# Patient Record
Sex: FEMALE | Race: WHITE | NOT HISPANIC OR LATINO | Employment: OTHER | URBAN - METROPOLITAN AREA
[De-identification: names, ages, dates, MRNs, and addresses within clinical notes are randomized per-mention and may not be internally consistent; named-entity substitution may affect disease eponyms.]

---

## 2017-01-16 ENCOUNTER — TELEPHONE (OUTPATIENT)
Dept: MEDICAL GROUP | Facility: MEDICAL CENTER | Age: 47
End: 2017-01-16

## 2017-01-16 DIAGNOSIS — E03.8 OTHER SPECIFIED HYPOTHYROIDISM: ICD-10-CM

## 2017-01-17 RX ORDER — CYCLOBENZAPRINE HCL 10 MG
TABLET ORAL
Qty: 60 TAB | Refills: 0 | Status: SHIPPED
Start: 2017-01-17 | End: 2017-02-21 | Stop reason: SDUPTHER

## 2017-01-17 RX ORDER — LEVOTHYROXINE SODIUM 175 UG/1
TABLET ORAL
Qty: 90 TAB | Refills: 1 | Status: SHIPPED | OUTPATIENT
Start: 2017-01-17 | End: 2017-09-13 | Stop reason: SDUPTHER

## 2017-01-17 RX ORDER — ALPRAZOLAM 1 MG/1
TABLET ORAL
Qty: 90 TAB | Refills: 0 | OUTPATIENT
Start: 2017-01-17

## 2017-01-17 NOTE — TELEPHONE ENCOUNTER
Was the patient seen in the last year in this department? Yes     Does patient have an active prescription for medications requested? No     Received Request Via: Patient     [place]  for [place]anax [place] 12/28/16

## 2017-01-23 ENCOUNTER — OFFICE VISIT (OUTPATIENT)
Dept: MEDICAL GROUP | Facility: MEDICAL CENTER | Age: 47
End: 2017-01-23
Payer: COMMERCIAL

## 2017-01-23 VITALS
WEIGHT: 280 LBS | BODY MASS INDEX: 45 KG/M2 | TEMPERATURE: 97.9 F | DIASTOLIC BLOOD PRESSURE: 84 MMHG | RESPIRATION RATE: 12 BRPM | SYSTOLIC BLOOD PRESSURE: 104 MMHG | OXYGEN SATURATION: 95 % | HEART RATE: 82 BPM | HEIGHT: 66 IN

## 2017-01-23 DIAGNOSIS — F41.9 ANXIETY: ICD-10-CM

## 2017-01-23 DIAGNOSIS — L98.9 BENIGN SKIN LESION: ICD-10-CM

## 2017-01-23 DIAGNOSIS — M77.11 RIGHT LATERAL EPICONDYLITIS: ICD-10-CM

## 2017-01-23 PROCEDURE — 20605 DRAIN/INJ JOINT/BURSA W/O US: CPT | Performed by: NURSE PRACTITIONER

## 2017-01-23 PROCEDURE — 99213 OFFICE O/P EST LOW 20 MIN: CPT | Mod: 25 | Performed by: NURSE PRACTITIONER

## 2017-01-23 RX ORDER — ALPRAZOLAM 1 MG/1
1 TABLET ORAL 3 TIMES DAILY PRN
Qty: 90 TAB | Refills: 0 | Status: SHIPPED
Start: 2017-01-23 | End: 2017-02-27 | Stop reason: SDUPTHER

## 2017-01-23 NOTE — MR AVS SNAPSHOT
"        Bella Fagan Phoenix   2017 3:40 PM   Office Visit   MRN: [id number]    Department:  [address]   Dept Phone:  [phone]    Description:  Female : 1970   Provider:  ERIKA Cuevas           Reason for Visit     Other patient has suspicious lesion under left breast area, brown in color, onset 6 months       Allergies as of 2017     Allergen Noted Reactions    Nkda [No Known Drug Allergy] 09/15/2010         Vital Signs     Blood Pressure Pulse Temperature Respirations Height Weight    104/84 mmHg 82 36.6 °C (97.9 °F) 12 1.676 m (5' 5.98\") 127.007 kg (280 lb)    Body Mass Index Oxygen Saturation Smoking Status             45.21 kg/m2 95% Former Smoker         Basic Information     Date Of Birth Sex Race Ethnicity Preferred Language    1970 Female White Non- English      Problem List              ICD-10-CM Priority Class Noted - Resolved    Obesity E66.9   2009 - Present    Chronic back pain M54.9, G89.29   2011 - Present    Anxiety F41.9   2011 - Present    Insomnia G47.00   3/20/2014 - Present    Other specified hypothyroidism E03.8   2015 - Present    Right knee pain M25.561   2016 - Present      Health Maintenance        Date Due Completion Dates    IMM DTaP/Tdap/Td Vaccine (1 - Tdap) 1988 ---    IMM INFLUENZA (1) 2015    PAP SMEAR 10/24/2016 10/24/2013, 9/15/2010    MAMMOGRAM 2017, 2015, 3/31/2014, 3/29/2013, 2010            Current Immunizations     Influenza Vaccine Quad Inj (Pf) 2015 11:04 AM      Below and/or attached are the medications your provider expects you to take. Review all of your home medications and newly ordered medications with your provider and/or pharmacist. Follow medication instructions as directed by your provider and/or pharmacist. Please keep your medication list with you and share with your provider. Update the information when medications are discontinued, " doses are changed, or new medications (including over-the-counter products) are added; and carry medication information at all times in the event of emergency situations     Allergies:  NKDA - (reactions not documented)               Medications  Valid as of: January 23, 2017 -  4:33 PM    Generic Name Brand Name Tablet Size Instructions for use    ALPRAZolam (Tab) XANAX 1 MG TAKE ONE-HALF TO ONE TABLET BY MOUTH THREE TIMES DAILY AS NEEDED FOR ANXIETY        Amitriptyline HCl (Tab) ELAVIL 150 MG Take 1 Tab by mouth at bedtime as needed for Sleep.        Amitriptyline HCl (Tab) ELAVIL 50 MG Take 1-2 Tabs by mouth at bedtime as needed for Sleep.        Amoxicillin-Pot Clavulanate (Tab) AUGMENTIN 875-125 MG Take 1 Tab by mouth 2 times a day.        Cyclobenzaprine HCl (Tab) FLEXERIL 10 MG TAKE ONE TABLET BY MOUTH TWICE DAILY AS NEEDED        Garcinia Cambogia-Chromium (Tab) Garcinia Cambogia-Chromium 500-200 MG-MCG Take  by mouth.        Hydrocod Polst-Chlorphen Polst (Suspension Extended Release) TUSSIONEX 10-8 MG/5ML Take 5 mL by mouth every 12 hours.        Hydrocodone-Acetaminophen (Tab) NORCO 5-325 MG Take 1 Tab by mouth every 6 hours as needed.        Ibuprofen (Tab) MOTRIN 800 MG Take 1 Tab by mouth every 8 hours as needed for Mild Pain.        Levothyroxine Sodium (Tab) SYNTHROID 175 MCG TAKE ONE TABLET BY MOUTH ONCE DAILY        Nabumetone (Tab) RELAFEN 500 MG Take 1 Tab by mouth 2 times a day. With food in place of ibuprofen for knee inflammation        Somatropin (Recon Soln) Somatropin 0.2 MG Inject 4 mg as instructed.        Somatropin   Inject 0.08 Each as instructed every day.        .                 Medicines prescribed today were sent to:     [facility] - [place] (), [place] - [address]    [address] [place] [address] [place] [address]    Phone: [phone] Fax: [phone]    Open 24 Hours?: No    [facility]  [place], [place] - [address] [address]  [place] [address]    Phone: [phone] Fax: [phone]    Open 24 Hours?: Yes      Medication refill instructions:       If your prescription bottle indicates you have medication refills left, it is not necessary to call your provider’s office. Please contact your pharmacy and they will refill your medication.    If your prescription bottle indicates you do not have any refills left, you may request refills at any time through one of the following ways: The online Quri system (except Urgent Care), by calling your provider’s office, or by asking your pharmacy to contact your provider’s office with a refill request. Medication refills are processed only during regular business hours and may not be available until the next business day. Your provider may request additional information or to have a follow-up visit with you prior to refilling your medication.   *Please Note: Medication refills are assigned a new Rx number when refilled electronically. Your pharmacy may indicate that no refills were authorized even though a new prescription for the same medication is available at the pharmacy. Please request the medicine by name with the pharmacy before contacting your provider for a refill.           Quri Access Code: Activation code not generated  Current Quri Status: Active

## 2017-01-24 NOTE — PROGRESS NOTES
"Chief Complaint   Patient presents with   • Other     patient has suspicious lesion under left breast area, brown in color, onset 6 months        HPI  Tammie is a 45 yo est female here with a few concerns:  #1-reports that she has a lesion under left breast has been present for approximately 6 months.  No pain, itching or discharge.  Not changing in size.  She is concerned b/c of grandmother's hx of CA -uncertain type.  Maternal aunt - had breast CA.  Mother passed young from overdose.    #2-anxiety: Chronic issue. Takes Xanax 3 times daily and has been dependent on Xanax for multiple years. Reports the Xanax works well for her and other medications that she has tried for anxiety of been unsuccessful. Requesting a refill of her Xanax.  #3-right elbow lateral epicondylitis:  Initial symptom 8/2015 and her right elbow was injected by Dr. Velez. I injected her elbow approximately 10 months ago and she states that injections usually help her for several months but the pain returns. 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 She reports that her right elbow aches constantly, more so with use. She has been wearing a tennis elbow brace since her pain started which she states helps intermittently. She denies numbness or tingling to her arm.    Right knee pain - improved.  Seeing Dr. Gonzales for right knee - injection helped tremendously and pain is gone.  MRI of knee was negative.        Past medical, surgical, family, and social history is reviewed and updated in Epic chart by me today.   Medications and allergies reviewed and updated in Epic chart by me today.     ROS:   As documented in history of present illness above    Exam:  Blood pressure 104/84, pulse 82, temperature 36.6 °C (97.9 °F), resp. rate 12, height 1.676 m (5' 5.98\"), weight 127.007 kg (280 lb), SpO2 95 %.  Constitutional: Alert, no distress, plus 3 vital signs  Skin:  Warm, dry, no rashes invisible areas. She " has a 4 mm x 2 mm superficial hypopigmented raised nevi just below her left breast without any sign of atypia such as dark color, irregular border or size greater than 8 mm.  Eye: Equal, round and reactive, conjunctiva clear  ENMT: Lips without lesions, good dentition, oropharynx clear    Neck: Trachea midline  Respiratory: Unlabored respiration, lungs clear to auscultation, no wheezes, no rhonchi  Cardiovascular: Normal rate and rhythm, no murmur, no edema  Musculoskeletal: She is tender to her right lateral epicondyle area with overlying swelling but no erythema or masses felt.  Psych: Alert, pleasant, well-groomed, normal affect    PROCEDURE NOTE: Patient consented after risks and benefits discussed of injection and corticosteroid. Area cleansed with iodine. Ethyl chloride sprayed in area. Right lateral elbow- region of lateral epicondyle/soft tissue area injected with 1 ml kenalog 40 mg/ml + 1 ml lidocaine 1%. Patient tolerated procedure well. No complications. After care instructions given    A/P:  1. Anxiety  -Patient is early for her Xanax, last fill was December 28, she may fill this Friday, January 27. Reviewed dependency properties of alprazolam with her and she verbalized understanding. Discussed that she may not drive a car within 4-6 hours of taking Xanax.  - alprazolam (XANAX) 1 MG Tab; Take 1 Tab by mouth 3 times a day as needed for Anxiety.  Dispense: 90 Tab; Refill: 0    2. Benign skin lesion  -Discussed benign nature.    3. Right lateral epicondylitis  -see procedure noted above. Offered referral to physical therapy which she declined. She'll continue wearing her right elbow brace. Discussed stretches, ice, heat, topical anti-inflammatories, appropriate amounts of oral anti-inflammatories and I encouraged her to make an appointment with her orthopedist regarding this if this injection is not helpful or if her pain returns sooner than 4-6 weeks.

## 2017-02-21 RX ORDER — CYCLOBENZAPRINE HCL 10 MG
TABLET ORAL
Qty: 60 TAB | Refills: 0 | Status: SHIPPED | OUTPATIENT
Start: 2017-02-21 | End: 2017-11-16 | Stop reason: SDUPTHER

## 2017-02-22 ENCOUNTER — TELEPHONE (OUTPATIENT)
Dept: MEDICAL GROUP | Facility: MEDICAL CENTER | Age: 47
End: 2017-02-22

## 2017-02-22 DIAGNOSIS — F41.9 ANXIETY: ICD-10-CM

## 2017-02-22 NOTE — TELEPHONE ENCOUNTER
Referral is in to therapist.  Is there an appt early next week in our new office with me? Reassure her that she can't make an incorrect choice and to follow her instinct.
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
## 2017-02-23 ENCOUNTER — PATIENT MESSAGE (OUTPATIENT)
Dept: FAMILY PLANNING/WOMEN'S HEALTH CLINIC | Facility: OTHER | Age: 47
End: 2017-02-23

## 2017-02-27 RX ORDER — NITROFURANTOIN 25; 75 MG/1; MG/1
CAPSULE ORAL
Qty: 30 CAP | Refills: 0 | Status: SHIPPED | OUTPATIENT
Start: 2017-02-27 | End: 2018-01-24

## 2017-02-27 RX ORDER — AMITRIPTYLINE HYDROCHLORIDE 50 MG/1
TABLET, FILM COATED ORAL
Qty: 90 TAB | Refills: 2 | Status: SHIPPED | OUTPATIENT
Start: 2017-02-27 | End: 2017-08-30 | Stop reason: SDUPTHER

## 2017-02-27 RX ORDER — ALPRAZOLAM 1 MG/1
.5-1 TABLET ORAL 3 TIMES DAILY PRN
Qty: 90 TAB | Refills: 0 | Status: SHIPPED
Start: 2017-02-27 | End: 2017-03-27 | Stop reason: SDUPTHER

## 2017-02-27 NOTE — TELEPHONE ENCOUNTER
Was the patient seen in the last year in this department? Yes  refill 1/27/17    Does patient have an active prescription for medications requested? No     Received Request Via: Pharmacy

## 2017-03-01 ENCOUNTER — TELEPHONE (OUTPATIENT)
Dept: MEDICAL GROUP | Facility: LAB | Age: 47
End: 2017-03-01

## 2017-03-01 DIAGNOSIS — M25.561 ACUTE PAIN OF RIGHT KNEE: ICD-10-CM

## 2017-03-01 NOTE — TELEPHONE ENCOUNTER
[facility]. Patient has hometown and has an appt tomorrow with them

## 2017-03-02 ENCOUNTER — OFFICE VISIT (OUTPATIENT)
Dept: MEDICAL GROUP | Facility: LAB | Age: 47
End: 2017-03-02
Payer: COMMERCIAL

## 2017-03-02 ENCOUNTER — TELEPHONE (OUTPATIENT)
Dept: MEDICAL GROUP | Facility: LAB | Age: 47
End: 2017-03-02

## 2017-03-02 VITALS
TEMPERATURE: 98.1 F | RESPIRATION RATE: 12 BRPM | HEART RATE: 88 BPM | WEIGHT: 282 LBS | HEIGHT: 66 IN | BODY MASS INDEX: 45.32 KG/M2 | OXYGEN SATURATION: 95 % | SYSTOLIC BLOOD PRESSURE: 98 MMHG | DIASTOLIC BLOOD PRESSURE: 80 MMHG

## 2017-03-02 DIAGNOSIS — F41.9 ANXIETY: ICD-10-CM

## 2017-03-02 DIAGNOSIS — G47.09 OTHER INSOMNIA: ICD-10-CM

## 2017-03-02 DIAGNOSIS — E66.9 OBESITY, UNSPECIFIED OBESITY SEVERITY, UNSPECIFIED OBESITY TYPE: ICD-10-CM

## 2017-03-02 PROCEDURE — 99214 OFFICE O/P EST MOD 30 MIN: CPT | Performed by: NURSE PRACTITIONER
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
## 2017-03-02 NOTE — TELEPHONE ENCOUNTER
1. Caller Name: Bella                                         Call Back Number: [phone]      Patient approves a detailed voicemail message: yes    Patient states Dr. Gonzales would not do another cortisone injection on her elbow because he states she has had too many.  So many that the shots have deteriorated her ligament.  She is wondering if she can have something for pain so she can function. She states she does not want any Norco or anything like that but would like to have an aspirin/codeine type of medication.  She says the pain wakes her up in the middle of the night and she has found it difficult to function.   Please advise.

## 2017-03-03 NOTE — TELEPHONE ENCOUNTER
Did he have another plan that he shared with her?    Yes, ok to call in tylenol #3, #45, 1-2 at night for pain.  One refill is fine.   Thank you!

## 2017-03-03 NOTE — TELEPHONE ENCOUNTER
Patient notified. She is requesting to have this medication called in for her into [facility] on [address]  This has been done for Bella.

## 2017-04-11 ENCOUNTER — NON-PROVIDER VISIT (OUTPATIENT)
Dept: HEALTH INFORMATION MANAGEMENT | Facility: MEDICAL CENTER | Age: 47
End: 2017-04-11
Payer: COMMERCIAL

## 2017-04-11 VITALS — WEIGHT: 290.1 LBS | HEIGHT: 66 IN | BODY MASS INDEX: 46.62 KG/M2

## 2017-04-11 DIAGNOSIS — E66.9 OBESITY, UNSPECIFIED: ICD-10-CM

## 2017-04-11 PROCEDURE — 97802 MEDICAL NUTRITION INDIV IN: CPT | Performed by: DIETITIAN, REGISTERED

## 2017-04-11 NOTE — MR AVS SNAPSHOT
Bella Fagan Phoenix   2017 10:30 AM   Appointment   MRN: [id number]    Department:  [facility]   Dept Phone:  [phone]    Description:  Female : 1970   Provider:  Kavya Hickman RD           Allergies as of 2017     Allergen Noted Reactions    Nkda [No Known Drug Allergy] 09/15/2010         Vital Signs     Smoking Status                   Former Smoker           Basic Information     Date Of Birth Sex Race Ethnicity Preferred Language    1970 Female White Non- English      Your appointments     May 24, 2017  2:00 PM   Follow Up Visit with Kavya Hickman RD   [employer] [facility])    [address]  [address]  [place] [address] [phone]           It is the patient's responsibility to check with your Insurance for benefit coverage for visit / visits.  24 hours notice is required for all appointment changes or cancellation.  Please arrive 20 min. before your appointment time  Please bring the following with you: 1)Picture Id 2) Insurance card 3) Completed Forms if New Patient  If scheduled for DIABETES VISIT please also brin) Medications 2) Meter 3) Blood glucose logs 4) Any recent labs if you have them  If scheduled for NUTRITION VISIT please also brin) 2-3 days of detailed food intake logs 2) Blood glucose monitor and blood glucose logs (if you have them)              Problem List              ICD-10-CM Priority Class Noted - Resolved    Obesity E66.9   2009 - Present    Chronic back pain M54.9, G89.29   2011 - Present    Anxiety F41.9   2011 - Present    Insomnia G47.00   3/20/2014 - Present    Other specified hypothyroidism E03.8   2015 - Present    Right knee pain M25.561   2016 - Present      Health Maintenance        Date Due Completion Dates    IMM DTaP/Tdap/Td Vaccine (1 - Tdap) 1988 ---    PAP SMEAR 10/24/2016 10/24/2013, 9/15/2010    MAMMOGRAM 2017, 2015, 3/31/2014, 3/29/2013,  9/30/2010            Current Immunizations     Influenza Vaccine Quad Inj (Pf) 12/16/2015 11:04 AM      Below and/or attached are the medications your provider expects you to take. Review all of your home medications and newly ordered medications with your provider and/or pharmacist. Follow medication instructions as directed by your provider and/or pharmacist. Please keep your medication list with you and share with your provider. Update the information when medications are discontinued, doses are changed, or new medications (including over-the-counter products) are added; and carry medication information at all times in the event of emergency situations     Allergies:  NKDA - (reactions not documented)               Medications  Valid as of: April 11, 2017 - 12:10 PM    Generic Name Brand Name Tablet Size Instructions for use    ALPRAZolam (Tab) XANAX 1 MG TAKE ONE-HALF TO ONE TABLET BY MOUTH THREE TIMES DAILY AS NEEDED FOR ANXIETY        Amitriptyline HCl (Tab) ELAVIL 150 MG Take 1 Tab by mouth at bedtime as needed for Sleep.        Amitriptyline HCl (Tab) ELAVIL 150 MG TAKE ONE TABLET BY MOUTH AT BEDTIME AS NEEDED FOR SLEEP        Amitriptyline HCl (Tab) ELAVIL 50 MG TAKE ONE TO TWO TABLETS BY MOUTH AT BEDTIME AS NEEDED FOR SLEEP        Amoxicillin-Pot Clavulanate (Tab) AUGMENTIN 875-125 MG Take 1 Tab by mouth 2 times a day.        Cyclobenzaprine HCl (Tab) FLEXERIL 10 MG TAKE ONE TABLET BY MOUTH TWICE DAILY AS NEEDED        Garcinia Cambogia-Chromium (Tab) Garcinia Cambogia-Chromium 500-200 MG-MCG Take  by mouth.        Hydrocod Polst-Chlorphen Polst (Suspension Extended Release) TUSSIONEX 10-8 MG/5ML Take 5 mL by mouth every 12 hours.        Hydrocodone-Acetaminophen (Tab) NORCO 5-325 MG Take 1 Tab by mouth every 6 hours as needed.        Ibuprofen (Tab) MOTRIN 800 MG Take 1 Tab by mouth every 8 hours as needed for Mild Pain.        Levothyroxine Sodium (Tab) SYNTHROID 175 MCG TAKE ONE TABLET BY MOUTH ONCE  DAILY        Nabumetone (Tab) RELAFEN 500 MG Take 1 Tab by mouth 2 times a day. With food in place of ibuprofen for knee inflammation        Nitrofurantoin Monohyd Macro (Cap) MACROBID 100 MG TAKE ONE CAPSULE BY MOUTH TWICE DAILY FOR 7 DAYS, AND THEN TAKE ONE CAP IMMEDIATELY AFTER INTERCOURSE        Somatropin (Recon Soln) Somatropin 0.2 MG Inject 4 mg as instructed.        Somatropin   Inject 0.08 Each as instructed every day.        .                 Medicines prescribed today were sent to:     [facility] (), [place] - [address]    [address] () [place] [address]    Phone: [phone] Fax: [phone]    Open 24 Hours?: No    [facility] [address] [place] [address] - [address] [facility] [place]    [address] [facility] [address]    Phone: [phone] Fax: [phone]    Open 24 Hours?: Yes      Medication refill instructions:       If your prescription bottle indicates you have medication refills left, it is not necessary to call your provider’s office. Please contact your pharmacy and they will refill your medication.    If your prescription bottle indicates you do not have any refills left, you may request refills at any time through one of the following ways: The online NATIONSPLAY system (except Urgent Care), by calling your provider’s office, or by asking your pharmacy to contact your provider’s office with a refill request. Medication refills are processed only during regular business hours and may not be available until the next business day. Your provider may request additional information or to have a follow-up visit with you prior to refilling your medication.   *Please Note: Medication refills are assigned a new Rx number when refilled electronically. Your pharmacy may indicate that no refills were authorized even though a new prescription for the same medication is available at the pharmacy. Please request the medicine by name with the pharmacy  before contacting your provider for a refill.           MyChart Access Code: Activation code not generated  Current MyChart Status: Active

## 2017-04-11 NOTE — PROGRESS NOTES
"4/11/2017 46 y.o.   Referring Provider: ERIKA Cuevas       Time in/out:  10:30-11:48am     Anthropometrics/Objective  Filed Vitals:    04/11/17 1630   Height: 1.676 m (5' 6\")   Weight: 131.588 kg (290 lb 1.6 oz)       Body mass index is 46.85 kg/(m^2).    Stated Goal Weight: NA   Estimated Daily Caloric needs 4315-0382  Kcal based on [address] [place]    See comprehensive patient history form for further information     Subjective:  Pt states she would like to lose weight but is frustrated that she has been unable to despite eating , what she thinks is, a healthy diet.   Pt eats small frequent meals; every 3-4 hours, sometimes more often.   She has her own garden and chickens.   Drinks water. And coffee with creamer and sugar.   Eats 1/2 a watermelon 1-3 cups at a time throughout the day.  (Note: 1/2 of a 13lb  Watermelon was nearly 500calories and 115g of carbohydrates)   Diet hx:   B- 3 eggs fresh from her chickens   S- chicken or pasta left overs. Small portion  L- lunch meat and cheese with fruit   S- watermelon , nuts, energy bar   S- more watermelon   D- varies. Mostly chx, also beef and pork  S- granola or cereal and/or fruit   Pt cooks dinners for her in law who are elderly and do not eat much. So she finds it difficult to cook for them and something different for herself.   Pt reports weight gain of 100lb after she fell and hit her head. States she injured her pitutitary gland. And has hypothyroidism. She is surprised she has not lost weight since taking medication for her endocrine system.   She is on a sleeping pill and states it causes rapid increase in appetite, especially at night. Snacks a lot after dinner. Feels she has not control of that.     Nutrition Diagnosis (PES Statement)  Food/nutrition knowledge deficit related to no previous nutrition education as evidenced by limited nutrition knowledge per discussion with patient  Obesity related to excessive energy intake and inadequate " energy expenditure as evidenced by BMI >30      Client history:  Condition(s) associated with a diagnosis or treatment (specify) obesity, hypothyroidism     Biochemical data, medical test and procedures  Lab Results   Component Value Date/Time    GLYCOHEMOGLOBIN 5.4 03/27/2013 12:55 PM   @  No results found for: POCGLUCOSE  Lab Results   Component Value Date/Time    CHOLESTEROL, 09/23/2016 11:44 AM    * 09/23/2016 11:44 AM    HDL 44 09/23/2016 11:44 AM    TRIGLYCERIDES 138 09/23/2016 11:44 AM         Nutrition Intervention  Nutrition Prescription  Recommended Daily Kcals 1800-2000kcal     Meal and Snack  Recommend a general/healthful diet     Comprehensive Nutrition education Instruction or training leading to in-depth nutrition related knowledge about:  Combine carb, protein and fat at each meal, Meal timing and spacing, Metabolism of carb, protein, fat, Physical activity/exercise, Portion control, Increasing/Decreasing PO intake and Handouts provided regarding topics discussed    Monitoring & Evaluation Plan    Behavioral-Environmental:  Physical activity : Pt does water aerobics 2/ week. She is more active during craft season. Recommended she increase her daily activty as well as days of physical activity/exercise. Recommendation is 5 days a week [id number] of cardiovascular exercise.     Food / Nutrient Intake:  Fluid/Beverage intake : Cut back on creamer and do not add sugar to coffee  Food intake:  Avoid grazing several times a day. Instead have a structured snack up to 3 times a day (1 serving carb + 1oz protein). Limit fruit to 1 cup/ 1 serving at one time (3-4 a day is fine). Her glucose is trending up, and needs to be more mindful of carbohydrate intake from all source, including fruit. Use plate method to control portions and balance macronutrients at meals. Be more mindful of portions of added fats. Reduce portion of butter used at dinner. (Pt states she uses 1/2 stick every night at dinner).  "Talk to MD about another sleeping medication that wont affect her appetite.     Physical Signs / Symptoms:  Weight change -1-2lb per week to achieve goal weight or BMI within normal weight range.     Assessment Notes:  Pt is very enthusiastic when discussing her weight hx and diet hx. She has read many different nutrition claims online and is misinformed about a lot of them. She is in the precontemplative stage of change. Part of the time she realizes she needs to lose weight and improve certain parts of her diet. While at other she states she doesn't understand why she isnt losing weight. Based on her diet hx she is eating excess calories from added fats at meals like butter; and is grazing all day on foods. Explained that even too much of a healthy low calorie food can add up and become excessive. The watermelon, for example is providing nearly 500kcal per day for her.   Also explained that weight status is affected by many factors, not only calorie intake. Metabolism, age, activity level, genetics, hormones etc can impact our weight status. However did encourage her that she should be able to lose weight by changing things with her diet. She agrees and will work on some improvements to her dietary patterns. She needs to learn that there is not \"Good and bad\" foods or any one food that will make her lose weight. But instead it is about her overall eating pattern.   Will follow up in about a month and see if the changes are helping her to lose weight.     Follow up 4-6 weeks   Kavya Castañeda RD, LD, CDE  [phone]      "

## 2017-04-14 ENCOUNTER — HOSPITAL ENCOUNTER (OUTPATIENT)
Dept: LAB | Facility: MEDICAL CENTER | Age: 47
End: 2017-04-14
Attending: INTERNAL MEDICINE
Payer: COMMERCIAL

## 2017-04-14 LAB
ALBUMIN SERPL BCP-MCNC: 4.1 G/DL (ref 3.2–4.9)
ALBUMIN/GLOB SERPL: 1.3 G/DL
ALP SERPL-CCNC: 54 U/L (ref 30–99)
ALT SERPL-CCNC: 19 U/L (ref 2–50)
ANION GAP SERPL CALC-SCNC: 7 MMOL/L (ref 0–11.9)
AST SERPL-CCNC: 14 U/L (ref 12–45)
BILIRUB SERPL-MCNC: 0.5 MG/DL (ref 0.1–1.5)
BUN SERPL-MCNC: 23 MG/DL (ref 8–22)
CALCIUM SERPL-MCNC: 9.3 MG/DL (ref 8.5–10.5)
CHLORIDE SERPL-SCNC: 103 MMOL/L (ref 96–112)
CO2 SERPL-SCNC: 24 MMOL/L (ref 20–33)
CREAT SERPL-MCNC: 0.74 MG/DL (ref 0.5–1.4)
CREAT UR-MCNC: 50.6 MG/DL
GFR SERPL CREATININE-BSD FRML MDRD: >60 ML/MIN/1.73 M 2
GLOBULIN SER CALC-MCNC: 3.1 G/DL (ref 1.9–3.5)
GLUCOSE SERPL-MCNC: 110 MG/DL (ref 65–99)
PHOSPHATE SERPL-MCNC: 3 MG/DL (ref 2.5–4.5)
POTASSIUM SERPL-SCNC: 4.3 MMOL/L (ref 3.6–5.5)
PROT SERPL-MCNC: 7.2 G/DL (ref 6–8.2)
SODIUM SERPL-SCNC: 134 MMOL/L (ref 135–145)

## 2017-04-14 PROCEDURE — 82340 ASSAY OF CALCIUM IN URINE: CPT

## 2017-04-14 PROCEDURE — 80053 COMPREHEN METABOLIC PANEL: CPT

## 2017-04-14 PROCEDURE — 84100 ASSAY OF PHOSPHORUS: CPT

## 2017-04-14 PROCEDURE — 82570 ASSAY OF URINE CREATININE: CPT

## 2017-04-14 PROCEDURE — 36415 COLL VENOUS BLD VENIPUNCTURE: CPT

## 2017-04-14 PROCEDURE — 84105 ASSAY OF URINE PHOSPHORUS: CPT

## 2017-04-14 PROCEDURE — 84305 ASSAY OF SOMATOMEDIN: CPT

## 2017-04-16 LAB
IGF-I SERPL-MCNC: 266 NG/ML (ref 118–298)
PHOSPHATE 24H UR-MCNC: 41 MG/DL
PHOSPHATE 24H UR-MRATE: NORMAL MG/D (ref 400–1300)
PHOSPHATE/CREAT 24H UR: 872 MG/G
TOTAL VOLUME 1105: NORMAL ML

## 2017-04-17 LAB
CALCIUM 24H UR-MCNC: 6.6 MG/DL
CALCIUM 24H UR-MRATE: NORMAL MG/D
CALCIUM/CREAT 24H UR: 140 MG/G (ref 20–300)
CREAT 24H UR-MCNC: 47 MG/DL
CREAT 24H UR-MRATE: NORMAL MG/D (ref 700–1600)
SPECIMEN VOL ?TM UR: NORMAL ML

## 2017-04-27 ENCOUNTER — HOSPITAL ENCOUNTER (OUTPATIENT)
Dept: LAB | Facility: MEDICAL CENTER | Age: 47
End: 2017-04-27
Attending: INTERNAL MEDICINE
Payer: COMMERCIAL

## 2017-04-27 LAB
ALBUMIN SERPL BCP-MCNC: 4.3 G/DL (ref 3.2–4.9)
ALBUMIN/GLOB SERPL: 1.4 G/DL
ALP SERPL-CCNC: 51 U/L (ref 30–99)
ALT SERPL-CCNC: 18 U/L (ref 2–50)
ANION GAP SERPL CALC-SCNC: 5 MMOL/L (ref 0–11.9)
AST SERPL-CCNC: 12 U/L (ref 12–45)
BILIRUB SERPL-MCNC: 0.4 MG/DL (ref 0.1–1.5)
BUN SERPL-MCNC: 12 MG/DL (ref 8–22)
CALCIUM SERPL-MCNC: 10 MG/DL (ref 8.5–10.5)
CHLORIDE SERPL-SCNC: 102 MMOL/L (ref 96–112)
CO2 SERPL-SCNC: 31 MMOL/L (ref 20–33)
CREAT SERPL-MCNC: 0.89 MG/DL (ref 0.5–1.4)
GFR SERPL CREATININE-BSD FRML MDRD: >60 ML/MIN/1.73 M 2
GLOBULIN SER CALC-MCNC: 3 G/DL (ref 1.9–3.5)
GLUCOSE SERPL-MCNC: 179 MG/DL (ref 65–99)
POTASSIUM SERPL-SCNC: 4.4 MMOL/L (ref 3.6–5.5)
PROT SERPL-MCNC: 7.3 G/DL (ref 6–8.2)
SODIUM SERPL-SCNC: 138 MMOL/L (ref 135–145)

## 2017-04-27 PROCEDURE — 36415 COLL VENOUS BLD VENIPUNCTURE: CPT

## 2017-04-27 PROCEDURE — 80053 COMPREHEN METABOLIC PANEL: CPT

## 2017-05-01 DIAGNOSIS — F41.9 ANXIETY: ICD-10-CM

## 2017-05-01 DIAGNOSIS — R10.31 RLQ ABDOMINAL PAIN: ICD-10-CM

## 2017-05-01 RX ORDER — ALPRAZOLAM 1 MG/1
1 TABLET ORAL 3 TIMES DAILY PRN
Qty: 90 TAB | Refills: 0 | Status: CANCELLED
Start: 2017-05-01

## 2017-05-01 RX ORDER — ALPRAZOLAM 1 MG/1
TABLET ORAL
Qty: 90 TAB | Refills: 0 | Status: SHIPPED | OUTPATIENT
Start: 2017-05-01 | End: 2017-06-01 | Stop reason: SDUPTHER

## 2017-05-01 NOTE — TELEPHONE ENCOUNTER
Was the patient seen in the last year in this department? Yes     Does patient have an active prescription for medications requested? No     Received Request Via: Pharmacy     Per  last fill: 3-28-17 # 90

## 2017-05-05 ENCOUNTER — APPOINTMENT (OUTPATIENT)
Dept: RADIOLOGY | Facility: MEDICAL CENTER | Age: 47
End: 2017-05-05
Attending: NURSE PRACTITIONER
Payer: COMMERCIAL

## 2017-05-08 ENCOUNTER — HOSPITAL ENCOUNTER (OUTPATIENT)
Dept: RADIOLOGY | Facility: MEDICAL CENTER | Age: 47
End: 2017-05-08
Attending: NURSE PRACTITIONER
Payer: COMMERCIAL

## 2017-05-08 ENCOUNTER — HOSPITAL ENCOUNTER (OUTPATIENT)
Dept: LAB | Facility: MEDICAL CENTER | Age: 47
End: 2017-05-08
Attending: INTERNAL MEDICINE
Payer: COMMERCIAL

## 2017-05-08 DIAGNOSIS — R10.31 RLQ ABDOMINAL PAIN: ICD-10-CM

## 2017-05-08 LAB
ALBUMIN SERPL BCP-MCNC: 4.2 G/DL (ref 3.2–4.9)
ALBUMIN/GLOB SERPL: 1.3 G/DL
ALP SERPL-CCNC: 54 U/L (ref 30–99)
ALT SERPL-CCNC: 13 U/L (ref 2–50)
ANION GAP SERPL CALC-SCNC: 8 MMOL/L (ref 0–11.9)
AST SERPL-CCNC: 10 U/L (ref 12–45)
BILIRUB SERPL-MCNC: 0.6 MG/DL (ref 0.1–1.5)
BUN SERPL-MCNC: 17 MG/DL (ref 8–22)
CALCIUM SERPL-MCNC: 10.3 MG/DL (ref 8.5–10.5)
CHLORIDE SERPL-SCNC: 105 MMOL/L (ref 96–112)
CO2 SERPL-SCNC: 26 MMOL/L (ref 20–33)
CREAT SERPL-MCNC: 0.84 MG/DL (ref 0.5–1.4)
CREAT UR-MCNC: 65.8 MG/DL
GFR SERPL CREATININE-BSD FRML MDRD: >60 ML/MIN/1.73 M 2
GLOBULIN SER CALC-MCNC: 3.2 G/DL (ref 1.9–3.5)
GLUCOSE SERPL-MCNC: 149 MG/DL (ref 65–99)
PHOSPHATE SERPL-MCNC: 2.9 MG/DL (ref 2.5–4.5)
POTASSIUM SERPL-SCNC: 3.7 MMOL/L (ref 3.6–5.5)
PROT SERPL-MCNC: 7.4 G/DL (ref 6–8.2)
SODIUM SERPL-SCNC: 139 MMOL/L (ref 135–145)

## 2017-05-08 PROCEDURE — 76857 US EXAM PELVIC LIMITED: CPT

## 2017-05-08 PROCEDURE — 80053 COMPREHEN METABOLIC PANEL: CPT

## 2017-05-08 PROCEDURE — 36415 COLL VENOUS BLD VENIPUNCTURE: CPT

## 2017-05-08 PROCEDURE — 82340 ASSAY OF CALCIUM IN URINE: CPT

## 2017-05-08 PROCEDURE — 84305 ASSAY OF SOMATOMEDIN: CPT

## 2017-05-08 PROCEDURE — 84100 ASSAY OF PHOSPHORUS: CPT

## 2017-05-08 PROCEDURE — 84105 ASSAY OF URINE PHOSPHORUS: CPT

## 2017-05-08 PROCEDURE — 82570 ASSAY OF URINE CREATININE: CPT

## 2017-05-10 DIAGNOSIS — K43.9 VENTRAL HERNIA WITHOUT OBSTRUCTION OR GANGRENE: ICD-10-CM

## 2017-05-10 LAB — IGF-I SERPL-MCNC: 283 NG/ML (ref 118–298)

## 2017-05-11 LAB
PHOSPHATE 24H UR-MCNC: 50 MG/DL
PHOSPHATE 24H UR-MRATE: NORMAL MG/D (ref 400–1300)
PHOSPHATE/CREAT 24H UR: 893 MG/G
TOTAL VOLUME 1105: NORMAL ML

## 2017-05-12 DIAGNOSIS — Z12.31 ENCOUNTER FOR SCREENING MAMMOGRAM FOR BREAST CANCER: ICD-10-CM

## 2017-05-12 LAB
CALCIUM 24H UR-MCNC: 20 MG/DL
CALCIUM 24H UR-MRATE: ABNORMAL MG/D
CALCIUM/CREAT 24H UR: 357 MG/G (ref 20–300)
CREAT 24H UR-MCNC: 56 MG/DL
CREAT 24H UR-MRATE: ABNORMAL MG/D (ref 700–1600)
SPECIMEN VOL ?TM UR: ABNORMAL ML

## 2017-05-17 ENCOUNTER — HOSPITAL ENCOUNTER (OUTPATIENT)
Facility: MEDICAL CENTER | Age: 47
End: 2017-05-17
Attending: NURSE PRACTITIONER
Payer: COMMERCIAL

## 2017-05-17 ENCOUNTER — OFFICE VISIT (OUTPATIENT)
Dept: MEDICAL GROUP | Facility: LAB | Age: 47
End: 2017-05-17
Payer: COMMERCIAL

## 2017-05-17 VITALS
RESPIRATION RATE: 16 BRPM | TEMPERATURE: 98 F | SYSTOLIC BLOOD PRESSURE: 124 MMHG | HEART RATE: 92 BPM | BODY MASS INDEX: 46.45 KG/M2 | DIASTOLIC BLOOD PRESSURE: 82 MMHG | OXYGEN SATURATION: 95 % | HEIGHT: 66 IN | WEIGHT: 289 LBS

## 2017-05-17 DIAGNOSIS — Z01.419 ENCOUNTER FOR GYNECOLOGICAL EXAMINATION: ICD-10-CM

## 2017-05-17 DIAGNOSIS — Z12.4 SCREENING FOR MALIGNANT NEOPLASM OF CERVIX: ICD-10-CM

## 2017-05-17 DIAGNOSIS — E66.01 MORBID OBESITY, UNSPECIFIED OBESITY TYPE (HCC): ICD-10-CM

## 2017-05-17 DIAGNOSIS — Z12.31 ENCOUNTER FOR SCREENING MAMMOGRAM FOR BREAST CANCER: ICD-10-CM

## 2017-05-17 DIAGNOSIS — F41.9 ANXIETY: ICD-10-CM

## 2017-05-17 DIAGNOSIS — F51.01 PRIMARY INSOMNIA: ICD-10-CM

## 2017-05-17 LAB — CYTOLOGY REG CYTOL: NORMAL

## 2017-05-17 PROCEDURE — 99396 PREV VISIT EST AGE 40-64: CPT | Performed by: NURSE PRACTITIONER

## 2017-05-17 PROCEDURE — 88175 CYTOPATH C/V AUTO FLUID REDO: CPT

## 2017-05-17 RX ORDER — BUPROPION HYDROCHLORIDE 150 MG/1
150 TABLET ORAL EVERY MORNING
Qty: 30 TAB | Refills: 5 | Status: SHIPPED | OUTPATIENT
Start: 2017-05-17 | End: 2017-11-14 | Stop reason: SDUPTHER

## 2017-05-17 RX ORDER — ZALEPLON 10 MG/1
10 CAPSULE ORAL DAILY
Qty: 30 CAP | Refills: 0 | Status: SHIPPED
Start: 2017-05-17 | End: 2017-08-24

## 2017-05-17 NOTE — MR AVS SNAPSHOT
"        Bella Robertswoody   2017 1:40 PM   Office Visit   MRN: [id number]    Department:  [facility]   Dept Phone:  [phone]    Description:  Female : 1970   Provider:  ERIKA Cuevas           Reason for Visit     Gynecologic Exam           Allergies as of 2017     Allergen Noted Reactions    Nkda [No Known Drug Allergy] 09/15/2010         You were diagnosed with     Primary insomnia   [551391]       Anxiety   [004509]       Morbid obesity, unspecified obesity type (CMS-HCC)   [5218657]       Encounter for gynecological examination   [7488907]       Screening for malignant neoplasm of cervix   [662483]       Encounter for screening mammogram for breast cancer   [5074576]         Vital Signs     Blood Pressure Pulse Temperature Respirations Height Weight    124/82 mmHg 92 36.7 °C (98 °F) 16 1.676 m (5' 5.98\") 131.09 kg (289 lb)    Body Mass Index Oxygen Saturation Smoking Status             46.67 kg/m2 95% Former Smoker         Basic Information     Date Of Birth Sex Race Ethnicity Preferred Language    1970 Female White Non- English      Your appointments     May 24, 2017  2:00 PM   Follow Up Visit with Kavya Hickman RD   [facility])    [address] [id number] [phone]           It is the patient's responsibility to check with your Insurance for benefit coverage for visit / visits.  24 hours notice is required for all appointment changes or cancellation.  Please arrive 20 min. before your appointment time  Please bring the following with you: 1)Picture Id 2) Insurance card 3) Completed Forms if New Patient  If scheduled for DIABETES VISIT please also brin) Medications 2) Meter 3) Blood glucose logs 4) Any recent labs if you have them  If scheduled for NUTRITION VISIT please also brin) 2-3 days of detailed food intake logs 2) Blood glucose monitor and blood glucose logs (if you have them)   "           Problem List              ICD-10-CM Priority Class Noted - Resolved    Obesity E66.9   7/24/2009 - Present    Chronic back pain M54.9, G89.29   5/2/2011 - Present    Anxiety F41.9   9/6/2011 - Present    Insomnia G47.00   3/20/2014 - Present    Other specified hypothyroidism E03.8   11/6/2015 - Present    Right knee pain M25.561   6/16/2016 - Present    Morbid obesity (CMS-HCC) E66.01   5/17/2017 - Present      Health Maintenance        Date Due Completion Dates    IMM DTaP/Tdap/Td Vaccine (1 - Tdap) 8/6/1988 ---    PAP SMEAR 10/24/2016 10/24/2013, 9/15/2010    MAMMOGRAM 4/7/2017 4/7/2016, 4/1/2015, 3/31/2014, 3/29/2013, 9/30/2010            Current Immunizations     Influenza Vaccine Quad Inj (Pf) 12/16/2015 11:04 AM      Below and/or attached are the medications your provider expects you to take. Review all of your home medications and newly ordered medications with your provider and/or pharmacist. Follow medication instructions as directed by your provider and/or pharmacist. Please keep your medication list with you and share with your provider. Update the information when medications are discontinued, doses are changed, or new medications (including over-the-counter products) are added; and carry medication information at all times in the event of emergency situations     Allergies:  NKDA - (reactions not documented)               Medications  Valid as of: May 17, 2017 -  2:21 PM    Generic Name Brand Name Tablet Size Instructions for use    ALPRAZolam (Tab) XANAX 1 MG TAKE ONE-HALF TO ONE TABLET BY MOUTH THREE TIMES DAILY AS NEEDED FOR ANXIETY        Amitriptyline HCl (Tab) ELAVIL 150 MG Take 1 Tab by mouth at bedtime as needed for Sleep.        Amitriptyline HCl (Tab) ELAVIL 150 MG TAKE ONE TABLET BY MOUTH AT BEDTIME AS NEEDED FOR SLEEP        Amitriptyline HCl (Tab) ELAVIL 50 MG TAKE ONE TO TWO TABLETS BY MOUTH AT BEDTIME AS NEEDED FOR SLEEP        BuPROPion HCl (TABLET SR 24 HR) WELLBUTRIN   MG Take 1 Tab by mouth every morning.        Cyclobenzaprine HCl (Tab) FLEXERIL 10 MG TAKE ONE TABLET BY MOUTH TWICE DAILY AS NEEDED        Garcinia Cambogia-Chromium (Tab) Garcinia Cambogia-Chromium 500-200 MG-MCG Take  by mouth.        Hydrocod Polst-Chlorphen Polst (Suspension Extended Release) TUSSIONEX 10-8 MG/5ML Take 5 mL by mouth every 12 hours.        Hydrocodone-Acetaminophen (Tab) NORCO 5-325 MG Take 1 Tab by mouth every 6 hours as needed.        Ibuprofen (Tab) MOTRIN 800 MG Take 1 Tab by mouth every 8 hours as needed for Mild Pain.        Levothyroxine Sodium (Tab) SYNTHROID 175 MCG TAKE ONE TABLET BY MOUTH ONCE DAILY        Nabumetone (Tab) RELAFEN 500 MG Take 1 Tab by mouth 2 times a day. With food in place of ibuprofen for knee inflammation        Nitrofurantoin Monohyd Macro (Cap) MACROBID 100 MG TAKE ONE CAPSULE BY MOUTH TWICE DAILY FOR 7 DAYS, AND THEN TAKE ONE CAP IMMEDIATELY AFTER INTERCOURSE        Somatropin (Recon Soln) Somatropin 0.2 MG Inject 4 mg as instructed.        Somatropin   Inject 0.08 Each as instructed every day.        Zaleplon (Cap) SONATA 10 MG Take 1 Cap by mouth every day.        .                 Medicines prescribed today were sent to:     [facility] [address] (), [place] - [address]    [address] [place] [address] () [place] [address]    Phone: [phone] Fax: [phone]    Open 24 Hours?: No    [facility] [address] [place] [address] - [address] [address] [address] [facility] [address]    Phone: [phone] Fax: [phone]    Open 24 Hours?: Yes      Medication refill instructions:       If your prescription bottle indicates you have medication refills left, it is not necessary to call your provider’s office. Please contact your pharmacy and they will refill your medication.    If your prescription bottle indicates you do not have any refills left, you may request refills at any time through one of the following ways: The  online [facility] system (except Urgent Care), by calling your provider’s office, or by asking your pharmacy to contact your provider’s office with a refill request. Medication refills are processed only during regular business hours and may not be available until the next business day. Your provider may request additional information or to have a follow-up visit with you prior to refilling your medication.   *Please Note: Medication refills are assigned a new Rx number when refilled electronically. Your pharmacy may indicate that no refills were authorized even though a new prescription for the same medication is available at the pharmacy. Please request the medicine by name with the pharmacy before contacting your provider for a refill.        Your To Do List     Future Labs/Procedures Complete By Expires    MA-SCREEN MAMMO W/CAD-BILAT  As directed 6/18/2018    THINPREP PAP,REFLEX HPV ON ASC-US ONLY  As directed 5/18/2018         [facility] Access Code: Activation code not generated  Current [facility] Status: Active

## 2017-05-17 NOTE — PROGRESS NOTES
Chief Complaint   Patient presents with   • Gynecologic Exam       HPI:  Bella is a 46 y.o.  female who presents for annual exam. Her previous concern is her weight. She would like to stop amitriptyline and she is concerned it is making her gain weight and hungry all the time. She has taken amitriptyline for years for sleep. She would like to try something else for sleep. She also like to try medication for daily anxiety to try and cut back on alprazolam and is interested in something that would help her lose weight. She has quit smoking but states that this is causing her to eat excessively.  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  Contraception: As above    Last Mammo: Mrs. do  Last colonoscopy:not applicable. 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  Last Td:current   Influenza vaccination:current   Pneumococcal vaccination:not applicable   Zostavax:not applicable   Hx STD''s: no   Regular exercise: yes      meds:   Current Outpatient Prescriptions   Medication Sig Dispense Refill   • zaleplon (SONATA) 10 MG capsule Take 1 Cap by mouth every day. 30 Cap 0   • alprazolam (XANAX) 1 MG Tab TAKE ONE-HALF TO ONE TABLET BY MOUTH THREE TIMES DAILY AS NEEDED FOR ANXIETY 90 Tab 0   • nitrofurantoin monohydr macro (MACROBID) 100 MG Cap TAKE ONE CAPSULE BY MOUTH TWICE DAILY FOR 7 DAYS, AND THEN TAKE ONE CAP IMMEDIATELY AFTER INTERCOURSE 30 Cap 0   • amitriptyline (ELAVIL) 50 MG Tab TAKE ONE TO TWO TABLETS BY MOUTH AT BEDTIME AS NEEDED FOR SLEEP 90 Tab 2   • cyclobenzaprine (FLEXERIL) 10 MG Tab TAKE ONE TABLET BY MOUTH TWICE DAILY AS NEEDED 60 Tab 0   • amitriptyline (ELAVIL) 150 MG Tab TAKE ONE TABLET BY MOUTH AT BEDTIME AS NEEDED FOR SLEEP 90 Tab 0    • levothyroxine (SYNTHROID) 175 MCG Tab TAKE ONE TABLET BY MOUTH ONCE DAILY 90 Tab 1   • hydrocodone-acetaminophen (NORCO) 5-325 MG Tab per tablet Take 1 Tab by mouth every 6 hours as needed. 10 Tab 0   • nabumetone (RELAFEN) 500 MG Tab Take 1 Tab by mouth 2 times a day. With food in place of ibuprofen for knee inflammation 60 Tab 0   • ibuprofen (MOTRIN) 800 MG Tab Take 1 Tab by mouth every 8 hours as needed for Mild Pain. 60 Tab 2   • amitriptyline (ELAVIL) 150 MG Tab Take 1 Tab by mouth at bedtime as needed for Sleep. 90 Tab 1   • Somatropin (OMNITROPE SC) Inject 0.08 Each as instructed every day.     • Somatropin (GENOTROPIN) 0.2 MG SOLR Inject 4 mg as instructed. 116 Each    • Garcinia Cambogia-Chromium 500-200 MG-MCG TABS Take  by mouth.       No current facility-administered medications for this visit.       Allergies: No Known Allergies    family:   Family History   Problem Relation Age of Onset   • Alcohol/Drug Mother      accidental overdose   • Cancer Mother      breast   • Cancer Maternal Uncle      liver   • Cancer Maternal Aunt      breast   • Cancer Maternal Grandmother      breast       social hx:   Social History     Social History   • Marital Status:      Spouse Name: N/A   • Number of Children: N/A   • Years of Education: N/A     Occupational History   • Not on file.     Social History Main Topics   • Smoking status: Former Smoker -- 1.00 packs/day     Quit date: 02/20/2013   • Smokeless tobacco: Never Used   • Alcohol Use: Yes      Comment: occ   • Drug Use: No   • Sexual Activity: Not on file      Comment: , twins,      Other Topics Concern   • Not on file     Social History Narrative       ROS:  No fever, chills, sweats.   No polydipsia, polyuria, temperature intolerance, significant weight changes   No visual changes, blurred vision.  No chest pain, palpitations, peripheral swelling   No chronic cough, shortness of breath, dyspnea with exertion.   No dysphagia,  "odynophagia, black or bloody stools.   No abdominal pain, nausea, persistent diarrhea, constipation   No dysuria, hematuria, incontinence. Denies nocturia  No rash, pruritis, pigment changes.   No focal weakness, syncope, headache, confusion, persistent numbness.   All other systems are reviewed and negative.    PHYSICAL EXAMINATION:  Blood pressure 124/82, pulse 92, temperature 36.7 °C (98 °F), resp. rate 16, height 1.676 m (5' 5.98\"), weight 131.09 kg (289 lb), SpO2 95 %.  General appearance:healthy, well developed, well nourished  Psych: alert, no distress, cooperative  Eyes: EOM's normal, pupils equal, round, reactive to light  ENT: Ears: external ears normal to inspection and palpation, TM's clear, Nose/Sinuses: nose shows no deformity, asymmetry, or inflammation  Neck: no asymmetry, masses, or scars, no adenopathy, thyroid normal to palpation  Lungs:chest symmetric with normal A/P diameter, no chest deformities noted, normal respiratory rate and rhythm  Cardiovascular:regular rate and rhythm, S1 normal  Breasts: normal in size and symmetry, skin normal, physiologic fibronodularity  Abdomen: umbilicus normal.  She does have a right ventral hernia and is scheduled to have her for surgical consult next week. Her hernias not strangulated or bulging.  Musculoskeletal:ROM of all joints is normal, no evidence of joint instability  Lymphatic: None significantly enlarged  Skin: no rash, no edema  Neuro: mental status intact, cranial nerves 2-12 intact  Pelvic: external genitalia normal, cervix normal in appearance, bimanual exam reveals normal uterus, adnexa without masses or tenderness, vaginal mucosa normal      ASSESSMENT/PLAN:  1.annual physical exam: HCM:  Pap and breast exams done.  BSE technique reviewed and patient encouraged to perform self-exam monthly.   Encourage monthly self breast exam  Encourage daily exercise for at least 30 minutes  Recommend mammogram   Recommend 1500 mg Calcium with 600 units vit d " daily.    Discussed Pap smears every 2-3 years today's is normal.  Recommend updated thyroid lab work which is in the computer for her  2.  Morbid obesity: Agree with the patient that amitriptyline is likely not helping her weight loss cause. She was given a written paper on how to slowly taper off of amitriptyline to avoid withdrawal. She will go ahead and start Sonata as she is tapering off of amitriptyline. In about 70-96 hours she will start Wellbutrin as well in the morning for anxiety and appetite suppression. Thien Wellbutrin should also help suppress her urge to smoke. Discussed side effects of Wellbutrin and amitriptyline. I originally encouraged her to wait 4 weeks to start Wellbutrin after she is off of amitriptyline and stable taking Sonata for sleep but she was very resistant to this, anxious to lose weight and obtain help with smoking cessation.    Side effects of all medications prescribed today were discussed with the patient including how to take the medications and proper dosage. Discussed repercussions of not taking the medications as prescribed. Instructed to call the office should she have any negative side effects or problems with the medications.

## 2017-05-24 ENCOUNTER — APPOINTMENT (OUTPATIENT)
Dept: HEALTH INFORMATION MANAGEMENT | Facility: MEDICAL CENTER | Age: 47
End: 2017-05-24
Payer: COMMERCIAL

## 2017-05-24 ENCOUNTER — NON-PROVIDER VISIT (OUTPATIENT)
Dept: MEDICAL GROUP | Facility: LAB | Age: 47
End: 2017-05-24
Payer: COMMERCIAL

## 2017-05-24 DIAGNOSIS — R35.0 URINARY FREQUENCY: ICD-10-CM

## 2017-05-24 DIAGNOSIS — N39.0 ACUTE UTI: ICD-10-CM

## 2017-05-24 LAB
APPEARANCE UR: NORMAL
BILIRUB UR STRIP-MCNC: NORMAL MG/DL
COLOR UR AUTO: NORMAL
GLUCOSE UR STRIP.AUTO-MCNC: 2000 MG/DL
KETONES UR STRIP.AUTO-MCNC: NORMAL MG/DL
LEUKOCYTE ESTERASE UR QL STRIP.AUTO: NORMAL
NITRITE UR QL STRIP.AUTO: NORMAL
PH UR STRIP.AUTO: 5 [PH] (ref 5–8)
PROT UR QL STRIP: 30 MG/DL
RBC UR QL AUTO: NORMAL
SP GR UR STRIP.AUTO: 1.03
UROBILINOGEN UR STRIP-MCNC: 1 MG/DL

## 2017-05-24 PROCEDURE — 81002 URINALYSIS NONAUTO W/O SCOPE: CPT | Performed by: NURSE PRACTITIONER

## 2017-05-24 RX ORDER — NITROFURANTOIN 25; 75 MG/1; MG/1
100 CAPSULE ORAL 2 TIMES DAILY
Qty: 14 CAP | Refills: 0 | Status: SHIPPED | OUTPATIENT
Start: 2017-05-24 | End: 2017-05-31

## 2017-06-01 ENCOUNTER — HOSPITAL ENCOUNTER (OUTPATIENT)
Dept: LAB | Facility: MEDICAL CENTER | Age: 47
End: 2017-06-01
Attending: INTERNAL MEDICINE
Payer: COMMERCIAL

## 2017-06-01 LAB
GLUCOSE SERPL-MCNC: 167 MG/DL (ref 65–99)
T3FREE SERPL-MCNC: 3.15 PG/ML (ref 2.4–4.2)
T4 FREE SERPL-MCNC: 0.91 NG/DL (ref 0.53–1.43)
TSH SERPL DL<=0.005 MIU/L-ACNC: 1.11 UIU/ML (ref 0.3–3.7)

## 2017-06-01 PROCEDURE — 84443 ASSAY THYROID STIM HORMONE: CPT

## 2017-06-01 PROCEDURE — 84481 FREE ASSAY (FT-3): CPT

## 2017-06-01 PROCEDURE — 82947 ASSAY GLUCOSE BLOOD QUANT: CPT

## 2017-06-01 PROCEDURE — 84439 ASSAY OF FREE THYROXINE: CPT

## 2017-06-01 PROCEDURE — 36415 COLL VENOUS BLD VENIPUNCTURE: CPT

## 2017-06-01 RX ORDER — ALPRAZOLAM 1 MG/1
TABLET ORAL
Qty: 90 TAB | Refills: 0 | Status: SHIPPED
Start: 2017-06-01 | End: 2017-06-30 | Stop reason: SDUPTHER

## 2017-06-01 NOTE — TELEPHONE ENCOUNTER
Prescription faxed to:    [facility] [address]  [place] (), [place] - [address]  [address] [place] [address]  [place] () [place] [address]  Phone: [phone] Fax: [phone]

## 2017-06-01 NOTE — TELEPHONE ENCOUNTER
Was the patient seen in the last year in this department? Yes     Does patient have an active prescription for medications requested? No     Received Request Via: Pharmacy     Per  last fill: 5-1-17 # 90

## 2017-06-05 ENCOUNTER — TELEPHONE (OUTPATIENT)
Dept: MEDICAL GROUP | Facility: LAB | Age: 47
End: 2017-06-05

## 2017-06-05 DIAGNOSIS — M25.561 CHRONIC PAIN OF BOTH KNEES: ICD-10-CM

## 2017-06-05 DIAGNOSIS — M25.562 CHRONIC PAIN OF BOTH KNEES: ICD-10-CM

## 2017-06-05 DIAGNOSIS — G89.29 CHRONIC PAIN OF BOTH KNEES: ICD-10-CM

## 2017-06-05 NOTE — TELEPHONE ENCOUNTER
[facility] called stating that the patient has an appointment with them for pain in both knees but the referral we made out is only for acute pain in right knee. Can we put in a new referral for both and send it for a auth. Patient's appointment is this week. Pending in orders.

## 2017-06-19 ENCOUNTER — APPOINTMENT (OUTPATIENT)
Dept: RADIOLOGY | Facility: MEDICAL CENTER | Age: 47
End: 2017-06-19
Attending: NURSE PRACTITIONER
Payer: COMMERCIAL

## 2017-06-20 ENCOUNTER — HOSPITAL ENCOUNTER (OUTPATIENT)
Dept: RADIOLOGY | Facility: MEDICAL CENTER | Age: 47
End: 2017-06-20
Attending: NURSE PRACTITIONER
Payer: COMMERCIAL

## 2017-06-20 DIAGNOSIS — Z12.31 ENCOUNTER FOR SCREENING MAMMOGRAM FOR BREAST CANCER: ICD-10-CM

## 2017-06-20 PROCEDURE — 77063 BREAST TOMOSYNTHESIS BI: CPT

## 2017-06-29 ENCOUNTER — TELEPHONE (OUTPATIENT)
Dept: MEDICAL GROUP | Facility: LAB | Age: 47
End: 2017-06-29

## 2017-06-29 DIAGNOSIS — E03.4 HYPOTHYROIDISM DUE TO ACQUIRED ATROPHY OF THYROID: ICD-10-CM

## 2017-06-29 DIAGNOSIS — E66.01 MORBID OBESITY, UNSPECIFIED OBESITY TYPE (HCC): ICD-10-CM

## 2017-06-29 NOTE — TELEPHONE ENCOUNTER
Patient would like a referral to  [facility]    [facility]E [facility]    [address]  [id number]  [place] [address] [phone]    For obesity and hypothyroid

## 2017-06-30 RX ORDER — ALPRAZOLAM 1 MG/1
TABLET ORAL
Qty: 90 TAB | Refills: 0 | Status: SHIPPED
Start: 2017-06-30 | End: 2017-07-31 | Stop reason: SDUPTHER

## 2017-06-30 NOTE — TELEPHONE ENCOUNTER
Was the patient seen in the last year in this department? Yes  Last fill 6/1/17 #90    Does patient have an active prescription for medications requested? No     Received Request Via: Patient

## 2017-07-03 RX ORDER — AMITRIPTYLINE HYDROCHLORIDE 150 MG/1
TABLET ORAL
Qty: 90 TAB | Refills: 0 | Status: SHIPPED | OUTPATIENT
Start: 2017-07-03 | End: 2017-08-30

## 2017-07-03 NOTE — TELEPHONE ENCOUNTER
Prescription faxed to:    [facility] [address]  [place] (), [place] - [address]  [address] [place] [address]  [place] () [place] [address]  Phone: [phone] Fax: [phone]

## 2017-07-19 ENCOUNTER — TELEPHONE (OUTPATIENT)
Dept: MEDICAL GROUP | Facility: LAB | Age: 47
End: 2017-07-19

## 2017-07-19 NOTE — TELEPHONE ENCOUNTER
"----- Message from ERIKA Cuevas sent at 7/19/2017 12:12 PM PDT -----  Regarding: FW: Non-Urgent Medical Question  Contact: [phone]  Please find out from [facility] exactly what program she is talking about - \"[facility]\" is one they offer.  Then I'll place the referral.  Thanks!    ----- Message -----     From: Lesly Glynn, Med Ass't     Sent: 7/19/2017   7:15 AM       To: ERIKA Cuevas  Subject: FW: Non-Urgent Medical Question                      ----- Message -----     From: Bella Garibay     Sent: 7/18/2017   4:25 PM       To: Lesly Glynn, Med Ass't  Subject: RE: Non-Urgent Medical Question                  This message is being sent by Ion Garibay on behalf of Bella Garibay    That should be fine. Thank you very much  ----- Message -----  From: Lesly Glynn, Med Ass't  Sent: 7/17/17, 1:16 PM  To: Bella Garibay  Subject: RE: Non-Urgent Medical Question    Hi Bella & Reny Lemon is out of the office until Wednesday. Can this wait until then? Lesly    ----- Message -----     From: BELLA GARIBAY     Sent: 7/17/2017 11:57 AM PDT       To: ERIKA Cuevas  Subject: Non-Urgent Medical Question    This message is being sent by Ion Garibay on behalf of Bella Oglesby I am trying to get all of my ducks in a row for my weight-loss surgery. Can I please get a letter of support from your office sent over to Dr. Ganser please. It is also necessary to send a referral for authorization to [facility] for the [employer] program. [facility] phone number is [phone] they can give you the codes you need. Thank you very much for all of your assistance. If I need to come in and help facilitate in anyway please let me know [phone] sincerely Bella Garibay.    "

## 2017-07-31 RX ORDER — ALPRAZOLAM 1 MG/1
TABLET ORAL
Qty: 90 TAB | Refills: 0 | Status: SHIPPED
Start: 2017-07-31 | End: 2017-08-15 | Stop reason: SDUPTHER

## 2017-07-31 NOTE — TELEPHONE ENCOUNTER
Was the patient seen in the last year in this department? Yes   refill 6/30/17  Does patient have an active prescription for medications requested? No     Received Request Via: Patient

## 2017-08-15 ENCOUNTER — OFFICE VISIT (OUTPATIENT)
Dept: MEDICAL GROUP | Facility: LAB | Age: 47
End: 2017-08-15
Payer: COMMERCIAL

## 2017-08-15 VITALS
SYSTOLIC BLOOD PRESSURE: 107 MMHG | TEMPERATURE: 98.4 F | RESPIRATION RATE: 12 BRPM | OXYGEN SATURATION: 94 % | HEART RATE: 92 BPM | HEIGHT: 66 IN | DIASTOLIC BLOOD PRESSURE: 70 MMHG | BODY MASS INDEX: 44.03 KG/M2 | WEIGHT: 274 LBS

## 2017-08-15 DIAGNOSIS — F41.9 ANXIETY: ICD-10-CM

## 2017-08-15 DIAGNOSIS — E66.01 MORBID OBESITY WITH BMI OF 40.0-44.9, ADULT (HCC): ICD-10-CM

## 2017-08-15 DIAGNOSIS — J01.00 ACUTE NON-RECURRENT MAXILLARY SINUSITIS: ICD-10-CM

## 2017-08-15 PROCEDURE — 99214 OFFICE O/P EST MOD 30 MIN: CPT | Performed by: NURSE PRACTITIONER

## 2017-08-15 RX ORDER — FLUTICASONE PROPIONATE 50 MCG
2 SPRAY, SUSPENSION (ML) NASAL DAILY
Qty: 1 BOTTLE | Refills: 3 | Status: SHIPPED | OUTPATIENT
Start: 2017-08-15 | End: 2018-08-31

## 2017-08-15 RX ORDER — ALPRAZOLAM 1 MG/1
TABLET ORAL
Qty: 90 TAB | Refills: 3 | Status: SHIPPED
Start: 2017-08-15 | End: 2017-12-11 | Stop reason: SDUPTHER

## 2017-08-15 RX ORDER — AMOXICILLIN AND CLAVULANATE POTASSIUM 875; 125 MG/1; MG/1
1 TABLET, FILM COATED ORAL 2 TIMES DAILY
Qty: 20 TAB | Refills: 0 | Status: SHIPPED | OUTPATIENT
Start: 2017-08-15 | End: 2019-03-14 | Stop reason: SDUPTHER

## 2017-08-15 NOTE — MR AVS SNAPSHOT
"        Bella Robertswoody   8/15/2017 9:40 AM   Office Visit   MRN: [id number]    Department:  [facility]   Dept Phone:  [phone]    Description:  Female : 1970   Provider:  ERIKA Cuevas           Reason for Visit     Pharyngitis pt states she has sore throat, nasal congestion, cough, no fever, onset 6 days       Allergies as of 8/15/2017     Allergen Noted Reactions    Nkda [No Known Drug Allergy] 09/15/2010         You were diagnosed with     Acute non-recurrent maxillary sinusitis   [6618480]       Anxiety   [207542]       Body mass index (BMI) of 40.0 to 44.9 in adult (CMS-HCC)   [7547091]         Vital Signs     Blood Pressure Pulse Temperature Respirations Height Weight    107/70 mmHg 92 36.9 °C (98.4 °F) 12 1.676 m (5' 5.98\") 124.286 kg (274 lb)    Body Mass Index Oxygen Saturation Smoking Status             44.25 kg/m2 94% Former Smoker         Basic Information     Date Of Birth Sex Race Ethnicity Preferred Language    1970 Female White Non- English      Problem List              ICD-10-CM Priority Class Noted - Resolved    Obesity E66.9   2009 - Present    Chronic back pain M54.9, G89.29   2011 - Present    Anxiety F41.9   2011 - Present    Insomnia G47.00   3/20/2014 - Present    Hypothyroidism due to acquired atrophy of thyroid E03.4   2015 - Present    Right knee pain M25.561   2016 - Present    Morbid obesity (CMS-HCC) E66.01   2017 - Present      Health Maintenance        Date Due Completion Dates    IMM DTaP/Tdap/Td Vaccine (1 - Tdap) 1988 ---    IMM INFLUENZA (1) 2015    MAMMOGRAM 2018, 2016, 2015, 3/31/2014, 3/29/2013, 2010    PAP SMEAR 2020, 10/24/2013, 9/15/2010            Current Immunizations     Influenza Vaccine Quad Inj (Pf) 2015 11:04 AM      Below and/or attached are the medications your provider expects you to take. Review all of your home " medications and newly ordered medications with your provider and/or pharmacist. Follow medication instructions as directed by your provider and/or pharmacist. Please keep your medication list with you and share with your provider. Update the information when medications are discontinued, doses are changed, or new medications (including over-the-counter products) are added; and carry medication information at all times in the event of emergency situations     Allergies:  NKDA - (reactions not documented)               Medications  Valid as of: August 15, 2017 - 10:50 AM    Generic Name Brand Name Tablet Size Instructions for use    Acetaminophen-Codeine (Tab) TYLENOL #3 300-30 MG TAKE ONE TO TWO TABLETS BY MOUTH AT BEDTIME AS NEEDED FOR PAIN        ALPRAZolam (Tab) XANAX 1 MG TAKE ONE-HALF TO ONE TABLET BY MOUTH BY MOUTH THREE TIMES DAILY AS NEEDED FOR ANXIETY        Amitriptyline HCl (Tab) ELAVIL 150 MG Take 1 Tab by mouth at bedtime as needed for Sleep.        Amitriptyline HCl (Tab) ELAVIL 50 MG TAKE ONE TO TWO TABLETS BY MOUTH AT BEDTIME AS NEEDED FOR SLEEP        Amitriptyline HCl (Tab) ELAVIL 150 MG TAKE ONE TABLET BY MOUTH ONCE DAILY AT BEDTIME AS NEEDED FOR SLEEP        Amoxicillin-Pot Clavulanate (Tab) AUGMENTIN 875-125 MG Take 1 Tab by mouth 2 times a day for 10 days.        BuPROPion HCl (TABLET SR 24 HR) WELLBUTRIN  MG Take 1 Tab by mouth every morning.        Cyclobenzaprine HCl (Tab) FLEXERIL 10 MG TAKE ONE TABLET BY MOUTH TWICE DAILY AS NEEDED        Fluticasone Propionate (Suspension) FLONASE 50 MCG/ACT Spray 2 Sprays in nose every day.        Garcinia Cambogia-Chromium (Tab) Garcinia Cambogia-Chromium 500-200 MG-MCG Take  by mouth.        Hydrocod Polst-Chlorphen Polst (Suspension Extended Release) TUSSIONEX 10-8 MG/5ML Take 5 mL by mouth every 12 hours.        Hydrocodone-Acetaminophen (Tab) NORCO 5-325 MG Take 1 Tab by mouth every 6 hours as needed.        Ibuprofen (Tab) MOTRIN 800 MG Take 1  Tab by mouth every 8 hours as needed for Mild Pain.        Levothyroxine Sodium (Tab) SYNTHROID 175 MCG TAKE ONE TABLET BY MOUTH ONCE DAILY        Nabumetone (Tab) RELAFEN 500 MG Take 1 Tab by mouth 2 times a day. With food in place of ibuprofen for knee inflammation        Nitrofurantoin Monohyd Macro (Cap) MACROBID 100 MG TAKE ONE CAPSULE BY MOUTH TWICE DAILY FOR 7 DAYS, AND THEN TAKE ONE CAP IMMEDIATELY AFTER INTERCOURSE        Somatropin (Recon Soln) Somatropin 0.2 MG Inject 4 mg as instructed.        Somatropin   Inject 0.08 Each as instructed every day.        Zaleplon (Cap) SONATA 10 MG Take 1 Cap by mouth every day.        .                 Medicines prescribed today were sent to:     [facility] (), [place] - [address]    [address] () [place] [address]    Phone: [phone] Fax: [phone]    Open 24 Hours?: No    [facility] [address] [place] [address] [address] [address]    Phone: [phone] Fax: [phone]    Open 24 Hours?: Yes      Medication refill instructions:       If your prescription bottle indicates you have medication refills left, it is not necessary to call your provider’s office. Please contact your pharmacy and they will refill your medication.    If your prescription bottle indicates you do not have any refills left, you may request refills at any time through one of the following ways: The online [employer] system (except Urgent Care), by calling your provider’s office, or by asking your pharmacy to contact your provider’s office with a refill request. Medication refills are processed only during regular business hours and may not be available until the next business day. Your provider may request additional information or to have a follow-up visit with you prior to refilling your medication.   *Please Note: Medication refills are assigned a new Rx number when refilled electronically.  Your pharmacy may indicate that no refills were authorized even though a new prescription for the same medication is available at the pharmacy. Please request the medicine by name with the pharmacy before contacting your provider for a refill.        Referral     A referral request has been sent to our patient care coordination department. Please allow 3-5 business days for us to process this request and contact you either by phone or mail. If you do not hear from us by the 5th business day, please call us at [phone].           [employer] Access Code: Activation code not generated  Current [employer] Status: Active

## 2017-08-23 ENCOUNTER — TELEPHONE (OUTPATIENT)
Dept: MEDICAL GROUP | Facility: LAB | Age: 47
End: 2017-08-23

## 2017-08-23 NOTE — TELEPHONE ENCOUNTER
ESTABLISHED PATIENT PRE-VISIT PLANNING     Note: Patient will not be contacted if there is no indication to call.     1.  Reviewed notes from the last few office visits within the medical group: Yes.  Last ov 8/15/17 for pharyngitis.  Had ortho f/u at [facility] with Dr. Gonzales on 4/15/17 for knee pain.  Has appointment with Kavya Arias on 9/19/17    2.  If any orders were placed at last visit or intended to be done for this visit (i.e. 6 mos follow-up), do we have Results/Consult Notes?        •  Labs - Labs ordered, completed and results are in chart.  Done by Dr. Urbina on 6/11/17.       •  Imaging - Imaging ordered, completed and results are in chart.  Mammogram completed on 6/20/17       •  Referrals - Referral ordered, patient was seen and consult notes are in chart. Care Teams updated  YES.    3. Is this appointment scheduled as a Hospital Follow-Up? No    4.  Immunizations were updated in [facility] using WebIZ?: Yes       •  Web Iz Recommendations: MMR , TDAP and VARICELLA (Chicken Pox)     5.  Patient is due for the following Health Maintenance Topics:   Health Maintenance Due   Topic Date Due   • IMM DTaP/Tdap/Td Vaccine (1 - Tdap) 08/06/1988   • IMM INFLUENZA (1) 09/01/2017       - Patient has completed FLU Immunization(s) per WebIZ. Chart has been updated.    6.  Patient was NOT informed to arrive 15 min prior to their scheduled appointment and bring in their medication bottles.

## 2017-08-24 ENCOUNTER — OFFICE VISIT (OUTPATIENT)
Dept: MEDICAL GROUP | Facility: LAB | Age: 47
End: 2017-08-24
Payer: COMMERCIAL

## 2017-08-24 VITALS
TEMPERATURE: 97.9 F | HEIGHT: 66 IN | RESPIRATION RATE: 12 BRPM | HEART RATE: 92 BPM | WEIGHT: 270 LBS | BODY MASS INDEX: 43.39 KG/M2 | DIASTOLIC BLOOD PRESSURE: 70 MMHG | SYSTOLIC BLOOD PRESSURE: 92 MMHG | OXYGEN SATURATION: 97 %

## 2017-08-24 DIAGNOSIS — M54.41 CHRONIC BILATERAL LOW BACK PAIN WITH RIGHT-SIDED SCIATICA: ICD-10-CM

## 2017-08-24 DIAGNOSIS — F51.01 PRIMARY INSOMNIA: ICD-10-CM

## 2017-08-24 DIAGNOSIS — E66.01 MORBID OBESITY WITH BMI OF 40.0-44.9, ADULT (HCC): ICD-10-CM

## 2017-08-24 DIAGNOSIS — E11.9 CONTROLLED TYPE 2 DIABETES MELLITUS WITHOUT COMPLICATION, WITHOUT LONG-TERM CURRENT USE OF INSULIN (HCC): ICD-10-CM

## 2017-08-24 DIAGNOSIS — G89.29 CHRONIC BILATERAL LOW BACK PAIN WITH RIGHT-SIDED SCIATICA: ICD-10-CM

## 2017-08-24 PROCEDURE — 99214 OFFICE O/P EST MOD 30 MIN: CPT | Performed by: NURSE PRACTITIONER

## 2017-08-24 NOTE — MR AVS SNAPSHOT
"        Bella Fagan Phoenix   2017 3:20 PM   Office Visit   MRN: [id number]    Department:  [facility]   Dept Phone:  [phone]    Description:  Female : 1970   Provider:  ERIKA Cuevas           Reason for Visit     Other pt needs assessment for medically supervised weight loss program       Allergies as of 2017     Allergen Noted Reactions    Nkda [No Known Drug Allergy] 09/15/2010         You were diagnosed with     Controlled type 2 diabetes mellitus without complication, without long-term current use of insulin (CMS-HCC)   [7703158]       Chronic bilateral low back pain with right-sided sciatica   [9544053]       Morbid obesity with BMI of 40.0-44.9, adult (CMS-HCC)   [016027]       Primary insomnia   [948439]         Vital Signs     Blood Pressure Pulse Temperature Respirations Height Weight    92/70 mmHg 92 36.6 °C (97.9 °F) 12 1.676 m (5' 5.98\") 122.471 kg (270 lb)    Body Mass Index Oxygen Saturation Smoking Status             43.60 kg/m2 97% Former Smoker         Basic Information     Date Of Birth Sex Race Ethnicity Preferred Language    1970 Female White Non- English      Your appointments     Sep 19, 2017  4:00 PM   Follow Up Visit with Kavya Hickman RD   [facility])    [address]  [address]  [facility] [id number] [phone]           It is the patient's responsibility to check with your Insurance for benefit coverage for visit / visits.  24 hours notice is required for all appointment changes or cancellation.  Please arrive 20 min. before your appointment time  Please bring the following with you: 1)Picture Id 2) Insurance card 3) Completed Forms if New Patient  If scheduled for DIABETES VISIT please also brin) Medications 2) Meter 3) Blood glucose logs 4) Any recent labs if you have them  If scheduled for NUTRITION VISIT please also brin) 2-3 days of detailed food intake logs 2) Blood glucose monitor " and blood glucose logs (if you have them)              Problem List              ICD-10-CM Priority Class Noted - Resolved    Obesity E66.9   7/24/2009 - Present    Anxiety F41.9   9/6/2011 - Present    Insomnia G47.00   3/20/2014 - Present    Hypothyroidism due to acquired atrophy of thyroid E03.4   11/6/2015 - Present    Right knee pain M25.561   6/16/2016 - Present    Morbid obesity (CMS-HCC) E66.01   5/17/2017 - Present    Morbid obesity with BMI of 40.0-44.9, adult ([facility]) E66.01, Z68.41   8/15/2017 - Present    Controlled type 2 diabetes mellitus without complication (CMS-HCC) E11.9   8/24/2017 - Present    Chronic bilateral low back pain with right-sided sciatica M54.41, G89.29   8/24/2017 - Present      Health Maintenance        Date Due Completion Dates    IMM DTaP/Tdap/Td Vaccine (1 - Tdap) 8/6/1988 ---    IMM INFLUENZA (1) 9/1/2017 12/16/2015    MAMMOGRAM 6/20/2018 6/20/2017, 4/7/2016, 4/1/2015, 3/31/2014, 3/29/2013, 9/30/2010    PAP SMEAR 5/17/2020 5/17/2017, 10/24/2013, 9/15/2010            Current Immunizations     Influenza Vaccine Quad Inj (Pf) 12/16/2015 11:04 AM      Below and/or attached are the medications your provider expects you to take. Review all of your home medications and newly ordered medications with your provider and/or pharmacist. Follow medication instructions as directed by your provider and/or pharmacist. Please keep your medication list with you and share with your provider. Update the information when medications are discontinued, doses are changed, or new medications (including over-the-counter products) are added; and carry medication information at all times in the event of emergency situations     Allergies:  NKDA - (reactions not documented)               Medications  Valid as of: August 24, 2017 -  3:50 PM    Generic Name Brand Name Tablet Size Instructions for use    Acetaminophen-Codeine (Tab) TYLENOL #3 300-30 MG TAKE ONE TO TWO TABLETS BY MOUTH AT BEDTIME AS NEEDED FOR  PAIN        ALPRAZolam (Tab) XANAX 1 MG TAKE ONE-HALF TO ONE TABLET BY MOUTH BY MOUTH THREE TIMES DAILY AS NEEDED FOR ANXIETY        Amitriptyline HCl (Tab) ELAVIL 150 MG Take 1 Tab by mouth at bedtime as needed for Sleep.        Amitriptyline HCl (Tab) ELAVIL 50 MG TAKE ONE TO TWO TABLETS BY MOUTH AT BEDTIME AS NEEDED FOR SLEEP        Amitriptyline HCl (Tab) ELAVIL 150 MG TAKE ONE TABLET BY MOUTH ONCE DAILY AT BEDTIME AS NEEDED FOR SLEEP        Amoxicillin-Pot Clavulanate (Tab) AUGMENTIN 875-125 MG Take 1 Tab by mouth 2 times a day for 10 days.        BuPROPion HCl (TABLET SR 24 HR) WELLBUTRIN  MG Take 1 Tab by mouth every morning.        Cyclobenzaprine HCl (Tab) FLEXERIL 10 MG TAKE ONE TABLET BY MOUTH TWICE DAILY AS NEEDED        Empagliflozin (Tab) Empagliflozin 25 MG Take 1 Tab by mouth every day.        Fluticasone Propionate (Suspension) FLONASE 50 MCG/ACT Spray 2 Sprays in nose every day.        Garcinia Cambogia-Chromium (Tab) Garcinia Cambogia-Chromium 500-200 MG-MCG Take  by mouth.        Hydrocod Polst-Chlorphen Polst (Suspension Extended Release) TUSSIONEX 10-8 MG/5ML Take 5 mL by mouth every 12 hours.        Hydrocodone-Acetaminophen (Tab) NORCO 5-325 MG Take 1 Tab by mouth every 6 hours as needed.        Ibuprofen (Tab) MOTRIN 800 MG Take 1 Tab by mouth every 8 hours as needed for Mild Pain.        Levothyroxine Sodium (Tab) SYNTHROID 175 MCG TAKE ONE TABLET BY MOUTH ONCE DAILY        Nabumetone (Tab) RELAFEN 500 MG Take 1 Tab by mouth 2 times a day. With food in place of ibuprofen for knee inflammation        Nitrofurantoin Monohyd Macro (Cap) MACROBID 100 MG TAKE ONE CAPSULE BY MOUTH TWICE DAILY FOR 7 DAYS, AND THEN TAKE ONE CAP IMMEDIATELY AFTER INTERCOURSE        Somatropin (Recon Soln) Somatropin 0.2 MG Inject 4 mg as instructed.        Somatropin   Inject 0.08 Each as instructed every day.        .                 Medicines prescribed today were sent to:     [facility] [address] [place] [address]  ([place]), [place] - [address]    [address] [place] ([place]) [place] [address]    Phone: [phone] Fax: [phone]    Open 24 Hours?: No    [id number] - [place], [place] - [address] [facility] [address]    [address] [facility] [place] [address]    Phone: [phone] Fax: [phone]    Open 24 Hours?: Yes      Medication refill instructions:       If your prescription bottle indicates you have medication refills left, it is not necessary to call your provider’s office. Please contact your pharmacy and they will refill your medication.    If your prescription bottle indicates you do not have any refills left, you may request refills at any time through one of the following ways: The online [employer] system (except Urgent Care), by calling your provider’s office, or by asking your pharmacy to contact your provider’s office with a refill request. Medication refills are processed only during regular business hours and may not be available until the next business day. Your provider may request additional information or to have a follow-up visit with you prior to refilling your medication.   *Please Note: Medication refills are assigned a new Rx number when refilled electronically. Your pharmacy may indicate that no refills were authorized even though a new prescription for the same medication is available at the pharmacy. Please request the medicine by name with the pharmacy before contacting your provider for a refill.           [employer] Access Code: Activation code not generated  Current [employer] Status: Active

## 2017-08-24 NOTE — PROGRESS NOTES
"Chief Complaint   Patient presents with   • Other     pt needs assessment for medically supervised weight loss program        HPI  Tammie is a 47-year-old established female here to start medical provider supervised weight loss program in order to qualify for a gastric sleeve. She feels very motivated to move forward with weight loss and surgery. She has comorbid conditions of chronic low back and knee pain, type 2 diabetes, anxiety and sleep disturbance. She also works with an endocrinologist, Dr. Urbina, regarding her type 2 diabetes. She is currently taking Jardiance for diabetes and feels this works well for her, all blood work is done through Dr. Urbina, last blood sugar check was 167 and she reports he does not do an A1c regularly.  She states that she's been told by her orthopedic surgeon that she needs to lose a drastic amount of weight because of her knee and back pain.  For lifestyle modifications, she is doing water aerobics, taking Wellbutrin and watching her portions.  She has been successful in losing about 20 pounds in the last 4 months by eliminating white carbohydrates.      Past medical, surgical, family, and social history is reviewed and updated in Epic chart by me today.   Medications and allergies reviewed and updated in Epic chart by me today.     ROS:   As documented in history of present illness above    Exam:  Blood pressure 92/70, pulse 92, temperature 36.6 °C (97.9 °F), resp. rate 12, height 1.676 m (5' 5.98\"), weight 122.471 kg (270 lb), SpO2 97 %.  Constitutional: Morbidly obese female, Alert, no distress, plus 3 vital signs  Skin:  Warm, dry, no rashes invisible areas  Eye: Equal, round and reactive, conjunctiva clear  ENMT: Lips without lesions, good dentition, oropharynx clear    Neck: Trachea midline  Respiratory: Unlabored respiration, lungs clear to auscultation, no wheezes, no rhonchi  Cardiovascular: Normal rate and rhythm  Psych: Alert, pleasant, well-groomed, normal " affect    A/P:  1. Controlled type 2 diabetes mellitus without complication, without long-term current use of insulin (CMS-HCC)  -Updated her medication list with what Dr. Urbina is prescribing.  - Empagliflozin (JARDIANCE) 25 MG Tab; Take 1 Tab by mouth every day.  Dispense: 30 Tab; Refill: 4    2. Chronic bilateral low back pain with right-sided sciatica  -She is being diligent about water aerobics which does seem to help her back.     3. Morbid obesity with BMI of 40.0-44.9, adult ([facility])  -improving!  Continue with efforts, completed paperwork and faxed. Follow up monthly. Continue with Wellbutrin, dietary and lifestyle modifications.     4.  Sleep disturbance:   Still taking amitriptyline 200 mg at night.  Sonata & ambien didn't work.

## 2017-09-13 RX ORDER — LEVOTHYROXINE SODIUM 175 UG/1
TABLET ORAL
Qty: 90 TAB | Refills: 1 | Status: SHIPPED | OUTPATIENT
Start: 2017-09-13 | End: 2018-03-13 | Stop reason: SDUPTHER

## 2017-09-13 NOTE — TELEPHONE ENCOUNTER
Was the patient seen in the last year in this department? Yes     Does patient have an active prescription for medications requested? No     Received Request Via: Pharmacy     Last visit:8/24/17

## 2017-09-14 ENCOUNTER — HOSPITAL ENCOUNTER (OUTPATIENT)
Dept: LAB | Facility: MEDICAL CENTER | Age: 47
End: 2017-09-14
Payer: COMMERCIAL

## 2017-09-14 LAB
BDY FAT % MEASURED: 46.5 %
BP DIAS: 72 MMHG
BP SYS: 97 MMHG
CHOLEST SERPL-MCNC: 167 MG/DL (ref 100–199)
DIABETES HTDIA: YES
EVENT NAME HTEVT: NORMAL
FASTING HTFAS: YES
GLUCOSE SERPL-MCNC: 89 MG/DL (ref 65–99)
HDLC SERPL-MCNC: 43 MG/DL
HYPERTENSION HTHYP: NO
LDLC SERPL CALC-MCNC: 95 MG/DL
SCREENING LOC CITY HTCIT: NORMAL
SCREENING LOC STATE HTSTA: NORMAL
SCREENING LOCATION HTLOC: NORMAL
SMOKING HTSMO: NO
SUBSCRIBER ID HTSID: NORMAL
TRIGL SERPL-MCNC: 147 MG/DL (ref 0–149)

## 2017-09-14 PROCEDURE — S5190 WELLNESS ASSESSMENT BY NONPH: HCPCS

## 2017-09-14 PROCEDURE — 83036 HEMOGLOBIN GLYCOSYLATED A1C: CPT

## 2017-09-14 PROCEDURE — 82947 ASSAY GLUCOSE BLOOD QUANT: CPT

## 2017-09-14 PROCEDURE — 80061 LIPID PANEL: CPT

## 2017-09-14 PROCEDURE — 36415 COLL VENOUS BLD VENIPUNCTURE: CPT

## 2017-09-15 LAB
EST. AVERAGE GLUCOSE BLD GHB EST-MCNC: 114 MG/DL
HBA1C MFR BLD: 5.6 % (ref 0–5.6)

## 2017-09-19 ENCOUNTER — APPOINTMENT (OUTPATIENT)
Dept: HEALTH INFORMATION MANAGEMENT | Facility: MEDICAL CENTER | Age: 47
End: 2017-09-19
Payer: COMMERCIAL

## 2017-10-16 ENCOUNTER — TELEPHONE (OUTPATIENT)
Dept: MEDICAL GROUP | Facility: LAB | Age: 47
End: 2017-10-16

## 2017-10-16 NOTE — TELEPHONE ENCOUNTER
ESTABLISHED PATIENT PRE-VISIT PLANNING     Note: Patient will not be contacted if there is no indication to call.     1.  Reviewed notes from the last few office visits within the medical group: Yes    2.  If any orders were placed at last visit or intended to be done for this visit (i.e. 6 mos follow-up), do we have Results/Consult Notes?        •  Labs - Labs were not ordered at last office visit.   Note: If patient appointment is for lab review and patient did not complete labs,                check with provider if OK to reschedule patient until labs completed.       •  Imaging - Imaging was not ordered at last office visit.       •  Referrals - No referrals were ordered at last office visit.    3. Is this appointment scheduled as a Hospital Follow-Up? No    4.  Immunizations were updated in [employer] using WebIZ?: Yes       •  Web Iz Recommendations: FLU, HEPATITIS B, PNEUMOVAX (PPSV23) and TDAP    5.  Patient is due for the following Health Maintenance Topics:   Health Maintenance Due   Topic Date Due   • IMM HEP B VACCINE (1 of 3 - Primary Series) 1970   • DIABETES MONOFILAMENT / LE EXAM  02/06/1970   • RETINAL SCREENING  08/06/1987   • URINE ACR / MICROALBUMIN  08/06/1987   • IMM DTaP/Tdap/Td Vaccine (1 - Tdap) 08/06/1988   • IMM PNEUMOCOCCAL 19-64 (ADULT) MEDIUM RISK SERIES (1 of 1 - PPSV23) 08/06/1988   • IMM INFLUENZA (1) 09/01/2017           6.  Patient was NOT informed to arrive 15 min prior to their scheduled appointment and bring in their medication bottles.

## 2017-10-17 ENCOUNTER — OFFICE VISIT (OUTPATIENT)
Dept: MEDICAL GROUP | Facility: LAB | Age: 47
End: 2017-10-17
Payer: COMMERCIAL

## 2017-10-17 VITALS
OXYGEN SATURATION: 96 % | DIASTOLIC BLOOD PRESSURE: 62 MMHG | RESPIRATION RATE: 12 BRPM | TEMPERATURE: 96.3 F | HEART RATE: 102 BPM | WEIGHT: 265 LBS | SYSTOLIC BLOOD PRESSURE: 98 MMHG | BODY MASS INDEX: 42.59 KG/M2 | HEIGHT: 66 IN

## 2017-10-17 DIAGNOSIS — E11.9 CONTROLLED TYPE 2 DIABETES MELLITUS WITHOUT COMPLICATION, WITHOUT LONG-TERM CURRENT USE OF INSULIN (HCC): ICD-10-CM

## 2017-10-17 DIAGNOSIS — K43.9 ABDOMINAL WALL HERNIA: ICD-10-CM

## 2017-10-17 DIAGNOSIS — F41.9 ANXIETY: ICD-10-CM

## 2017-10-17 DIAGNOSIS — Z23 NEED FOR INFLUENZA VACCINATION: ICD-10-CM

## 2017-10-17 DIAGNOSIS — E66.01 MORBID OBESITY WITH BMI OF 40.0-44.9, ADULT (HCC): ICD-10-CM

## 2017-10-17 PROCEDURE — 99214 OFFICE O/P EST MOD 30 MIN: CPT | Performed by: NURSE PRACTITIONER

## 2017-10-17 ASSESSMENT — PATIENT HEALTH QUESTIONNAIRE - PHQ9: CLINICAL INTERPRETATION OF PHQ2 SCORE: 0

## 2017-10-17 NOTE — PROGRESS NOTES
"Chief Complaint   Patient presents with   • Weight Loss     weight loss for surgery        HPI  Leia is a 47-year-old established female here to follow-up on a couple of things:  #1-she has a right abdominal wall hernia and has been told that this may not be repaired until she drops below 200 pounds. She finds it difficult to exercise because she worries about worsening her hernia. She loves to do water aerobics. She is not currently having any abdominal pain. She would like a solution to not being able to exercise.  #2-morbid obesity: Chronic issue. Has been successful in losing another 5 pounds since her visit in August. She is avoiding all white carbohydrates, eating small/frequent meals and concentrating on lean protein. She limits herself to 30 g of carbs or less per day. She is not exercising. She would like to have a gastric sleeve done.  #3-type 2 diabetes: Chronic issue. Continues with care through Dr. Urbina, endocrinology. Is treated with Jardiance and states her blood sugars have been stable, she cannot recall any specific readings today. Last A1C was prediabetic with A1C of 5.6 in Sept.  Denies any negative side effects of her diabetic medication. Denies numbness or tingling to her feet or vision changes.      Past medical, surgical, family, and social history is reviewed and updated in Epic chart by me today.   Medications and allergies reviewed and updated in Epic chart by me today.     ROS:   As documented in history of present illness above    Exam:  Blood pressure (!) 98/62, pulse (!) 102, temperature (!) 35.7 °C (96.3 °F), resp. rate 12, height 1.676 m (5' 5.98\"), weight 120.2 kg (265 lb), SpO2 96 %.  Constitutional: Alert, no distress, plus 3 vital signs  Skin:  Warm, dry, no rashes invisible areas  Eye: Equal, round and reactive, conjunctiva clear  ENMT: Lips without lesions, good dentition, oropharynx clear    Neck: Trachea midline  Respiratory: Unlabored respiration, lungs clear to " auscultation, no wheezes, no rhonchi  Cardiovascular: Normal rate and rhythm  Abdomen: Soft with mild tenderness to right lower abdominal wall but there is no strangulated hernia apparent.  Psych: Alert, pleasant, well-groomed, normal affect    A/P:  1. Abdominal wall hernia  -Encouraged her to try a hernia belt and reenter exercise.  - NON SPECIFIED; Hernia belt    Dx:   Right abdominal wall hernia  Dispense: 1 Each; Refill: 0    2. Morbid obesity with BMI of 40.0-44.9, adult ([facility])  -Discussed the importance of avoiding binging on her one chief day per week. She'll continue on a high lean protein/vegetable based diet. Encouraged her to incorporate exercise. Month 2 of physician supervised weight loss program paperwork completed and faxed over to [facility]. Follow-up in one month.    3. Controlled type 2 diabetes mellitus without complication, without long-term current use of insulin (CMS-HCC)  -Followed by endocrinology. Stable.    4. Need for influenza vaccination  I have placed the below orders and discussed them with Dr. Perez. the MA is performing the below orders under the direction of Dr. NORIEGA  - INFLUENZA VACCINE QUAD INJ >3Y(PF)

## 2017-10-17 NOTE — TELEPHONE ENCOUNTER
Was the patient seen in the last year in this department? Yes     Does patient have an active prescription for medications requested? No     Received Request Via: Pharmacy     Last visit:8/24/17    Last  fill:8/24/17

## 2017-11-06 RX ORDER — AMITRIPTYLINE HYDROCHLORIDE 150 MG/1
TABLET ORAL
Qty: 90 TAB | Refills: 3 | Status: SHIPPED | OUTPATIENT
Start: 2017-11-06 | End: 2019-02-17 | Stop reason: SDUPTHER

## 2017-11-06 NOTE — TELEPHONE ENCOUNTER
Was the patient seen in the last year in this department? Yes Lov 10/17/2017    Does patient have an active prescription for medications requested? No     Received Request Via: Pharmacy

## 2017-11-08 ENCOUNTER — APPOINTMENT (OUTPATIENT)
Dept: HEALTH INFORMATION MANAGEMENT | Facility: MEDICAL CENTER | Age: 47
End: 2017-11-08
Payer: COMMERCIAL

## 2017-11-14 ENCOUNTER — OFFICE VISIT (OUTPATIENT)
Dept: MEDICAL GROUP | Facility: LAB | Age: 47
End: 2017-11-14
Payer: COMMERCIAL

## 2017-11-14 VITALS
RESPIRATION RATE: 12 BRPM | OXYGEN SATURATION: 93 % | HEIGHT: 66 IN | HEART RATE: 94 BPM | SYSTOLIC BLOOD PRESSURE: 98 MMHG | BODY MASS INDEX: 42.27 KG/M2 | TEMPERATURE: 98.4 F | DIASTOLIC BLOOD PRESSURE: 78 MMHG | WEIGHT: 263 LBS

## 2017-11-14 DIAGNOSIS — L20.82 FLEXURAL ECZEMA: ICD-10-CM

## 2017-11-14 DIAGNOSIS — Z23 NEED FOR TDAP VACCINATION: ICD-10-CM

## 2017-11-14 DIAGNOSIS — F33.1 MODERATE EPISODE OF RECURRENT MAJOR DEPRESSIVE DISORDER (HCC): ICD-10-CM

## 2017-11-14 DIAGNOSIS — E66.01 MORBID OBESITY, UNSPECIFIED OBESITY TYPE (HCC): ICD-10-CM

## 2017-11-14 DIAGNOSIS — E11.9 CONTROLLED TYPE 2 DIABETES MELLITUS WITHOUT COMPLICATION, WITHOUT LONG-TERM CURRENT USE OF INSULIN (HCC): ICD-10-CM

## 2017-11-14 DIAGNOSIS — Z23 NEED FOR HEPATITIS B VACCINATION: ICD-10-CM

## 2017-11-14 PROCEDURE — 99214 OFFICE O/P EST MOD 30 MIN: CPT | Mod: 25 | Performed by: NURSE PRACTITIONER

## 2017-11-14 PROCEDURE — 90472 IMMUNIZATION ADMIN EACH ADD: CPT | Performed by: NURSE PRACTITIONER

## 2017-11-14 PROCEDURE — 90715 TDAP VACCINE 7 YRS/> IM: CPT | Performed by: NURSE PRACTITIONER

## 2017-11-14 PROCEDURE — 90471 IMMUNIZATION ADMIN: CPT | Performed by: NURSE PRACTITIONER

## 2017-11-14 PROCEDURE — 90746 HEPB VACCINE 3 DOSE ADULT IM: CPT | Performed by: NURSE PRACTITIONER

## 2017-11-14 RX ORDER — BUPROPION HYDROCHLORIDE 300 MG/1
300 TABLET ORAL EVERY MORNING
Qty: 30 TAB | Refills: 5 | Status: SHIPPED | OUTPATIENT
Start: 2017-11-14 | End: 2019-07-27

## 2017-11-14 RX ORDER — TRIAMCINOLONE ACETONIDE 1 MG/G
1 OINTMENT TOPICAL 2 TIMES DAILY
Qty: 80 G | Refills: 2 | Status: SHIPPED | OUTPATIENT
Start: 2017-11-14 | End: 2018-08-31

## 2017-11-14 NOTE — PROGRESS NOTES
"Chief Complaint   Patient presents with   • Psoriasis     pt has psoriasis on the back of her legs    • Orders Needed     lab work    • Immunizations     Hep B, Tdap, Pnuemonia        HPI  Tammie is a 47-year-old established female here with a few things:  #1-eczema: Chronic issue. Flares up when she is under a lot of stress. Has been under a lot of stress lately with finances, her  and her 's ailing parents who live with them. She has itching, feeling of very dry skin behind both knees and in between her thighs. She does use topical over-the-counter hydrocortisone without improvement.  #2-depression: Chronic issue. Currently taking amitriptyline at night prior to bedtime, Wellbutrin 150 mg in the morning she does takes [place] a few times a day as needed for anxiety. Finds herself feeling tearful, frustrated and hopeless. In a difficult situation at home with her , his ailing parents and her  spending more time taking care of his parents and with her. She feels stuck. She denies any suicidal or homicidal ideations. No other associated symptoms. Denies self-harm. She is seeing a therapist.  #3-type 2 diabetes: Chronic issue. Now in a prediabetic stage with an A1c of 5.6 in September. Continues on Jardiance and seeing Dr. Urbina about once per year because of lack of insurance coverage at his office. Denies any numbness or tingling in her feet. Has not been exercising for the past 3 months but plans on restarting. Was considering bariatric surgery but decided to avoid all white carbohydrates, exercise and lose weight on her own. She has lost 30 pounds in the past year.      Past medical, surgical, family, and social history is reviewed and updated in Epic chart by me today.   Medications and allergies reviewed and updated in Epic chart by me today.     ROS:   As documented in history of present illness above    Exam:  Resp. rate 12, height 1.676 m (5' 5.98\"), weight 119.3 kg (263 " lb).  Constitutional: Alert, no distress, plus 3 vital signs  Skin:  Warm, dry.  She has dry, erythematous and scaling skin to the popliteal aspect of both knees.  Eye: Equal, round and reactive, conjunctiva clear  ENMT: Lips without lesions, good dentition, oropharynx clear    Neck: Trachea midline, no masses, no thyromegaly  Respiratory: Unlabored respiration, lungs clear to auscultation, no wheezes, no rhonchi  Cardiovascular: Normal rate and rhythm  Psych: Alert, pleasant, well-groomed, normal affect  Monofilament testing with a 10 gram force: sensation intact in 4/4 bilaterally.   Visual Inspection: Feet without maceration, ulcers, fissures.         A/P:  1. Flexural eczema  -Trial of triamcinolone ointment twice daily for 2 weeks. Discussed the importance of avoiding dry soaps. Discussed emollient moisturizers. Left me know in 2 weeks if not improving.  - triamcinolone acetonide (KENALOG) 0.1 % Ointment; Apply 1 Application to affected area(s) 2 times a day.  Dispense: 80 g; Refill: 2    2. Moderate episode of recurrent major depressive disorder (CMS-[facility])  -Recommend continuation of seeing her therapist. Encouraged her to continue exercise. She is looking for a job so that she may be more independent. She'll increase her Wellbutrin to 300 mg daily and follow-up here in 3 months.  - buPROPion (WELLBUTRIN XL) 300 MG XL tablet; Take 1 Tab by mouth every morning.  Dispense: 30 Tab; Refill: 5    3. Controlled type 2 diabetes mellitus without complication, without long-term current use of insulin (CMS-[facility])  -Well-controlled with a great A1c.  Discussed Eye exam once yearly -   - Discussed Dental exam once yearly -   - Discussed vaccinations: Yearly flu vaccine, Pneumovax, and hepatitis B vaccine -   - Discussed at minimum checking BS bid  - Discussed A1C target below 7.5%  - Discussed CMP and A1C evaluation every 3 to 6 months  - Discussed Lipid and spot urine albumin to Cr ratio once per year at minimum    -  Diabetic Monofilament Lower Extremity Exam    4. Morbid obesity, unspecified obesity type (CMS-HCC)  -She is working hard on this with diet and exercise. Follow-up 3 months.      In terms of healthcare maintenance, we were out of pneumococcal 23. She was given an updated tetanus vaccine and her first hepatitis B vaccine today. She'll follow-up here in 3 months.    5. Need for influenza vaccination  I have placed the below orders and discussed them with Dr. Perez. the MA is performing the below orders under the direction of Dr. NORIEGA  - INFLUENZA VACCINE QUAD INJ >3Y(PF)    6. Need for Tdap vaccination  I have placed the below orders and discussed them with Dr. Perez. the MA is performing the below orders under the direction of Dr. NORIEGA  - Tdap =>8yo IM

## 2017-11-15 ENCOUNTER — TELEPHONE (OUTPATIENT)
Dept: MEDICAL GROUP | Facility: LAB | Age: 47
End: 2017-11-15

## 2017-11-15 NOTE — TELEPHONE ENCOUNTER
Patient called today and states that she woke up today at 6 am feeling fine and then slept until 10:30 and woke up feeling confused, stuffed up nose, little energy and is wondering if this could be because of the Vaccines given yesterday

## 2017-11-16 RX ORDER — CYCLOBENZAPRINE HCL 10 MG
TABLET ORAL
Qty: 60 TAB | Refills: 0 | Status: SHIPPED | OUTPATIENT
Start: 2017-11-16 | End: 2018-02-20 | Stop reason: SDUPTHER

## 2017-12-27 ENCOUNTER — TELEPHONE (OUTPATIENT)
Dept: MEDICAL GROUP | Facility: LAB | Age: 47
End: 2017-12-27

## 2017-12-27 DIAGNOSIS — Z23 IMMUNIZATION DUE: ICD-10-CM

## 2017-12-27 NOTE — TELEPHONE ENCOUNTER
1. Caller Name: Bella Garibay                                           Call Back Number: [phone] (home)         Patient approves a detailed voicemail message: N\A    Patient is on the MA Schedule tomorrow for Hep B vaccine/injection.    SPECIFIC Action To Be Taken: Orders pending, please sign.

## 2018-01-24 ENCOUNTER — OFFICE VISIT (OUTPATIENT)
Dept: MEDICAL GROUP | Facility: LAB | Age: 48
End: 2018-01-24
Payer: COMMERCIAL

## 2018-01-24 VITALS
BODY MASS INDEX: 41.95 KG/M2 | HEART RATE: 87 BPM | OXYGEN SATURATION: 90 % | DIASTOLIC BLOOD PRESSURE: 68 MMHG | WEIGHT: 261 LBS | SYSTOLIC BLOOD PRESSURE: 92 MMHG | TEMPERATURE: 97.7 F | RESPIRATION RATE: 12 BRPM | HEIGHT: 66 IN

## 2018-01-24 DIAGNOSIS — M54.50 CHRONIC BILATERAL LOW BACK PAIN WITHOUT SCIATICA: ICD-10-CM

## 2018-01-24 DIAGNOSIS — G89.29 CHRONIC BILATERAL LOW BACK PAIN WITHOUT SCIATICA: ICD-10-CM

## 2018-01-24 DIAGNOSIS — F51.01 PRIMARY INSOMNIA: ICD-10-CM

## 2018-01-24 DIAGNOSIS — E66.01 MORBID OBESITY WITH BMI OF 40.0-44.9, ADULT (HCC): ICD-10-CM

## 2018-01-24 DIAGNOSIS — F33.41 RECURRENT MAJOR DEPRESSIVE DISORDER, IN PARTIAL REMISSION (HCC): ICD-10-CM

## 2018-01-24 DIAGNOSIS — Z23 NEED FOR HEPATITIS B VACCINATION: ICD-10-CM

## 2018-01-24 DIAGNOSIS — N62 LARGE BREASTS: ICD-10-CM

## 2018-01-24 PROCEDURE — 99214 OFFICE O/P EST MOD 30 MIN: CPT | Mod: 25 | Performed by: NURSE PRACTITIONER

## 2018-01-24 PROCEDURE — 90471 IMMUNIZATION ADMIN: CPT | Performed by: NURSE PRACTITIONER

## 2018-01-24 PROCEDURE — 90746 HEPB VACCINE 3 DOSE ADULT IM: CPT | Performed by: NURSE PRACTITIONER

## 2018-01-24 RX ORDER — AMITRIPTYLINE HYDROCHLORIDE 50 MG/1
TABLET, FILM COATED ORAL
Qty: 90 TAB | Refills: 3 | Status: SHIPPED | OUTPATIENT
Start: 2018-01-24 | End: 2019-04-08 | Stop reason: SDUPTHER

## 2018-01-24 NOTE — PROGRESS NOTES
"Chief Complaint   Patient presents with   • Weight Loss     discuss weight loss   • Immunizations     2nd Hep B        HPI  Tammie is a 47-year-old established febrile here with a few things:  #1-insomnia: Chronic issue. Requesting a refill of amitriptyline 50 mg. She alternates 50 mg with 150 mg, depending on the degree in which she feels like she'll have difficulty falling asleep. If she takes 150 mg of amitriptyline she does have a morning hangover. She has been taking amitriptyline for over a decade, has tried to come off of that because of the weight gain properties and has not done well on multiple other sleeping medications.  #2-chronic low back pain: She would like advice on whether or not she should have a breast reduction to potentially help with her obesity issue as well as her chronic low back pain. She suffers from chronic pain in her low back, in between her shoulder blades, both shoulders and has a lot of discomfort with the heaviness of her breast tissue, causing an indentation in her shoulders from her bra straps. She's decided not to move forward with a gastric sleeve due to the potential surgical dangers. She is working hard to lose weight through a ketotic diet as well as portion control and exercise.  #3-depression and anxiety: Much improved with 300 mg of Wellbutrin XL. Denies any negative side effects of Wellbutrin. Even reports that her relationship with her  has improved. She is seeing a therapist. Denies suicidal or homicidal ideations.      Past medical, surgical, family, and social history is reviewed and updated in Epic chart by me today.   Medications and allergies reviewed and updated in Epic chart by me today.     ROS:   As documented in history of present illness above    Exam:  Blood pressure (!) 92/68, pulse 87, temperature 36.5 °C (97.7 °F), resp. rate 12, height 1.676 m (5' 5.98\"), weight 118.4 kg (261 lb), SpO2 90 %.  Gen. Obese female, appears healthy in no distress "   Skin appropriate for sex and age   HEENT unremarkable   Neck no adenopathy, no nodules or masses palpable   Lungs clear   Heart regular   Neuro gait and station normal   Psych appropriate, calm and interactive.       A/P:  1. Primary insomnia  -chronic and stable. Reminded her of side effects.   - amitriptyline (ELAVIL) 50 MG Tab; TAKE ONE TO TWO TABLETS BY MOUTH AT BEDTIME AS NEEDED FOR SLEEP  Dispense: 90 Tab; Refill: 3    2. Recurrent major depressive disorder, in partial remission (CMS-HCC)  -improved.  Continue Wellbutrin. Continue efforts at physical activity. Continue with her therapist.    3. Chronic bilateral low back pain without sciatica  -Certainly agree with the patient that she would benefit from a breast reduction to help reduce her chronic back pain, improve her posture and improve her overall quality of life.    4. Large breasts  -She plans on meeting with Dr. Anthony for this and does not need a referral as far as she is aware.

## 2018-01-27 ENCOUNTER — HOSPITAL ENCOUNTER (OUTPATIENT)
Facility: MEDICAL CENTER | Age: 48
End: 2018-01-27
Attending: NURSE PRACTITIONER
Payer: COMMERCIAL

## 2018-01-27 ENCOUNTER — OFFICE VISIT (OUTPATIENT)
Dept: URGENT CARE | Facility: CLINIC | Age: 48
End: 2018-01-27
Payer: COMMERCIAL

## 2018-01-27 VITALS
BODY MASS INDEX: 45.65 KG/M2 | HEIGHT: 65 IN | OXYGEN SATURATION: 96 % | SYSTOLIC BLOOD PRESSURE: 124 MMHG | WEIGHT: 274 LBS | TEMPERATURE: 97.4 F | RESPIRATION RATE: 16 BRPM | HEART RATE: 86 BPM | DIASTOLIC BLOOD PRESSURE: 82 MMHG

## 2018-01-27 DIAGNOSIS — N89.8 VAGINAL LESION: ICD-10-CM

## 2018-01-27 DIAGNOSIS — R10.2 VAGINAL PAIN: ICD-10-CM

## 2018-01-27 DIAGNOSIS — N89.8 VAGINAL DISCHARGE: ICD-10-CM

## 2018-01-27 PROCEDURE — 87491 CHLMYD TRACH DNA AMP PROBE: CPT

## 2018-01-27 PROCEDURE — 87510 GARDNER VAG DNA DIR PROBE: CPT

## 2018-01-27 PROCEDURE — 87591 N.GONORRHOEAE DNA AMP PROB: CPT

## 2018-01-27 PROCEDURE — 99000 SPECIMEN HANDLING OFFICE-LAB: CPT | Performed by: NURSE PRACTITIONER

## 2018-01-27 PROCEDURE — 87480 CANDIDA DNA DIR PROBE: CPT

## 2018-01-27 PROCEDURE — 87660 TRICHOMONAS VAGIN DIR PROBE: CPT

## 2018-01-27 PROCEDURE — 99214 OFFICE O/P EST MOD 30 MIN: CPT | Performed by: NURSE PRACTITIONER

## 2018-01-27 PROCEDURE — 87529 HSV DNA AMP PROBE: CPT | Mod: 91

## 2018-01-27 RX ORDER — CYANOCOBALAMIN 1000 UG/ML
1000 INJECTION, SOLUTION INTRAMUSCULAR; SUBCUTANEOUS
COMMUNITY
Start: 2017-11-30 | End: 2022-08-25 | Stop reason: SDUPTHER

## 2018-01-27 RX ORDER — FLUCONAZOLE 150 MG/1
150 TABLET ORAL DAILY
Qty: 1 TAB | Refills: 0 | Status: SHIPPED | OUTPATIENT
Start: 2018-01-27 | End: 2018-08-31

## 2018-01-27 RX ORDER — BUPROPION HYDROCHLORIDE 150 MG/1
TABLET ORAL
COMMUNITY
Start: 2017-12-11 | End: 2018-03-20 | Stop reason: SDUPTHER

## 2018-01-27 ASSESSMENT — ENCOUNTER SYMPTOMS
GASTROINTESTINAL NEGATIVE: 1
PSYCHIATRIC NEGATIVE: 1
NAUSEA: 0
CONSTITUTIONAL NEGATIVE: 1
CARDIOVASCULAR NEGATIVE: 1
ABDOMINAL PAIN: 0
NEUROLOGICAL NEGATIVE: 1

## 2018-01-27 NOTE — PROGRESS NOTES
Subjective:      Bella Garibay is a 47 y.o. female who presents with Vaginal Pain (x 1 wk, vaginal pain, swelling, itchy, discharge and possible rash)        HPI    The patient is here today with complaint of vaginal pain. States that one week ago she noticed pain to her perineum. Since then her pain has worsened. She denies any injury or trauma. Area is painful, itchy, with scant amount of drainage. She denies associated fever, chills, abdominal pain, or changes in her urination. She is , believes that her and her spouse are in a monogamous relationship. She denies history of STDs. She has tried over-the-counter Monistat and topical wipes for symptom relief without improvement.    Past Medical History:   Diagnosis Date   • Bronchitis 6/17/2014   • Early menopause     @ 39   • Hypothyroid 7/24/2009   • Obesity 7/24/2009     Past Surgical History:   Procedure Laterality Date   • PRIMARY C SECTION       Current Outpatient Prescriptions on File Prior to Visit   Medication Sig Dispense Refill   • amitriptyline (ELAVIL) 50 MG Tab TAKE ONE TO TWO TABLETS BY MOUTH AT BEDTIME AS NEEDED FOR SLEEP 90 Tab 3   • cyclobenzaprine (FLEXERIL) 10 MG Tab TAKE ONE TABLET BY MOUTH TWICE DAILY AS NEEDED 60 Tab 0   • triamcinolone acetonide (KENALOG) 0.1 % Ointment Apply 1 Application to affected area(s) 2 times a day. 80 g 2   • buPROPion (WELLBUTRIN XL) 300 MG XL tablet Take 1 Tab by mouth every morning. 30 Tab 5   • amitriptyline (ELAVIL) 150 MG Tab TAKE ONE TABLET BY MOUTH AT BEDTIME AS NEEDED FOR SLEEP 90 Tab 3   • acetaminophen-codeine #3 (TYLENOL #3) 300-30 MG Tab TAKE ONE TO TWO TABLETS BY MOUTH AT BEDTIME AS NEEDED FOR PAIN 45 Tab 0   • NON SPECIFIED Hernia belt    Dx:   Right abdominal wall hernia 1 Each 0   • levothyroxine (SYNTHROID) 175 MCG Tab TAKE ONE TABLET BY MOUTH ONCE DAILY 90 Tab 1   • Empagliflozin (JARDIANCE) 25 MG Tab Take 1 Tab by mouth every day. 30 Tab 4   • fluticasone (FLONASE) 50 MCG/ACT nasal  "spray Spray 2 Sprays in nose every day. 1 Bottle 3   • nabumetone (RELAFEN) 500 MG Tab Take 1 Tab by mouth 2 times a day. With food in place of ibuprofen for knee inflammation 60 Tab 0   • ibuprofen (MOTRIN) 800 MG Tab Take 1 Tab by mouth every 8 hours as needed for Mild Pain. 60 Tab 2   • Somatropin (OMNITROPE SC) Inject 0.08 Each as instructed every day.     • Somatropin (GENOTROPIN) 0.2 MG SOLR Inject 4 mg as instructed. 116 Each    • Garcinia Cambogia-Chromium 500-200 MG-MCG TABS Take  by mouth.       No current facility-administered medications on file prior to visit.      Nkda [no known drug allergy]    Review of Systems   Constitutional: Negative.    Cardiovascular: Negative.    Gastrointestinal: Negative.  Negative for abdominal pain and nausea.   Genitourinary: Negative.    Skin: Positive for itching.        Painful perineum   Neurological: Negative.    Endo/Heme/Allergies: Negative.    Psychiatric/Behavioral: Negative.           Objective:     /82   Pulse 86   Temp 36.3 °C (97.4 °F)   Resp 16   Ht 1.651 m (5' 5\")   Wt 124.3 kg (274 lb)   SpO2 96%   BMI 45.60 kg/m²      Physical Exam   Constitutional: She is oriented to person, place, and time. Vital signs are normal. She appears well-developed and well-nourished. She is active. She does not have a sickly appearance. She does not appear ill. No distress.   HENT:   Head: Normocephalic and atraumatic.   Right Ear: External ear normal.   Left Ear: External ear normal.   Nose: Nose normal.   Mouth/Throat: Oropharynx is clear and moist.   Eyes: Conjunctivae are normal. Pupils are equal, round, and reactive to light. Right eye exhibits no discharge. Left eye exhibits no discharge. No scleral icterus.   Neck: Normal range of motion. Neck supple. No JVD present.   Cardiovascular: Normal rate, regular rhythm, normal heart sounds and intact distal pulses.    Pulmonary/Chest: Effort normal and breath sounds normal. No stridor. No respiratory distress. "   Abdominal: Soft. She exhibits no distension. There is no tenderness.   Genitourinary:       Pelvic exam was performed with patient supine.   Musculoskeletal: Normal range of motion. She exhibits no edema, tenderness or deformity.   Lymphadenopathy:     She has no cervical adenopathy.   Neurological: She is alert and oriented to person, place, and time. She has normal strength. No cranial nerve deficit or sensory deficit. She exhibits normal muscle tone. Coordination and gait normal. GCS eye subscore is 4. GCS verbal subscore is 5. GCS motor subscore is 6.   Skin: Skin is warm. Capillary refill takes less than 2 seconds. No abrasion, no bruising, no ecchymosis and no laceration noted. She is not diaphoretic. No pallor.   Psychiatric: She has a normal mood and affect. Her behavior is normal. Judgment and thought content normal.   Vitals reviewed.              Assessment/Plan:     1. Vaginal pain  VAGINAL PATHOGENS DNA PANEL    CHLAMYDIA/GC, DNA PROBE    HERPES SCREEN VIRAL CULTURE    fluconazole (DIFLUCAN) 150 MG tablet    lidocaine (XYLOCAINE) 2 % Gel   2. Vaginal lesion  VAGINAL PATHOGENS DNA PANEL    CHLAMYDIA/GC, DNA PROBE    HERPES SCREEN VIRAL CULTURE   3. Vaginal discharge  VAGINAL PATHOGENS DNA PANEL    CHLAMYDIA/GC, DNA PROBE    HERPES SCREEN VIRAL CULTURE    fluconazole (DIFLUCAN) 150 MG tablet       - fluconazole (DIFLUCAN) 150 MG tablet; Take 1 Tab by mouth every day.  Dispense: 1 Tab; Refill: 0  - lidocaine (XYLOCAINE) 2 % Gel; Apply 1 Application to affected area(s) 3 times a day as needed.  Dispense: 1 Bottle; Refill: 0      Will treat for suspected candida with one dose of diflucan. Topical lidocaine for pain relief. Warm sitz baths, ice, cold compresses. Vaginal pathogens, gonorrhea/chlamydia, and HSV sent for testing.  Supportive care, differential diagnoses, and indications for immediate follow-up discussed with patient.   Pathogenesis of diagnosis discussed including typical length and natural  progression.   Instructed to return to clinic or nearest emergency department for any change in condition, further concerns, or worsening of symptoms.  Patient states understanding of the plan of care and discharge instructions.  Instructed to make an appointment, for follow up, with her primary care provider.        MATTY Gutierrez.

## 2018-01-28 DIAGNOSIS — R10.2 VAGINAL PAIN: ICD-10-CM

## 2018-01-28 DIAGNOSIS — N89.8 VAGINAL LESION: ICD-10-CM

## 2018-01-28 DIAGNOSIS — N89.8 VAGINAL DISCHARGE: ICD-10-CM

## 2018-01-28 LAB
CANDIDA DNA VAG QL PROBE+SIG AMP: NEGATIVE
G VAGINALIS DNA VAG QL PROBE+SIG AMP: NEGATIVE
HSV1+2 DNA SPEC QL NAA+PROBE: ABNORMAL
SIGNIFICANT IND 70042: ABNORMAL
SITE SITE: ABNORMAL
SOURCE SOURCE: ABNORMAL
T VAGINALIS DNA VAG QL PROBE+SIG AMP: NEGATIVE

## 2018-01-30 DIAGNOSIS — B00.9 HSV-2 (HERPES SIMPLEX VIRUS 2) INFECTION: ICD-10-CM

## 2018-01-30 LAB
C TRACH DNA SPEC QL NAA+PROBE: NEGATIVE
N GONORRHOEA DNA SPEC QL NAA+PROBE: NEGATIVE
SPECIMEN SOURCE: NORMAL

## 2018-01-30 RX ORDER — FAMCICLOVIR 250 MG/1
250 TABLET ORAL 3 TIMES DAILY
Qty: 30 TAB | Refills: 0 | Status: SHIPPED | OUTPATIENT
Start: 2018-01-30 | End: 2018-02-09

## 2018-02-20 DIAGNOSIS — M54.50 CHRONIC BILATERAL LOW BACK PAIN WITHOUT SCIATICA: ICD-10-CM

## 2018-02-20 DIAGNOSIS — G89.29 CHRONIC BILATERAL LOW BACK PAIN WITHOUT SCIATICA: ICD-10-CM

## 2018-02-20 RX ORDER — CYCLOBENZAPRINE HCL 10 MG
TABLET ORAL
Qty: 60 TAB | Refills: 0 | Status: SHIPPED | OUTPATIENT
Start: 2018-02-20 | End: 2018-04-15 | Stop reason: SDUPTHER

## 2018-02-20 NOTE — TELEPHONE ENCOUNTER
Was the patient seen in the last year in this department? Yes     Does patient have an active prescription for medications requested? No     Received Request Via: Pharmacy     Per  last fill: 10-17-17 # [id number]

## 2018-03-14 RX ORDER — LEVOTHYROXINE SODIUM 175 UG/1
TABLET ORAL
Qty: 90 TAB | Refills: 1 | Status: SHIPPED | OUTPATIENT
Start: 2018-03-14 | End: 2018-09-03 | Stop reason: SDUPTHER

## 2018-03-20 RX ORDER — BUPROPION HYDROCHLORIDE 150 MG/1
150 TABLET ORAL EVERY MORNING
Qty: 30 TAB | Refills: 6 | Status: SHIPPED | OUTPATIENT
Start: 2018-03-20 | End: 2018-11-04 | Stop reason: SDUPTHER

## 2018-04-16 DIAGNOSIS — F41.9 ANXIETY: ICD-10-CM

## 2018-04-16 RX ORDER — ALPRAZOLAM 1 MG/1
TABLET ORAL
Qty: 90 TAB | Refills: 3 | Status: SHIPPED
Start: 2018-04-16 | End: 2018-05-16

## 2018-04-16 RX ORDER — CYCLOBENZAPRINE HCL 10 MG
TABLET ORAL
Qty: 60 TAB | Refills: 0 | Status: SHIPPED | OUTPATIENT
Start: 2018-04-16 | End: 2019-07-07

## 2018-04-16 NOTE — TELEPHONE ENCOUNTER
From: Bella Garibay  Sent: 4/15/2018 1:06 PM PDT  Subject: Medication Renewal Request    Bella Garibay would like a refill of the following medications:     cyclobenzaprine (FLEXERIL) 10 MG Tab [Reny Dalton, A.P.N.]    Preferred pharmacy: [facility] [address] [facility] [place] - [address]    Comment:  This message is being sent by Ion Garibay on behalf of Bella Garibay

## 2018-04-16 NOTE — TELEPHONE ENCOUNTER
Was the patient seen in the last year in this department? Yes lov 1/24/18    Does patient have an active prescription for medications requested? No     Received Request Via: Pharmacy

## 2018-04-16 NOTE — TELEPHONE ENCOUNTER
Was the patient seen in the last year in this department? Yes  refill 3/13/18    Does patient have an active prescription for medications requested? No     Received Request Via: Patient

## 2018-05-10 ENCOUNTER — OFFICE VISIT (OUTPATIENT)
Dept: MEDICAL GROUP | Facility: LAB | Age: 48
End: 2018-05-10
Payer: COMMERCIAL

## 2018-05-10 VITALS
HEART RATE: 85 BPM | SYSTOLIC BLOOD PRESSURE: 100 MMHG | DIASTOLIC BLOOD PRESSURE: 76 MMHG | OXYGEN SATURATION: 94 % | HEIGHT: 65 IN | RESPIRATION RATE: 12 BRPM | TEMPERATURE: 97.5 F

## 2018-05-10 DIAGNOSIS — B00.9 HSV-2 INFECTION: ICD-10-CM

## 2018-05-10 PROCEDURE — 99213 OFFICE O/P EST LOW 20 MIN: CPT | Performed by: NURSE PRACTITIONER

## 2018-05-10 RX ORDER — ACYCLOVIR 400 MG/1
TABLET ORAL
Qty: 180 TAB | Refills: 3 | Status: SHIPPED | OUTPATIENT
Start: 2018-05-10 | End: 2019-07-27

## 2018-05-10 NOTE — PROGRESS NOTES
"Chief Complaint   Patient presents with   • Herpes Zoster     Dx of genital herpes        HPI  Aissatou is a 47-year-old established female here with a flare up of type II herpes. She complains of possibly 14 days of vaginal pain, swelling and sores to her labial areas. Her last outbreak was 4 months ago - dx UC. Her  also has type II herpes. She does not have any medication for type II herpes. He denies hematuria, dysuria, hematochezia, nausea, vomiting, diarrhea.      Past medical, surgical, family, and social history is reviewed and updated in Epic chart by me today.   Medications and allergies reviewed and updated in Epic chart by me today.     ROS:   As documented in history of present illness above    Exam:  Blood pressure 100/76, pulse 85, temperature 36.4 °C (97.5 °F), resp. rate 12, height 1.651 m (5' 5\"), SpO2 94 %.  Gen. appears healthy in no distress   Skin appropriate for sex and age   HEENT unremarkable   Neck no adenopathy, no nodules or masses palpable   Lungs clear   Heart regular   Abdomen: Soft and nontender.  : She declines a pelvic exam.  Psych appropriate      A/P:  1. HSV-2 infection  -We'll start her on acyclovir 3 times a day for 7 days, then twice daily for maintenance therapy. Recommend a follow-up here by Monday if symptoms are not improving.  - acyclovir (ZOVIRAX) 400 MG tablet; Take 3 x daily for 7 days for outbreak and then continue twice daily for maintenace therapy  Dispense: 180 Tab; Refill: 3    "

## 2018-05-16 ENCOUNTER — OFFICE VISIT (OUTPATIENT)
Dept: MEDICAL GROUP | Facility: LAB | Age: 48
End: 2018-05-16
Payer: COMMERCIAL

## 2018-05-16 DIAGNOSIS — R10.2 PELVIC PAIN: ICD-10-CM

## 2018-05-16 PROCEDURE — 99213 OFFICE O/P EST LOW 20 MIN: CPT | Performed by: NURSE PRACTITIONER

## 2018-05-16 NOTE — PROGRESS NOTES
CC  Pelvic pain    HPI  Bella is a 48 yo est female here with complaint of persistent pelvic/vaginal pain. She describes the pain as a swollen sensation, stating that she feels like her uterus is swollen. She also feels like she has to push something back in her vaginal area when she has a bowel movement. She was concerned initially that this was a flareup of nerve irritation from HSV 2 (dx 1/2018) but when she took acyclovir for a full week her symptoms did not improve at all. Her pelvic pain has been present now for 3 weeks.   She has not been sexually active in the past 3 weeks and is therefore uncertain regarding dyspareunia. She has not had any vaginal bleeding since she was 32 years old, she went through premature menopause in her early 30s after she had 2 sets of twins.  Last pap 5/2017 and normal.     Past medical, surgical, family, and social history is reviewed and updated in Epic chart by me today.   Medications and allergies reviewed and updated in Epic chart by me today.     ROS:   As documented in history of present illness above    Exam:  Constitutional: Alert, no distress, plus 3 vital signs  Skin:  Warm, dry, no rashes invisible areas  Eye: Equal, round and reactive, conjunctiva clear  Neck: Trachea midline  Cardiovascular: Normal rate  Abdomen: Soft with suprapubic tenderness mainly left sided  :  No abnormalities visualized to labial regions such as ulcerations.  She did experience reported tenderness with insertion of speculum and withdrawal.  Cervix visualized easily without ulceration or growth.  No cervical motion tenderness.  + left sided adnexal tenderness.    Psych: Alert, pleasant, well-groomed, normal affect    A/P:  1. Pelvic pain  -recommend urgent transvaginal US and consult with gyn after US  - US-GYN-PELVIS TRANSVAGINAL; Future  - REFERRAL TO GYNECOLOGY

## 2018-05-17 ENCOUNTER — HOSPITAL ENCOUNTER (OUTPATIENT)
Dept: RADIOLOGY | Facility: MEDICAL CENTER | Age: 48
End: 2018-05-17
Attending: NURSE PRACTITIONER
Payer: COMMERCIAL

## 2018-05-17 DIAGNOSIS — R10.2 PELVIC PAIN: ICD-10-CM

## 2018-05-17 PROCEDURE — 76830 TRANSVAGINAL US NON-OB: CPT

## 2018-07-31 ENCOUNTER — TELEPHONE (OUTPATIENT)
Dept: HEALTH INFORMATION MANAGEMENT | Facility: OTHER | Age: 48
End: 2018-07-31

## 2018-07-31 ENCOUNTER — PATIENT OUTREACH (OUTPATIENT)
Dept: HEALTH INFORMATION MANAGEMENT | Facility: OTHER | Age: 48
End: 2018-07-31

## 2018-07-31 DIAGNOSIS — E11.9 CONTROLLED TYPE 2 DIABETES MELLITUS WITHOUT COMPLICATION, WITHOUT LONG-TERM CURRENT USE OF INSULIN (HCC): ICD-10-CM

## 2018-07-31 DIAGNOSIS — Z12.11 SCREENING FOR COLON CANCER: ICD-10-CM

## 2018-07-31 DIAGNOSIS — E03.4 HYPOTHYROIDISM DUE TO ACQUIRED ATROPHY OF THYROID: ICD-10-CM

## 2018-07-31 NOTE — PROGRESS NOTES
Attempt #:1    WebIZ Checked & Epic Updated: Yes  HealthConnect Verified: yes  Verify PCP: yes    Communication Preference Obtained: no    Diabetes Visit Scheduling  Scheduling Status:Not Scheduled. Patient states they are not interested      Care Gap Scheduling (Attempt to Schedule EACH Overdue Care Gap!)    Health Maintenance Due   Topic Date Due   • RETINAL SCREENING  08/06/1987   • URINE ACR / MICROALBUMIN  08/06/1987   • IMM PNEUMOCOCCAL 19-64 (ADULT) MEDIUM RISK SERIES (1 of 1 - PPSV23) 08/06/1988   • A1C SCREENING  03/14/2018   • IMM HEP B VACCINE (3 of 3 - Risk 3-dose series) 03/21/2018   • SERUM CREATININE  05/08/2018   • MAMMOGRAM  06/20/2018        Patient was directed to Health and Wellness Website: no     - Patient plans to schedule appointment for Colonoscopy/FIT.    Screened for [employer] Prescription? no  AntVoicet Activation: already active  [employer] Aster: no  Virtual Visits: no  Opt In to Text Messages: no

## 2018-07-31 NOTE — TELEPHONE ENCOUNTER
Good Afternoon,  Bella is requesting lab orders be placed in her chart thank you and have a good day .

## 2018-08-01 ENCOUNTER — PATIENT MESSAGE (OUTPATIENT)
Dept: HEALTH INFORMATION MANAGEMENT | Facility: OTHER | Age: 48
End: 2018-08-01

## 2018-08-10 ENCOUNTER — HOSPITAL ENCOUNTER (OUTPATIENT)
Dept: RADIOLOGY | Facility: MEDICAL CENTER | Age: 48
End: 2018-08-10
Attending: NURSE PRACTITIONER
Payer: COMMERCIAL

## 2018-08-10 DIAGNOSIS — Z12.31 BREAST CANCER SCREENING BY MAMMOGRAM: ICD-10-CM

## 2018-08-10 PROCEDURE — 77067 SCR MAMMO BI INCL CAD: CPT

## 2018-08-15 ENCOUNTER — OFFICE VISIT (OUTPATIENT)
Dept: URGENT CARE | Facility: CLINIC | Age: 48
End: 2018-08-15
Payer: COMMERCIAL

## 2018-08-15 VITALS
SYSTOLIC BLOOD PRESSURE: 110 MMHG | BODY MASS INDEX: 44.65 KG/M2 | DIASTOLIC BLOOD PRESSURE: 70 MMHG | RESPIRATION RATE: 18 BRPM | OXYGEN SATURATION: 94 % | WEIGHT: 268 LBS | HEIGHT: 65 IN | HEART RATE: 88 BPM | TEMPERATURE: 96.9 F

## 2018-08-15 DIAGNOSIS — S16.1XXA NECK STRAIN, INITIAL ENCOUNTER: ICD-10-CM

## 2018-08-15 DIAGNOSIS — R21 RASH: ICD-10-CM

## 2018-08-15 PROCEDURE — 99214 OFFICE O/P EST MOD 30 MIN: CPT | Performed by: PHYSICIAN ASSISTANT

## 2018-08-15 RX ORDER — METHYLPREDNISOLONE SODIUM SUCCINATE 125 MG/2ML
125 INJECTION, POWDER, LYOPHILIZED, FOR SOLUTION INTRAMUSCULAR; INTRAVENOUS ONCE
Status: COMPLETED | OUTPATIENT
Start: 2018-08-15 | End: 2018-08-15

## 2018-08-15 RX ORDER — METHYLPREDNISOLONE 4 MG/1
TABLET ORAL
Qty: 1 TAB | Refills: 0 | Status: SHIPPED | OUTPATIENT
Start: 2018-08-15 | End: 2018-08-31

## 2018-08-15 RX ORDER — BENZOCAINE/MENTHOL 6 MG-10 MG
LOZENGE MUCOUS MEMBRANE 2 TIMES DAILY
COMMUNITY
End: 2018-08-31

## 2018-08-15 RX ADMIN — METHYLPREDNISOLONE SODIUM SUCCINATE 125 MG: 125 INJECTION, POWDER, LYOPHILIZED, FOR SOLUTION INTRAMUSCULAR; INTRAVENOUS at 19:40

## 2018-08-15 ASSESSMENT — ENCOUNTER SYMPTOMS
CONSTITUTIONAL NEGATIVE: 1
TINGLING: 0
NECK PAIN: 1
HEADACHES: 0
WEAKNESS: 0
EYES NEGATIVE: 1
NUMBNESS: 0
NEUROLOGICAL NEGATIVE: 1

## 2018-08-16 NOTE — PROGRESS NOTES
"Subjective:      Bella Garibay is a 48 y.o. female who presents with Poison Ivy (x6days. neck area, legs, and arms, itchy, painful, redness, swelling) and Neck Pain (x3days, right neck pain, pinched nerve)            Poison Ivy   This is a new problem. The current episode started in the past 7 days. The problem is unchanged. The rash is diffuse. The rash is characterized by redness and itchiness. Pertinent negatives include no facial edema or joint pain. Past treatments include nothing. The treatment provided no relief. There is no history of allergies.   Neck Pain    This is a new problem. The current episode started yesterday. The problem occurs constantly. The problem has been unchanged. The pain is associated with nothing. The quality of the pain is described as aching. The pain is moderate. Stiffness is present all day. Pertinent negatives include no headaches, numbness, tingling or weakness. She has tried nothing for the symptoms. The treatment provided no relief.       Review of Systems   Constitutional: Negative.    HENT: Negative.    Eyes: Negative.    Musculoskeletal: Positive for neck pain. Negative for joint pain.   Skin: Negative.    Neurological: Negative.  Negative for tingling, weakness, numbness and headaches.          Objective:     /70   Pulse 88   Temp 36.1 °C (96.9 °F)   Resp 18   Ht 1.651 m (5' 5\")   Wt 121.6 kg (268 lb)   SpO2 94%   BMI 44.60 kg/m²      Physical Exam   Constitutional: She is oriented to person, place, and time. She appears well-developed and well-nourished. No distress.   HENT:   Head: Normocephalic and atraumatic.   Eyes: Pupils are equal, round, and reactive to light. EOM are normal.   Neck: Neck supple. Muscular tenderness present. Decreased range of motion present.       Cardiovascular: Normal rate.    Musculoskeletal: She exhibits tenderness. She exhibits no edema.   Neurological: She is alert and oriented to person, place, and time. No sensory " deficit. She exhibits normal muscle tone. Coordination normal.   Skin: Skin is warm and dry.   Psychiatric: She has a normal mood and affect. Her behavior is normal.   Nursing note and vitals reviewed.    Active Ambulatory Problems     Diagnosis Date Noted   • Obesity 07/24/2009   • Anxiety 09/06/2011   • Insomnia 03/20/2014   • Hypothyroidism due to acquired atrophy of thyroid 11/06/2015   • Right knee pain 06/16/2016   • Morbid obesity with BMI of 40.0-44.9, adult (Formerly [facility]) 08/15/2017   • Controlled type 2 diabetes mellitus without complication (Formerly [facility]) 08/24/2017   • Chronic bilateral low back pain with right-sided sciatica 08/24/2017     Resolved Ambulatory Problems     Diagnosis Date Noted   • Hypothyroid 07/24/2009   • Chronic back pain 05/02/2011   • Bronchitis 06/17/2014   • Urine white blood cells increased 02/09/2015   • Morbid obesity (Formerly [facility]) 05/17/2017     Past Medical History:   Diagnosis Date   • Bronchitis 6/17/2014   • Early menopause    • Hypothyroid 7/24/2009   • Obesity 7/24/2009     Current Outpatient Prescriptions on File Prior to Visit   Medication Sig Dispense Refill   • ALPRAZolam (XANAX) 1 MG Tab TAKE 1/2 TO 1 (ONE-HALF TO ONE) TABLET BY MOUTH THREE TIMES DAILY AS NEEDED FOR ANXIETY *F41.9* 90 Tab 3   • acyclovir (ZOVIRAX) 400 MG tablet Take 3 x daily for 7 days for outbreak and then continue twice daily for maintenace therapy 180 Tab 3   • cyclobenzaprine (FLEXERIL) 10 MG Tab TAKE 1 TABLET BY MOUTH TWICE DAILY AS NEEDED 180 Tab 0   • cyclobenzaprine (FLEXERIL) 10 MG Tab TAKE ONE TABLET BY MOUTH TWICE DAILY AS NEEDED 60 Tab 0   • buPROPion (WELLBUTRIN XL) 150 MG XL tablet Take 1 Tab by mouth every morning. 30 Tab 6   • levothyroxine (SYNTHROID) 175 MCG Tab TAKE ONE TABLET BY MOUTH ONCE DAILY 90 Tab 1   • cyanocobalamin (VITAMIN B-12) 1000 MCG/ML Solution      • fluconazole (DIFLUCAN) 150 MG tablet Take 1 Tab by mouth every day. 1 Tab 0   • lidocaine (XYLOCAINE) 2 % Gel Apply 1 Application to affected  area(s) 3 times a day as needed. 1 Bottle 0   • amitriptyline (ELAVIL) 50 MG Tab TAKE ONE TO TWO TABLETS BY MOUTH AT BEDTIME AS NEEDED FOR SLEEP 90 Tab 3   • triamcinolone acetonide (KENALOG) 0.1 % Ointment Apply 1 Application to affected area(s) 2 times a day. 80 g 2   • buPROPion (WELLBUTRIN XL) 300 MG XL tablet Take 1 Tab by mouth every morning. 30 Tab 5   • amitriptyline (ELAVIL) 150 MG Tab TAKE ONE TABLET BY MOUTH AT BEDTIME AS NEEDED FOR SLEEP 90 Tab 3   • NON SPECIFIED Hernia belt    Dx:   Right abdominal wall hernia 1 Each 0   • Empagliflozin (JARDIANCE) 25 MG Tab Take 1 Tab by mouth every day. 30 Tab 4   • fluticasone (FLONASE) 50 MCG/ACT nasal spray Spray 2 Sprays in nose every day. 1 Bottle 3   • nabumetone (RELAFEN) 500 MG Tab Take 1 Tab by mouth 2 times a day. With food in place of ibuprofen for knee inflammation 60 Tab 0   • ibuprofen (MOTRIN) 800 MG Tab Take 1 Tab by mouth every 8 hours as needed for Mild Pain. 60 Tab 2   • Somatropin (OMNITROPE SC) Inject 0.08 Each as instructed every day.     • Somatropin (GENOTROPIN) 0.2 MG SOLR Inject 4 mg as instructed. 116 Each    • Garcinia Cambogia-Chromium 500-200 MG-MCG TABS Take  by mouth.       No current facility-administered medications on file prior to visit.      Social History     Social History   • Marital status:      Spouse name: N/A   • Number of children: N/A   • Years of education: N/A     Occupational History   • Not on file.     Social History Main Topics   • Smoking status: Former Smoker     Packs/day: 1.00     Quit date: 2/20/2013   • Smokeless tobacco: Never Used   • Alcohol use Yes      Comment: occ   • Drug use: No   • Sexual activity: Not on file      Comment: , twins,      Other Topics Concern   • Not on file     Social History Narrative   • No narrative on file     Family History   Problem Relation Age of Onset   • Alcohol/Drug Mother         accidental overdose   • Cancer Mother         breast   • Cancer Maternal  Uncle         liver   • Cancer Maternal Aunt         breast   • Cancer Maternal Grandmother         breast     Nkda [no known drug allergy]              Assessment/Plan:     ·  [facility] [place] derm.  · Neck strain      · rx meds

## 2018-08-27 ENCOUNTER — TELEPHONE (OUTPATIENT)
Dept: MEDICAL GROUP | Facility: LAB | Age: 48
End: 2018-08-27

## 2018-08-27 ENCOUNTER — NON-PROVIDER VISIT (OUTPATIENT)
Dept: MEDICAL GROUP | Facility: LAB | Age: 48
End: 2018-08-27
Payer: COMMERCIAL

## 2018-08-27 DIAGNOSIS — R39.9 UTI SYMPTOMS: ICD-10-CM

## 2018-08-27 DIAGNOSIS — N39.0 ACUTE UTI: ICD-10-CM

## 2018-08-27 LAB
APPEARANCE UR: CLEAR
BILIRUB UR STRIP-MCNC: NEGATIVE MG/DL
COLOR UR AUTO: YELLOW
GLUCOSE UR STRIP.AUTO-MCNC: NORMAL MG/DL
KETONES UR STRIP.AUTO-MCNC: NEGATIVE MG/DL
LEUKOCYTE ESTERASE UR QL STRIP.AUTO: NORMAL
NITRITE UR QL STRIP.AUTO: NEGATIVE
PH UR STRIP.AUTO: 6.5 [PH] (ref 5–8)
PROT UR QL STRIP: NEGATIVE MG/DL
RBC UR QL AUTO: NORMAL
SP GR UR STRIP.AUTO: 1
UROBILINOGEN UR STRIP-MCNC: 0.2 MG/DL

## 2018-08-27 PROCEDURE — 81002 URINALYSIS NONAUTO W/O SCOPE: CPT | Performed by: NURSE PRACTITIONER

## 2018-08-27 RX ORDER — NITROFURANTOIN 25; 75 MG/1; MG/1
100 CAPSULE ORAL 2 TIMES DAILY
Qty: 14 CAP | Refills: 0 | Status: SHIPPED | OUTPATIENT
Start: 2018-08-27 | End: 2018-08-31

## 2018-08-27 NOTE — PROGRESS NOTES
Bella Fagan Phoenix is a 48 y.o. female here for a non-provider visit for URINALYSIS    If abnormal was an in office provider notified today (if so, indicate provider)?yes  Routed to PCP? No

## 2018-08-27 NOTE — TELEPHONE ENCOUNTER
Patient called and states that she believes she has a UTI that started two days ago. She states that she gets them all the time she would like to get medication to get rid of it. Informed her that I would see what we could do but if she can not wait to hear back from us to go to Urgent care. Patient would rather come here to give a urine sample today. Please adivse

## 2018-08-30 RX ORDER — LINACLOTIDE 145 UG/1
CAPSULE, GELATIN COATED ORAL
COMMUNITY
Start: 2018-08-08 | End: 2019-07-27

## 2018-08-31 ENCOUNTER — OFFICE VISIT (OUTPATIENT)
Dept: MEDICAL GROUP | Facility: LAB | Age: 48
End: 2018-08-31
Payer: COMMERCIAL

## 2018-08-31 VITALS
WEIGHT: 266.6 LBS | TEMPERATURE: 97.5 F | OXYGEN SATURATION: 95 % | HEART RATE: 80 BPM | RESPIRATION RATE: 16 BRPM | SYSTOLIC BLOOD PRESSURE: 112 MMHG | DIASTOLIC BLOOD PRESSURE: 72 MMHG | BODY MASS INDEX: 44.42 KG/M2 | HEIGHT: 65 IN

## 2018-08-31 DIAGNOSIS — K59.09 OTHER CONSTIPATION: ICD-10-CM

## 2018-08-31 DIAGNOSIS — B36.0 TINEA VERSICOLOR: ICD-10-CM

## 2018-08-31 PROBLEM — R21 SKIN RASH: Status: ACTIVE | Noted: 2018-08-31

## 2018-08-31 PROCEDURE — 99203 OFFICE O/P NEW LOW 30 MIN: CPT | Performed by: INTERNAL MEDICINE

## 2018-08-31 RX ORDER — SELENIUM SULFIDE 2.5 MG/100ML
LOTION TOPICAL
Qty: 1 BOTTLE | Refills: 1 | Status: SHIPPED | OUTPATIENT
Start: 2018-08-31 | End: 2019-07-27

## 2018-08-31 RX ORDER — SENNA AND DOCUSATE SODIUM 50; 8.6 MG/1; MG/1
1 TABLET, FILM COATED ORAL DAILY
Qty: 30 TAB | Refills: 6 | Status: SHIPPED | OUTPATIENT
Start: 2018-08-31 | End: 2019-04-08 | Stop reason: SDUPTHER

## 2018-08-31 NOTE — ASSESSMENT & PLAN NOTE
This is a new uncontrolled problem.  She reported couple of weeks history of a skin rash that started at the back of her neck area and then consequently spread to involve her back and bilateral upper arms area.  Described as a small papular, red, itchy areas.  She was seen at an urgent care facility initially, she was told that she has a poison ivy or poison AND treated with a Medrol pack.  She stated that the rash consequently resolved but he started reappearing about 3 days ago.  Again, it involved the back of her head and spread down to her upper back and middle back area as well as bilateral upper extremities.  She stated it gets itchy at some points but she does not scratch it.

## 2018-08-31 NOTE — PATIENT INSTRUCTIONS
Tips for Constipation:     1) I recommend that you increase water intake as well as fiber intake through diet as much as possible.     A) Aim for at least 64oz of water per day.   B) Great dietary sources of fiber are whole grains for breakfast (such as steel cut oats, barley, brown rice) or vegetables and fruits.       2) If needed, you can consider the addition of supplements such as Metamucil.      3) If constipation then continues, feel free to try over the counter alternatives such as Miralax, Docusate, or Glycerin Suppositories--all as directed by their respective labels.        4) If none of these home therapies are working or if you experience any worsening of symptoms (vomiting, fevers, weight loss, or blood in the stool), please call the [facility] main line to speak with my MA for further advice (she can get in touch with me) or to schedule the earliest available appointment.

## 2018-08-31 NOTE — ASSESSMENT & PLAN NOTE
New to me, chronic uncontrolled problem.  She has history of long-standing constipation of hypothyroidism but is adequately treated and followed by her endocrinologist.  She has a very hard stool about every other day.  She continued to drink plenty of water and uses MiraLAX, Benefiber and Metamucil daily.  She told me that she was seen by her gynecologist Dr. foster because she has a history of pelvic floor tear from her previous vaginal deliveries.  She was told that she is not a candidate for surgical repair because of her constipation risk.  She would like to see if there are failure options that she can try for constipation.  She has not tried any stimulant laxative before.

## 2018-08-31 NOTE — PROGRESS NOTES
Chief Complaint   Patient presents with   • Rash   • Constipation        Subjective:     History of Present Illness: Patient is a 48 y.o. female. This pleasant patient Reny Dalton who is here today for an acute visit.    Tinea versicolor  This is a new uncontrolled problem.  She reported couple of weeks history of a skin rash that started at the back of her neck area and then consequently spread to involve her back and bilateral upper arms area.  Described as a small papular, red, itchy areas.  She was seen at an urgent care facility initially, she was told that she has a poison ivy or poison AND treated with a Medrol pack.  She stated that the rash consequently resolved but he started reappearing about 3 days ago.  Again, it involved the back of her head and spread down to her upper back and middle back area as well as bilateral upper extremities.  She stated it gets itchy at some points but she does not scratch it.          Other constipation  New to me, chronic uncontrolled problem.  She has history of long-standing constipation of hypothyroidism but is adequately treated and followed by her endocrinologist.  She has a very hard stool about every other day.  She continued to drink plenty of water and uses MiraLAX, Benefiber and Metamucil daily.  She told me that she was seen by her gynecologist Dr. foster because she has a history of pelvic floor tear from her previous vaginal deliveries.  She was told that she is not a candidate for surgical repair because of her constipation risk.  She would like to see if there are failure options that she can try for constipation.  She has not tried any stimulant laxative before.          Allergies: Nkda [no known drug allergy]    Current Outpatient Prescriptions Ordered in [facility]   Medication Sig Dispense Refill   • selenium sulfide 2.5% (SELSUN) 2.5 % Lotion Apply to affected areas daily. 1 Bottle 1   • sennosides-docusate sodium (SENOKOT-S) 8.6-50 MG tablet Take 1  Tab by mouth every day. 30 Tab 6   • ALPRAZolam (XANAX) 1 MG Tab TAKE 1/2 TO 1 (ONE-HALF TO ONE) TABLET BY MOUTH THREE TIMES DAILY AS NEEDED FOR ANXIETY *F41.9* 90 Tab 3   • buPROPion (WELLBUTRIN XL) 150 MG XL tablet Take 1 Tab by mouth every morning. 30 Tab 6   • levothyroxine (SYNTHROID) 175 MCG Tab TAKE ONE TABLET BY MOUTH ONCE DAILY 90 Tab 1   • cyanocobalamin (VITAMIN B-12) 1000 MCG/ML Solution      • amitriptyline (ELAVIL) 50 MG Tab TAKE ONE TO TWO TABLETS BY MOUTH AT BEDTIME AS NEEDED FOR SLEEP 90 Tab 3   • Empagliflozin (JARDIANCE) 25 MG Tab Take 1 Tab by mouth every day. 30 Tab 4   • Somatropin (GENOTROPIN) 0.2 MG SOLR Inject 4 mg as instructed. 116 Each    • Garcinia Cambogia-Chromium 500-200 MG-MCG TABS Take  by mouth.     • LINZESS 145 MCG Cap      • acyclovir (ZOVIRAX) 400 MG tablet Take 3 x daily for 7 days for outbreak and then continue twice daily for maintenace therapy 180 Tab 3   • cyclobenzaprine (FLEXERIL) 10 MG Tab TAKE 1 TABLET BY MOUTH TWICE DAILY AS NEEDED 180 Tab 0   • cyclobenzaprine (FLEXERIL) 10 MG Tab TAKE ONE TABLET BY MOUTH TWICE DAILY AS NEEDED 60 Tab 0   • buPROPion (WELLBUTRIN XL) 300 MG XL tablet Take 1 Tab by mouth every morning. 30 Tab 5   • amitriptyline (ELAVIL) 150 MG Tab TAKE ONE TABLET BY MOUTH AT BEDTIME AS NEEDED FOR SLEEP 90 Tab 3   • NON SPECIFIED Hernia belt    Dx:   Right abdominal wall hernia 1 Each 0   • nabumetone (RELAFEN) 500 MG Tab Take 1 Tab by mouth 2 times a day. With food in place of ibuprofen for knee inflammation 60 Tab 0   • Somatropin (OMNITROPE SC) Inject 0.08 Each as instructed every day.       No current [facility]-ordered facility-administered medications on file.        Past Medical History:   Diagnosis Date   • Bronchitis 6/17/2014   • Early menopause     @ 39   • Hypothyroid 7/24/2009   • Obesity 7/24/2009       Past Surgical History:   Procedure Laterality Date   • PRIMARY C SECTION         Social History   Substance Use Topics   • Smoking status:  "Former Smoker     Packs/day: 1.00     Quit date: 2/20/2013   • Smokeless tobacco: Never Used   • Alcohol use Yes      Comment: occ       Family History   Problem Relation Age of Onset   • Alcohol/Drug Mother         accidental overdose   • Cancer Mother         breast   • Cancer Maternal Uncle         liver   • Cancer Maternal Aunt         breast   • Cancer Maternal Grandmother         breast       ROS:     - Constitutional: Negative for fever, chills, unexpected weight change, and fatigue/generalized weakness.     - HEENT: Negative for headaches, vision changes, hearing changes, ear pain, ear discharge, sinus congestion, sore throat, and neck pain.      - Respiratory: Negative for cough, sputum production, dyspnea and wheezing.    - Cardiovascular: Negative for chest pain, palpitations, orthopnea, and bilateral lower extremity edema.     - Gastrointestinal: + Constipation. Negative for heartburn, nausea, vomiting, abdominal pain, hematochezia, melena, diarrhea, and greasy/foul-smelling stools.     - Genitourinary: Negative for dysuria, polyuria, hematuria, pyuria, urinary urgency, and urinary incontinence.    - Musculoskeletal: Negative for myalgias, back pain, and joint pain.     - Skin: Per hpi.    - Neurological: Negative for dizziness, tingling, tremors, focal sensory deficit, focal weakness and headaches.     - Endo/Heme/Allergies: Does not bruise/bleed easily.     - Psychiatric/Behavioral: Negative for depression, suicidal/homicidal ideation and memory loss.        - NOTE: All other systems reviewed and are negative, except as in HPI.       Physical Exam:     Blood pressure 112/72, pulse 80, temperature 36.4 °C (97.5 °F), resp. rate 16, height 1.651 m (5' 5\"), weight 120.9 kg (266 lb 9.6 oz), SpO2 95 %, not currently breastfeeding. Body mass index is 44.36 kg/m².   General: Normal appearing. No distress.  HEENT: Normocephalic. Eyes conjunctiva clear lids without ptosis, pupils equal and reactive to light " accommodation, ears normal shape and contour, canals are clear bilaterally, tympanic membranes are benign,  oropharynx is without erythema, edema or exudates.    Neck: Supple without JVD or bruit. Thyroid is not enlarged.  Pulmonary: Clear to ausculation.  Normal effort. No rales, ronchi, or wheezing.  Cardiovascular: Regular rate and rhythm without murmur. Radial pulses are intact, regular and symmetrical bilaterally.  Abdomen: Soft, nontender, nondistended. Normal bowel sounds. Liver and spleen are not palpable.  Neurologic: Grossly non-focal.  Lymph: No cervical, occipital or supraclavicular lymph nodes are palpable  Skin: Erythematous papules and macular areas involving neck, back and bilateral upper arms area.  Musculoskeletal: Normal gait. No extremity cyanosis, clubbing, or edema.  Psych: Normal mood and affect. Alert and oriented x3. Judgment and insight is normal.      Assessment and Plan:     1. Tinea versicolor  New, uncontrolled problem.  Clinical exam was more consistent with tenia versicolor.  Proceed with a trial of selenium sulfide as below.  If no improvement follow-up with Reny Dalton in 2 weeks.  - selenium sulfide 2.5% (SELSUN) 2.5 % Lotion; Apply to affected areas daily.  Dispense: 1 Bottle; Refill: 1    2. Other constipation  New to me, chronic controlled problem.  Discussed different treatments for constipation.  Given handout information about stepwise management.  Drink plenty of fluids, currently using MiraLAX/Benefiber/Metamucil daily.  Advised docusate senna as a stimulant laxative if she is not having regular bowel movements with the above treatment.    - sennosides-docusate sodium (SENOKOT-S) 8.6-50 MG tablet; Take 1 Tab by mouth every day.  Dispense: 30 Tab; Refill: 6      Follow Up:      Return in about 2 months (around 10/31/2018) for Reny for DM .    Please note that this dictation was created using voice recognition software. I have made every reasonable attempt to  correct obvious errors, but I expect that there are errors of grammar and possibly content that I did not discover before finalizing the note.    Signed by: Amy Lloyd M.D.

## 2018-09-04 RX ORDER — LEVOTHYROXINE SODIUM 175 UG/1
TABLET ORAL
Qty: 90 TAB | Refills: 1 | Status: SHIPPED | OUTPATIENT
Start: 2018-09-04 | End: 2019-03-12 | Stop reason: SDUPTHER

## 2018-09-05 ENCOUNTER — TELEPHONE (OUTPATIENT)
Dept: MEDICAL GROUP | Facility: LAB | Age: 48
End: 2018-09-05

## 2018-09-05 DIAGNOSIS — B36.0 TINEA VERSICOLOR: ICD-10-CM

## 2018-09-05 NOTE — TELEPHONE ENCOUNTER
I did sign referral but please let pt know that it can take up to 3 months to be seen by dermatology.  Will you ask if she can take a picture of her rash from a few feet away and send it via my chart.  Also, ask if it's itchy.  Thank you

## 2018-09-05 NOTE — TELEPHONE ENCOUNTER
1. Caller Name: Bella                                        Call Back Number:       Patient approves a detailed voicemail message: my chart    Pt called states she was seen on 8/31 and given a cream (selenium sulfide 2.5% (SELSUN) 2.5 % Lotion) that is not working. It is holding her back from everyday life. She is requesting to see a dermatologist and would like to try an OTC medication along with the cream she was given. Please advise     2. SPECIFIC Action To Be Taken: Referral pending, please sign.    3. Diagnosis/Clinical Reason for Request: RASH    4. Specialty & Provider Name/Lab/Imaging Location: Dermatology    5. Is appointment scheduled for requested order/referral: no    Patient was informed they will receive a return phone call from the office ONLY if there are any questions before processing their request. Advised to call back if they haven't received a call from the referral department in 5 days.

## 2018-09-08 ENCOUNTER — HOSPITAL ENCOUNTER (OUTPATIENT)
Facility: MEDICAL CENTER | Age: 48
End: 2018-09-08
Attending: NURSE PRACTITIONER
Payer: COMMERCIAL

## 2018-09-08 DIAGNOSIS — E03.4 HYPOTHYROIDISM DUE TO ACQUIRED ATROPHY OF THYROID: ICD-10-CM

## 2018-09-08 DIAGNOSIS — E11.9 CONTROLLED TYPE 2 DIABETES MELLITUS WITHOUT COMPLICATION, WITHOUT LONG-TERM CURRENT USE OF INSULIN (HCC): ICD-10-CM

## 2018-09-08 LAB
ALBUMIN SERPL BCP-MCNC: 4.3 G/DL (ref 3.2–4.9)
ALBUMIN/GLOB SERPL: 1.4 G/DL
ALP SERPL-CCNC: 52 U/L (ref 30–99)
ALT SERPL-CCNC: 15 U/L (ref 2–50)
ANION GAP SERPL CALC-SCNC: 8 MMOL/L (ref 0–11.9)
AST SERPL-CCNC: 11 U/L (ref 12–45)
BASOPHILS # BLD AUTO: 0.5 % (ref 0–1.8)
BASOPHILS # BLD: 0.04 K/UL (ref 0–0.12)
BILIRUB SERPL-MCNC: 0.5 MG/DL (ref 0.1–1.5)
BUN SERPL-MCNC: 23 MG/DL (ref 8–22)
CALCIUM SERPL-MCNC: 10.7 MG/DL (ref 8.5–10.5)
CHLORIDE SERPL-SCNC: 103 MMOL/L (ref 96–112)
CHOLEST SERPL-MCNC: 228 MG/DL (ref 100–199)
CO2 SERPL-SCNC: 25 MMOL/L (ref 20–33)
CREAT SERPL-MCNC: 0.74 MG/DL (ref 0.5–1.4)
EOSINOPHIL # BLD AUTO: 0.15 K/UL (ref 0–0.51)
EOSINOPHIL NFR BLD: 1.9 % (ref 0–6.9)
ERYTHROCYTE [DISTWIDTH] IN BLOOD BY AUTOMATED COUNT: 41.2 FL (ref 35.9–50)
GLOBULIN SER CALC-MCNC: 3.1 G/DL (ref 1.9–3.5)
GLUCOSE SERPL-MCNC: 148 MG/DL (ref 65–99)
HCT VFR BLD AUTO: 47.7 % (ref 37–47)
HDLC SERPL-MCNC: 54 MG/DL
HGB BLD-MCNC: 16.4 G/DL (ref 12–16)
IMM GRANULOCYTES # BLD AUTO: 0.02 K/UL (ref 0–0.11)
IMM GRANULOCYTES NFR BLD AUTO: 0.2 % (ref 0–0.9)
LDLC SERPL CALC-MCNC: 141 MG/DL
LYMPHOCYTES # BLD AUTO: 1.85 K/UL (ref 1–4.8)
LYMPHOCYTES NFR BLD: 23.1 % (ref 22–41)
MCH RBC QN AUTO: 31.5 PG (ref 27–33)
MCHC RBC AUTO-ENTMCNC: 34.4 G/DL (ref 33.6–35)
MCV RBC AUTO: 91.7 FL (ref 81.4–97.8)
MONOCYTES # BLD AUTO: 0.5 K/UL (ref 0–0.85)
MONOCYTES NFR BLD AUTO: 6.2 % (ref 0–13.4)
NEUTROPHILS # BLD AUTO: 5.45 K/UL (ref 2–7.15)
NEUTROPHILS NFR BLD: 68.1 % (ref 44–72)
NRBC # BLD AUTO: 0 K/UL
NRBC BLD-RTO: 0 /100 WBC
PLATELET # BLD AUTO: 243 K/UL (ref 164–446)
PMV BLD AUTO: 10.1 FL (ref 9–12.9)
POTASSIUM SERPL-SCNC: 4.1 MMOL/L (ref 3.6–5.5)
PROT SERPL-MCNC: 7.4 G/DL (ref 6–8.2)
RBC # BLD AUTO: 5.2 M/UL (ref 4.2–5.4)
SODIUM SERPL-SCNC: 136 MMOL/L (ref 135–145)
T4 FREE SERPL-MCNC: 0.98 NG/DL (ref 0.53–1.43)
TRIGL SERPL-MCNC: 166 MG/DL (ref 0–149)
TSH SERPL DL<=0.005 MIU/L-ACNC: 0.34 UIU/ML (ref 0.38–5.33)
WBC # BLD AUTO: 8 K/UL (ref 4.8–10.8)

## 2018-09-08 PROCEDURE — 80053 COMPREHEN METABOLIC PANEL: CPT

## 2018-09-08 PROCEDURE — 84439 ASSAY OF FREE THYROXINE: CPT

## 2018-09-08 PROCEDURE — 83036 HEMOGLOBIN GLYCOSYLATED A1C: CPT

## 2018-09-08 PROCEDURE — 80061 LIPID PANEL: CPT

## 2018-09-08 PROCEDURE — 85025 COMPLETE CBC W/AUTO DIFF WBC: CPT

## 2018-09-08 PROCEDURE — 84443 ASSAY THYROID STIM HORMONE: CPT

## 2018-09-09 DIAGNOSIS — L50.9 URTICARIA: ICD-10-CM

## 2018-09-09 LAB
EST. AVERAGE GLUCOSE BLD GHB EST-MCNC: 123 MG/DL
HBA1C MFR BLD: 5.9 % (ref 0–5.6)

## 2018-09-09 RX ORDER — HYDROXYZINE HYDROCHLORIDE 25 MG/1
25 TABLET, FILM COATED ORAL EVERY EVENING
Qty: 15 TAB | Refills: 1 | Status: SHIPPED | OUTPATIENT
Start: 2018-09-09 | End: 2019-07-27

## 2018-09-09 RX ORDER — TRIAMCINOLONE ACETONIDE 1 MG/G
1 OINTMENT TOPICAL 2 TIMES DAILY
Qty: 1 TUBE | Refills: 0 | Status: SHIPPED | OUTPATIENT
Start: 2018-09-09 | End: 2019-07-27

## 2018-09-11 ENCOUNTER — TELEPHONE (OUTPATIENT)
Dept: MEDICAL GROUP | Facility: LAB | Age: 48
End: 2018-09-11

## 2018-09-11 DIAGNOSIS — K59.04 CHRONIC IDIOPATHIC CONSTIPATION: ICD-10-CM

## 2018-09-11 DIAGNOSIS — R14.0 BLOATING: ICD-10-CM

## 2018-09-11 NOTE — TELEPHONE ENCOUNTER
Patient requesting referral to GI ( pending in orders) she states that she is having chronic constipation and bloating and she has tried and failed stool softener, miralax, fiber, probiotics and nothing has helped. Would like to see GI.

## 2018-09-25 ENCOUNTER — HOSPITAL ENCOUNTER (OUTPATIENT)
Facility: MEDICAL CENTER | Age: 48
End: 2018-09-25
Payer: COMMERCIAL

## 2018-09-25 LAB
BDY FAT % MEASURED: 47.1 %
BP DIAS: 79 MMHG
BP SYS: 112 MMHG
CHOLEST SERPL-MCNC: 203 MG/DL (ref 100–199)
DIABETES HTDIA: YES
EST. AVERAGE GLUCOSE BLD GHB EST-MCNC: 123 MG/DL
EVENT NAME HTEVT: NORMAL
FASTING HTFAS: YES
GLUCOSE SERPL-MCNC: 120 MG/DL (ref 65–99)
HBA1C MFR BLD: 5.9 % (ref 0–5.6)
HDLC SERPL-MCNC: 51 MG/DL
HYPERTENSION HTHYP: NO
LDLC SERPL CALC-MCNC: 116 MG/DL
SCREENING LOC CITY HTCIT: NORMAL
SCREENING LOC STATE HTSTA: NORMAL
SCREENING LOCATION HTLOC: NORMAL
SMOKING HTSMO: NO
SUBSCRIBER ID HTSID: NORMAL
TRIGL SERPL-MCNC: 181 MG/DL (ref 0–149)

## 2018-09-25 PROCEDURE — 83036 HEMOGLOBIN GLYCOSYLATED A1C: CPT

## 2018-09-25 PROCEDURE — 80061 LIPID PANEL: CPT

## 2018-09-25 PROCEDURE — S5190 WELLNESS ASSESSMENT BY NONPH: HCPCS

## 2018-09-25 PROCEDURE — 82947 ASSAY GLUCOSE BLOOD QUANT: CPT

## 2018-09-25 PROCEDURE — 36415 COLL VENOUS BLD VENIPUNCTURE: CPT

## 2018-10-12 ENCOUNTER — APPOINTMENT (RX ONLY)
Dept: URBAN - METROPOLITAN AREA CLINIC 4 | Facility: CLINIC | Age: 48
Setting detail: DERMATOLOGY
End: 2018-10-12

## 2018-10-12 DIAGNOSIS — L30.9 DERMATITIS, UNSPECIFIED: ICD-10-CM

## 2018-10-12 PROCEDURE — ? DIAGNOSIS COMMENT

## 2018-10-12 PROCEDURE — 99202 OFFICE O/P NEW SF 15 MIN: CPT | Mod: 25

## 2018-10-12 PROCEDURE — ? BIOPSY BY SHAVE METHOD

## 2018-10-12 PROCEDURE — ? PRESCRIPTION

## 2018-10-12 PROCEDURE — 11101: CPT

## 2018-10-12 PROCEDURE — ? COUNSELING

## 2018-10-12 PROCEDURE — 11100: CPT

## 2018-10-12 RX ORDER — CLOBETASOL PROPIONATE 0.5 MG/G
OINTMENT TOPICAL
Qty: 60 | Refills: 3 | Status: ERX | COMMUNITY
Start: 2018-10-12

## 2018-10-12 RX ADMIN — CLOBETASOL PROPIONATE: 0.5 OINTMENT TOPICAL at 00:00

## 2018-10-12 ASSESSMENT — LOCATION SIMPLE DESCRIPTION DERM
LOCATION SIMPLE: CHEST
LOCATION SIMPLE: NECK
LOCATION SIMPLE: RIGHT UPPER BACK

## 2018-10-12 ASSESSMENT — LOCATION DETAILED DESCRIPTION DERM
LOCATION DETAILED: UPPER STERNUM
LOCATION DETAILED: RIGHT MEDIAL UPPER BACK
LOCATION DETAILED: LEFT CENTRAL LATERAL NECK
LOCATION DETAILED: RIGHT SUPERIOR MEDIAL UPPER BACK

## 2018-10-12 ASSESSMENT — LOCATION ZONE DERM
LOCATION ZONE: NECK
LOCATION ZONE: TRUNK

## 2018-10-12 NOTE — PROCEDURE: DIAGNOSIS COMMENT
Comment: DDx includes contact dermatitis, Atypical molluscum, fibrofolliculomas (BHD), sebaceous adenomas (Cowden's). Latter two considerations less favored due to sudden onset and appearance of lesions.
Detail Level: Zone

## 2018-10-12 NOTE — HPI: RASH
What Type Of Note Output Would You Prefer (Optional)?: Standard Output
How Severe Is Your Rash?: mild
Is This A New Presentation, Or A Follow-Up?: Rash
Additional History: Patient states the rash presented initially as poison ivy. She states she was seen in urgent care, given an injection and a prednisone taper. She states this worked but the rash came back with a vengeance. Patient states she then went to see her PCP and was given several different medications that did not work. Patient is in for further evaluation and management.

## 2018-11-01 DIAGNOSIS — F41.1 GAD (GENERALIZED ANXIETY DISORDER): ICD-10-CM

## 2018-11-01 RX ORDER — ALPRAZOLAM 1 MG/1
1 TABLET ORAL 3 TIMES DAILY PRN
Qty: 90 TAB | Refills: 3 | Status: SHIPPED
Start: 2018-11-01 | End: 2018-12-03 | Stop reason: SDUPTHER

## 2018-11-01 NOTE — TELEPHONE ENCOUNTER
Was the patient seen in the last year in this department? Yes  [facility] 10/4/18 #90  Does patient have an active prescription for medications requested? No     Received Request Via: Patient

## 2018-11-04 RX ORDER — BUPROPION HYDROCHLORIDE 150 MG/1
TABLET ORAL
Qty: 30 TAB | Refills: 0 | Status: SHIPPED | OUTPATIENT
Start: 2018-11-04 | End: 2018-12-03 | Stop reason: SDUPTHER

## 2018-11-28 ENCOUNTER — HOSPITAL ENCOUNTER (OUTPATIENT)
Dept: LAB | Facility: MEDICAL CENTER | Age: 48
End: 2018-11-28
Attending: DERMATOLOGY
Payer: COMMERCIAL

## 2018-11-28 ENCOUNTER — APPOINTMENT (RX ONLY)
Dept: URBAN - METROPOLITAN AREA CLINIC 4 | Facility: CLINIC | Age: 48
Setting detail: DERMATOLOGY
End: 2018-11-28

## 2018-11-28 DIAGNOSIS — L92.0 GRANULOMA ANNULARE: ICD-10-CM

## 2018-11-28 LAB
ALBUMIN SERPL BCP-MCNC: 4.4 G/DL (ref 3.2–4.9)
ALP SERPL-CCNC: 53 U/L (ref 30–99)
ALT SERPL-CCNC: 17 U/L (ref 2–50)
AST SERPL-CCNC: 11 U/L (ref 12–45)
BASOPHILS # BLD AUTO: 0.7 % (ref 0–1.8)
BASOPHILS # BLD: 0.04 K/UL (ref 0–0.12)
BILIRUB CONJ SERPL-MCNC: 0.2 MG/DL (ref 0.1–0.5)
BILIRUB INDIRECT SERPL-MCNC: 0.5 MG/DL (ref 0–1)
BILIRUB SERPL-MCNC: 0.7 MG/DL (ref 0.1–1.5)
EOSINOPHIL # BLD AUTO: 0.15 K/UL (ref 0–0.51)
EOSINOPHIL NFR BLD: 2.7 % (ref 0–6.9)
ERYTHROCYTE [DISTWIDTH] IN BLOOD BY AUTOMATED COUNT: 44.2 FL (ref 35.9–50)
HCT VFR BLD AUTO: 49.4 % (ref 37–47)
HGB BLD-MCNC: 16.6 G/DL (ref 12–16)
IMM GRANULOCYTES # BLD AUTO: 0.02 K/UL (ref 0–0.11)
IMM GRANULOCYTES NFR BLD AUTO: 0.4 % (ref 0–0.9)
LYMPHOCYTES # BLD AUTO: 1.89 K/UL (ref 1–4.8)
LYMPHOCYTES NFR BLD: 33.4 % (ref 22–41)
MCH RBC QN AUTO: 31.7 PG (ref 27–33)
MCHC RBC AUTO-ENTMCNC: 33.6 G/DL (ref 33.6–35)
MCV RBC AUTO: 94.3 FL (ref 81.4–97.8)
MONOCYTES # BLD AUTO: 0.48 K/UL (ref 0–0.85)
MONOCYTES NFR BLD AUTO: 8.5 % (ref 0–13.4)
NEUTROPHILS # BLD AUTO: 3.08 K/UL (ref 2–7.15)
NEUTROPHILS NFR BLD: 54.3 % (ref 44–72)
NRBC # BLD AUTO: 0 K/UL
NRBC BLD-RTO: 0 /100 WBC
PLATELET # BLD AUTO: 232 K/UL (ref 164–446)
PMV BLD AUTO: 10.3 FL (ref 9–12.9)
PROT SERPL-MCNC: 7.6 G/DL (ref 6–8.2)
RBC # BLD AUTO: 5.24 M/UL (ref 4.2–5.4)
WBC # BLD AUTO: 5.7 K/UL (ref 4.8–10.8)

## 2018-11-28 PROCEDURE — 80076 HEPATIC FUNCTION PANEL: CPT

## 2018-11-28 PROCEDURE — 85025 COMPLETE CBC W/AUTO DIFF WBC: CPT

## 2018-11-28 PROCEDURE — 36415 COLL VENOUS BLD VENIPUNCTURE: CPT

## 2018-11-28 PROCEDURE — ? MEDICATION COUNSELING

## 2018-11-28 PROCEDURE — ? ADDITIONAL NOTES

## 2018-11-28 PROCEDURE — 99214 OFFICE O/P EST MOD 30 MIN: CPT

## 2018-11-28 PROCEDURE — ? COUNSELING

## 2018-11-28 PROCEDURE — ? ORDER TESTS

## 2018-11-28 NOTE — PROCEDURE: ADDITIONAL NOTES
Additional Notes: Discussed first step would be minocycline, then can consider plaquenil. Dilated eye exam  baseline then yearly. Labs in two months. Pt opts to start with plaquenil first.
Detail Level: Simple

## 2018-11-28 NOTE — PROCEDURE: MEDICATION COUNSELING
Quinolones Counseling:  I discussed with the patient the risks of fluoroquinolones including but not limited to GI upset, allergic reaction, drug rash, diarrhea, dizziness, photosensitivity, yeast infections, liver function test abnormalities, tendonitis/tendon rupture.
Isotretinoin Counseling: Patient should get monthly blood tests, not donate blood, not drive at night if vision affected, not share medication, and not undergo elective surgery for 6 months after tx completed. Side effects reviewed, pt to contact office should one occur.
Clofazimine Counseling:  I discussed with the patient the risks of clofazimine including but not limited to skin and eye pigmentation, liver damage, nausea/vomiting, gastrointestinal bleeding and allergy.
Griseofulvin Counseling:  I discussed with the patient the risks of griseofulvin including but not limited to photosensitivity, cytopenia, liver damage, nausea/vomiting and severe allergy.  The patient understands that this medication is best absorbed when taken with a fatty meal (e.g., ice cream or french fries).
Cellcept Pregnancy And Lactation Text: This medication is Pregnancy Category D and isn't considered safe during pregnancy. It is unknown if this medication is excreted in breast milk.
Wartpeel Pregnancy And Lactation Text: This medication is Pregnancy Category X and contraindicated in pregnancy and in women who may become pregnant. It is unknown if this medication is excreted in breast milk.
Glycopyrrolate Counseling:  I discussed with the patient the risks of glycopyrrolate including but not limited to skin rash, drowsiness, dry mouth, difficulty urinating, and blurred vision.
Enbrel Counseling:  I discussed with the patient the risks of etanercept including but not limited to myelosuppression, immunosuppression, autoimmune hepatitis, demyelinating diseases, lymphoma, and infections.  The patient understands that monitoring is required including a PPD at baseline and must alert us or the primary physician if symptoms of infection or other concerning signs are noted.
Cimetidine Pregnancy And Lactation Text: This medication is Pregnancy Category B and is considered safe during pregnancy. It is also excreted in breast milk and breast feeding isn't recommended.
Otezla Pregnancy And Lactation Text: This medication is Pregnancy Category C and it isn't known if it is safe during pregnancy. It is unknown if it is excreted in breast milk.
Imiquimod Pregnancy And Lactation Text: This medication is Pregnancy Category C. It is unknown if this medication is excreted in breast milk.
Siliq Counseling:  I discussed with the patient the risks of Siliq including but not limited to new or worsening depression, suicidal thoughts and behavior, immunosuppression, malignancy, posterior leukoencephalopathy syndrome, and serious infections.  The patient understands that monitoring is required including a PPD at baseline and must alert us or the primary physician if symptoms of infection or other concerning signs are noted. There is also a special program designed to monitor depression which is required with Siliq.
Clindamycin Pregnancy And Lactation Text: This medication can be used in pregnancy if certain situations. Clindamycin is also present in breast milk.
Thalidomide Pregnancy And Lactation Text: This medication is Pregnancy Category X and is absolutely contraindicated during pregnancy. It is unknown if it is excreted in breast milk.
Topical Retinoid counseling:  Patient advised to apply a pea-sized amount only at bedtime and wait 30 minutes after washing their face before applying.  If too drying, patient may add a non-comedogenic moisturizer. The patient verbalized understanding of the proper use and possible adverse effects of retinoids.  All of the patient's questions and concerns were addressed.
Tremfya Pregnancy And Lactation Text: The risk during pregnancy and breastfeeding is uncertain with this medication.
Clofazimine Pregnancy And Lactation Text: This medication is Pregnancy Category C and isn't considered safe during pregnancy. It is excreted in breast milk.
Isotretinoin Pregnancy And Lactation Text: This medication is Pregnancy Category X and is considered extremely dangerous during pregnancy. It is unknown if it is excreted in breast milk.
Zyclara Counseling:  I discussed with the patient the risks of imiquimod including but not limited to erythema, scaling, itching, weeping, crusting, and pain.  Patient understands that the inflammatory response to imiquimod is variable from person to person and was educated regarded proper titration schedule.  If flu-like symptoms develop, patient knows to discontinue the medication and contact us.
Cyclophosphamide Counseling:  I discussed with the patient the risks of cyclophosphamide including but not limited to hair loss, hormonal abnormalities, decreased fertility, abdominal pain, diarrhea, nausea and vomiting, bone marrow suppression and infection. The patient understands that monitoring is required while taking this medication.
Griseofulvin Pregnancy And Lactation Text: This medication is Pregnancy Category X and is known to cause serious birth defects. It is unknown if this medication is excreted in breast milk but breast feeding should be avoided.
Doxepin Counseling:  Patient advised that the medication is sedating and not to drive a car after taking this medication. Patient informed of potential adverse effects including but not limited to dry mouth, urinary retention, and blurry vision.  The patient verbalized understanding of the proper use and possible adverse effects of doxepin.  All of the patient's questions and concerns were addressed.
Quinolones Pregnancy And Lactation Text: This medication is Pregnancy Category C and it isn't know if it is safe during pregnancy. It is also excreted in breast milk.
Oxybutynin Counseling:  I discussed with the patient the risks of oxybutynin including but not limited to skin rash, drowsiness, dry mouth, difficulty urinating, and blurred vision.
Xeljanz Counseling: I discussed with the patient the risks of Xeljanz therapy including increased risk of infection, liver issues, headache, diarrhea, or cold symptoms. Live vaccines should be avoided. They were instructed to call if they have any problems.
Valtrex Counseling: I discussed with the patient the risks of valacyclovir including but not limited to kidney damage, nausea, vomiting and severe allergy.  The patient understands that if the infection seems to be worsening or is not improving, they are to call.
Enbrel Pregnancy And Lactation Text: This medication is Pregnancy Category B and is considered safe during pregnancy. It is unknown if this medication is excreted in breast milk.
Drysol Counseling:  I discussed with the patient the risks of drysol/aluminum chloride including but not limited to skin rash, itching, irritation, burning.
Glycopyrrolate Pregnancy And Lactation Text: This medication is Pregnancy Category B and is considered safe during pregnancy. It is unknown if it is excreted breast milk.
Cyclophosphamide Pregnancy And Lactation Text: This medication is Pregnancy Category D and it isn't considered safe during pregnancy. This medication is excreted in breast milk.
Doxycycline Counseling:  Patient counseled regarding possible photosensitivity and increased risk for sunburn.  Patient instructed to avoid sunlight, if possible.  When exposed to sunlight, patients should wear protective clothing, sunglasses, and sunscreen.  The patient was instructed to call the office immediately if the following severe adverse effects occur:  hearing changes, easy bruising/bleeding, severe headache, or vision changes.  The patient verbalized understanding of the proper use and possible adverse effects of doxycycline.  All of the patient's questions and concerns were addressed.
Itraconazole Counseling:  I discussed with the patient the risks of itraconazole including but not limited to liver damage, nausea/vomiting, neuropathy, and severe allergy.  The patient understands that this medication is best absorbed when taken with acidic beverages such as non-diet cola or ginger ale.  The patient understands that monitoring is required including baseline LFTs and repeat LFTs at intervals.  The patient understands that they are to contact us or the primary physician if concerning signs are noted.
Oxybutynin Pregnancy And Lactation Text: This medication is Pregnancy Category B and is considered safe during pregnancy. It is unknown if it is excreted in breast milk.
Minoxidil Counseling: Minoxidil is a topical medication which can increase blood flow where it is applied. It is uncertain how this medication increases hair growth. Side effects are uncommon and include stinging and allergic reactions.
Doxepin Pregnancy And Lactation Text: This medication is Pregnancy Category C and it isn't known if it is safe during pregnancy. It is also excreted in breast milk and breast feeding isn't recommended.
Colchicine Counseling:  Patient counseled regarding adverse effects including but not limited to stomach upset (nausea, vomiting, stomach pain, or diarrhea).  Patient instructed to limit alcohol consumption while taking this medication.  Colchicine may reduce blood counts especially with prolonged use.  The patient understands that monitoring of kidney function and blood counts may be required, especially at baseline. The patient verbalized understanding of the proper use and possible adverse effects of colchicine.  All of the patient's questions and concerns were addressed.
High Dose Vitamin A Counseling: Side effects reviewed, pt to contact office should one occur.
Tazorac Counseling:  Patient advised that medication is irritating and drying.  Patient may need to apply sparingly and wash off after an hour before eventually leaving it on overnight.  The patient verbalized understanding of the proper use and possible adverse effects of tazorac.  All of the patient's questions and concerns were addressed.
Valtrex Pregnancy And Lactation Text: this medication is Pregnancy Category B and is considered safe during pregnancy. This medication is not directly found in breast milk but it's metabolite acyclovir is present.
Xelpiliz Pregnancy And Lactation Text: This medication is Pregnancy Category D and is not considered safe during pregnancy.  The risk during breast feeding is also uncertain.
Rifampin Counseling: I discussed with the patient the risks of rifampin including but not limited to liver damage, kidney damage, red-orange body fluids, nausea/vomiting and severe allergy.
Drysol Pregnancy And Lactation Text: This medication is considered safe during pregnancy and breast feeding.
Hydroxychloroquine Counseling:  I discussed with the patient that a baseline ophthalmologic exam is needed at the start of therapy and every year thereafter while on therapy. A CBC may also be warranted for monitoring.  The side effects of this medication were discussed with the patient, including but not limited to agranulocytosis, aplastic anemia, seizures, rashes, retinopathy, and liver toxicity. Patient instructed to call the office should any adverse effect occur.  The patient verbalized understanding of the proper use and possible adverse effects of Plaquenil.  All the patient's questions and concerns were addressed.
Humira Counseling:  I discussed with the patient the risks of adalimumab including but not limited to myelosuppression, immunosuppression, autoimmune hepatitis, demyelinating diseases, lymphoma, and serious infections.  The patient understands that monitoring is required including a PPD at baseline and must alert us or the primary physician if symptoms of infection or other concerning signs are noted.
Simponi Counseling:  I discussed with the patient the risks of golimumab including but not limited to myelosuppression, immunosuppression, autoimmune hepatitis, demyelinating diseases, lymphoma, and serious infections.  The patient understands that monitoring is required including a PPD at baseline and must alert us or the primary physician if symptoms of infection or other concerning signs are noted.
Doxycycline Pregnancy And Lactation Text: This medication is Pregnancy Category D and not consider safe during pregnancy. It is also excreted in breast milk but is considered safe for shorter treatment courses.
High Dose Vitamin A Pregnancy And Lactation Text: High dose vitamin A therapy is contraindicated during pregnancy and breast feeding.
Cyclosporine Counseling:  I discussed with the patient the risks of cyclosporine including but not limited to hypertension, gingival hyperplasia,myelosuppression, immunosuppression, liver damage, kidney damage, neurotoxicity, lymphoma, and serious infections. The patient understands that monitoring is required including baseline blood pressure, CBC, CMP, lipid panel and uric acid, and then 1-2 times monthly CMP and blood pressure.
Hydroxychloroquine Pregnancy And Lactation Text: This medication has been shown to cause fetal harm but it isn't assigned a Pregnancy Risk Category. There are small amounts excreted in breast milk.
Elidel Counseling: Patient may experience a mild burning sensation during topical application. Elidel is not approved in children less than 2 years of age. There have been case reports of hematologic and skin malignancies in patients using topical calcineurin inhibitors although causality is questionable.
Hydroxyzine Counseling: Patient advised that the medication is sedating and not to drive a car after taking this medication.  Patient informed of potential adverse effects including but not limited to dry mouth, urinary retention, and blurry vision.  The patient verbalized understanding of the proper use and possible adverse effects of hydroxyzine.  All of the patient's questions and concerns were addressed.
Azithromycin Counseling:  I discussed with the patient the risks of azithromycin including but not limited to GI upset, allergic reaction, drug rash, diarrhea, and yeast infections.
Minoxidil Pregnancy And Lactation Text: This medication has not been assigned a Pregnancy Risk Category but animal studies failed to show danger with the topical medication. It is unknown if the medication is excreted in breast milk.
Birth Control Pills Counseling: Birth Control Pill Counseling: I discussed with the patient the potential side effects of OCPs including but not limited to increased risk of stroke, heart attack, thrombophlebitis, deep venous thrombosis, hepatic adenomas, breast changes, GI upset, headaches, and depression.  The patient verbalized understanding of the proper use and possible adverse effects of OCPs. All of the patient's questions and concerns were addressed.
Erythromycin Counseling:  I discussed with the patient the risks of erythromycin including but not limited to GI upset, allergic reaction, drug rash, diarrhea, increase in liver enzymes, and yeast infections.
Xolair Counseling:  Patient informed of potential adverse effects including but not limited to fever, muscle aches, rash and allergic reactions.  The patient verbalized understanding of the proper use and possible adverse effects of Xolair.  All of the patient's questions and concerns were addressed.
Rifampin Pregnancy And Lactation Text: This medication is Pregnancy Category C and it isn't know if it is safe during pregnancy. It is also excreted in breast milk and should not be used if you are breast feeding.
Tazorac Pregnancy And Lactation Text: This medication is not safe during pregnancy. It is unknown if this medication is excreted in breast milk.
Use Enhanced Medication Counseling?: No
Dapsone Counseling: I discussed with the patient the risks of dapsone including but not limited to hemolytic anemia, agranulocytosis, rashes, methemoglobinemia, kidney failure, peripheral neuropathy, headaches, GI upset, and liver toxicity.  Patients who start dapsone require monitoring including baseline LFTs and weekly CBCs for the first month, then every month thereafter.  The patient verbalized understanding of the proper use and possible adverse effects of dapsone.  All of the patient's questions and concerns were addressed.
Cimzia Counseling:  I discussed with the patient the risks of Cimzia including but not limited to immunosuppression, allergic reactions and infections.  The patient understands that monitoring is required including a PPD at baseline and must alert us or the primary physician if symptoms of infection or other concerning signs are noted.
Ketoconazole Counseling:   Patient counseled regarding improving absorption with orange juice.  Adverse effects include but are not limited to breast enlargement, headache, diarrhea, nausea, upset stomach, liver function test abnormalities, taste disturbance, and stomach pain.  There is a rare possibility of liver failure that can occur when taking ketoconazole. The patient understands that monitoring of LFTs may be required, especially at baseline. The patient verbalized understanding of the proper use and possible adverse effects of ketoconazole.  All of the patient's questions and concerns were addressed.
Cyclosporine Pregnancy And Lactation Text: This medication is Pregnancy Category C and it isn't know if it is safe during pregnancy. This medication is excreted in breast milk.
Nsaids Counseling: NSAID Counseling: I discussed with the patient that NSAIDs should be taken with food. Prolonged use of NSAIDs can result in the development of stomach ulcers.  Patient advised to stop taking NSAIDs if abdominal pain occurs.  The patient verbalized understanding of the proper use and possible adverse effects of NSAIDs.  All of the patient's questions and concerns were addressed.
Hydroxyzine Pregnancy And Lactation Text: This medication is not safe during pregnancy and should not be taken. It is also excreted in breast milk and breast feeding isn't recommended.
Picato Counseling:  I discussed with the patient the risks of Picato including but not limited to erythema, scaling, itching, weeping, crusting, and pain.
Birth Control Pills Pregnancy And Lactation Text: This medication should be avoided if pregnant and for the first 30 days post-partum.
Tetracycline Counseling: Patient counseled regarding possible photosensitivity and increased risk for sunburn.  Patient instructed to avoid sunlight, if possible.  When exposed to sunlight, patients should wear protective clothing, sunglasses, and sunscreen.  The patient was instructed to call the office immediately if the following severe adverse effects occur:  hearing changes, easy bruising/bleeding, severe headache, or vision changes.  The patient verbalized understanding of the proper use and possible adverse effects of tetracycline.  All of the patient's questions and concerns were addressed. Patient understands to avoid pregnancy while on therapy due to potential birth defects.
Xolair Pregnancy And Lactation Text: This medication is Pregnancy Category B and is considered safe during pregnancy. This medication is excreted in breast milk.
Azithromycin Pregnancy And Lactation Text: This medication is considered safe during pregnancy and is also secreted in breast milk.
Ilumya Counseling: I discussed with the patient the risks of tildrakizumab including but not limited to immunosuppression, malignancy, posterior leukoencephalopathy syndrome, and serious infections.  The patient understands that monitoring is required including a PPD at baseline and must alert us or the primary physician if symptoms of infection or other concerning signs are noted.
Topical Clindamycin Counseling: Patient counseled that this medication may cause skin irritation or allergic reactions.  In the event of skin irritation, the patient was advised to reduce the amount of the drug applied or use it less frequently.   The patient verbalized understanding of the proper use and possible adverse effects of clindamycin.  All of the patient's questions and concerns were addressed.
Methotrexate Counseling:  Patient counseled regarding adverse effects of methotrexate including but not limited to nausea, vomiting, abnormalities in liver function tests. Patients may develop mouth sores, rash, diarrhea, and abnormalities in blood counts. The patient understands that monitoring is required including LFT's and blood counts.  There is a rare possibility of scarring of the liver and lung problems that can occur when taking methotrexate. Persistent nausea, loss of appetite, pale stools, dark urine, cough, and shortness of breath should be reported immediately. Patient advised to discontinue methotrexate treatment at least three months before attempting to become pregnant.  I discussed the need for folate supplements while taking methotrexate.  These supplements can decrease side effects during methotrexate treatment. The patient verbalized understanding of the proper use and possible adverse effects of methotrexate.  All of the patient's questions and concerns were addressed.
Ketoconazole Pregnancy And Lactation Text: This medication is Pregnancy Category C and it isn't know if it is safe during pregnancy. It is also excreted in breast milk and breast feeding isn't recommended.
Stelara Counseling:  I discussed with the patient the risks of ustekinumab including but not limited to immunosuppression, malignancy, posterior leukoencephalopathy syndrome, and serious infections.  The patient understands that monitoring is required including a PPD at baseline and must alert us or the primary physician if symptoms of infection or other concerning signs are noted.
Erythromycin Pregnancy And Lactation Text: This medication is Pregnancy Category B and is considered safe during pregnancy. It is also excreted in breast milk.
Spironolactone Counseling: Patient advised regarding risks of diarrhea, abdominal pain, hyperkalemia, birth defects (for female patients), liver toxicity and renal toxicity. The patient may need blood work to monitor liver and kidney function and potassium levels while on therapy. The patient verbalized understanding of the proper use and possible adverse effects of spironolactone.  All of the patient's questions and concerns were addressed.
Cimzia Pregnancy And Lactation Text: This medication crosses the placenta but can be considered safe in certain situations. Cimzia may be excreted in breast milk.
Dapsone Pregnancy And Lactation Text: This medication is Pregnancy Category C and is not considered safe during pregnancy or breast feeding.
Benzoyl Peroxide Counseling: Patient counseled that medicine may cause skin irritation and bleach clothing.  In the event of skin irritation, the patient was advised to reduce the amount of the drug applied or use it less frequently.   The patient verbalized understanding of the proper use and possible adverse effects of benzoyl peroxide.  All of the patient's questions and concerns were addressed.
Bactrim Counseling:  I discussed with the patient the risks of sulfa antibiotics including but not limited to GI upset, allergic reaction, drug rash, diarrhea, dizziness, photosensitivity, and yeast infections.  Rarely, more serious reactions can occur including but not limited to aplastic anemia, agranulocytosis, methemoglobinemia, blood dyscrasias, liver or kidney failure, lung infiltrates or desquamative/blistering drug rashes.
Tetracycline Pregnancy And Lactation Text: This medication is Pregnancy Category D and not consider safe during pregnancy. It is also excreted in breast milk.
Eucrisa Counseling: Patient may experience a mild burning sensation during topical application. Eucrisa is not approved in children less than 2 years of age.
Nsaids Pregnancy And Lactation Text: These medications are considered safe up to 30 weeks gestation. It is excreted in breast milk.
Metronidazole Counseling:  I discussed with the patient the risks of metronidazole including but not limited to seizures, nausea/vomiting, a metallic taste in the mouth, nausea/vomiting and severe allergy.
Acitretin Counseling:  I discussed with the patient the risks of acitretin including but not limited to hair loss, dry lips/skin/eyes, liver damage, hyperlipidemia, depression/suicidal ideation, photosensitivity.  Serious rare side effects can include but are not limited to pancreatitis, pseudotumor cerebri, bony changes, clot formation/stroke/heart attack.  Patient understands that alcohol is contraindicated since it can result in liver toxicity and significantly prolong the elimination of the drug by many years.
Spironolactone Pregnancy And Lactation Text: This medication can cause feminization of the male fetus and should be avoided during pregnancy. The active metabolite is also found in breast milk.
Cosentyx Counseling:  I discussed with the patient the risks of Cosentyx including but not limited to worsening of Crohn's disease, immunosuppression, allergic reactions and infections.  The patient understands that monitoring is required including a PPD at baseline and must alert us or the primary physician if symptoms of infection or other concerning signs are noted.
Protopic Counseling: Patient may experience a mild burning sensation during topical application. Protopic is not approved in children less than 2 years of age. There have been case reports of hematologic and skin malignancies in patients using topical calcineurin inhibitors although causality is questionable.
Terbinafine Counseling: Patient counseling regarding adverse effects of terbinafine including but not limited to headache, diarrhea, rash, upset stomach, liver function test abnormalities, itching, taste/smell disturbance, nausea, abdominal pain, and flatulence.  There is a rare possibility of liver failure that can occur when taking terbinafine.  The patient understands that a baseline LFT and kidney function test may be required. The patient verbalized understanding of the proper use and possible adverse effects of terbinafine.  All of the patient's questions and concerns were addressed.
Erivedge Counseling- I discussed with the patient the risks of Erivedge including but not limited to nausea, vomiting, diarrhea, constipation, weight loss, changes in the sense of taste, decreased appetite, muscle spasms, and hair loss.  The patient verbalized understanding of the proper use and possible adverse effects of Erivedge.  All of the patient's questions and concerns were addressed.
Benzoyl Peroxide Pregnancy And Lactation Text: This medication is Pregnancy Category C. It is unknown if benzoyl peroxide is excreted in breast milk.
Infliximab Counseling:  I discussed with the patient the risks of infliximab including but not limited to myelosuppression, immunosuppression, autoimmune hepatitis, demyelinating diseases, lymphoma, and serious infections.  The patient understands that monitoring is required including a PPD at baseline and must alert us or the primary physician if symptoms of infection or other concerning signs are noted.
Methotrexate Pregnancy And Lactation Text: This medication is Pregnancy Category X and is known to cause fetal harm. This medication is excreted in breast milk.
Odomzo Counseling- I discussed with the patient the risks of Odomzo including but not limited to nausea, vomiting, diarrhea, constipation, weight loss, changes in the sense of taste, decreased appetite, muscle spasms, and hair loss.  The patient verbalized understanding of the proper use and possible adverse effects of Odomzo.  All of the patient's questions and concerns were addressed.
Albendazole Counseling:  I discussed with the patient the risks of albendazole including but not limited to cytopenia, kidney damage, nausea/vomiting and severe allergy.  The patient understands that this medication is being used in an off-label manner.
Bactrim Pregnancy And Lactation Text: This medication is Pregnancy Category D and is known to cause fetal risk.  It is also excreted in breast milk.
Detail Level: Simple
Metronidazole Pregnancy And Lactation Text: This medication is Pregnancy Category B and considered safe during pregnancy.  It is also excreted in breast milk.
Acitretin Pregnancy And Lactation Text: This medication is Pregnancy Category X and should not be given to women who are pregnant or may become pregnant in the future. This medication is excreted in breast milk.
Azathioprine Counseling:  I discussed with the patient the risks of azathioprine including but not limited to myelosuppression, immunosuppression, hepatotoxicity, lymphoma, and infections.  The patient understands that monitoring is required including baseline LFTs, Creatinine, possible TPMP genotyping and weekly CBCs for the first month and then every 2 weeks thereafter.  The patient verbalized understanding of the proper use and possible adverse effects of azathioprine.  All of the patient's questions and concerns were addressed.
Topical Sulfur Applications Counseling: Topical Sulfur Counseling: Patient counseled that this medication may cause skin irritation or allergic reactions.  In the event of skin irritation, the patient was advised to reduce the amount of the drug applied or use it less frequently.   The patient verbalized understanding of the proper use and possible adverse effects of topical sulfur application.  All of the patient's questions and concerns were addressed.
Carac Counseling:  I discussed with the patient the risks of Carac including but not limited to erythema, scaling, itching, weeping, crusting, and pain.
Hydroquinone Counseling:  Patient advised that medication may result in skin irritation, lightening (hypopigmentation), dryness, and burning.  In the event of skin irritation, the patient was advised to reduce the amount of the drug applied or use it less frequently.  Rarely, spots that are treated with hydroquinone can become darker (pseudoochronosis).  Should this occur, patient instructed to stop medication and call the office. The patient verbalized understanding of the proper use and possible adverse effects of hydroquinone.  All of the patient's questions and concerns were addressed.
Prednisone Counseling:  I discussed with the patient the risks of prolonged use of prednisone including but not limited to weight gain, insomnia, osteoporosis, mood changes, diabetes, susceptibility to infection, glaucoma and high blood pressure.  In cases where prednisone use is prolonged, patients should be monitored with blood pressure checks, serum glucose levels and an eye exam.  Additionally, the patient may need to be placed on GI prophylaxis, PCP prophylaxis, and calcium and vitamin D supplementation and/or a bisphosphonate.  The patient verbalized understanding of the proper use and the possible adverse effects of prednisone.  All of the patient's questions and concerns were addressed.
Protopic Pregnancy And Lactation Text: This medication is Pregnancy Category C. It is unknown if this medication is excreted in breast milk when applied topically.
SSKI Counseling:  I discussed with the patient the risks of SSKI including but not limited to thyroid abnormalities, metallic taste, GI upset, fever, headache, acne, arthralgias, paraesthesias, lymphadenopathy, easy bleeding, arrhythmias, and allergic reaction.
Albendazole Pregnancy And Lactation Text: This medication is Pregnancy Category C and it isn't known if it is safe during pregnancy. It is also excreted in breast milk.
Taltz Counseling: I discussed with the patient the risks of ixekizumab including but not limited to immunosuppression, serious infections, worsening of inflammatory bowel disease and drug reactions.  The patient understands that monitoring is required including a PPD at baseline and must alert us or the primary physician if symptoms of infection or other concerning signs are noted.
Fluconazole Counseling:  Patient counseled regarding adverse effects of fluconazole including but not limited to headache, diarrhea, nausea, upset stomach, liver function test abnormalities, taste disturbance, and stomach pain.  There is a rare possibility of liver failure that can occur when taking fluconazole.  The patient understands that monitoring of LFTs and kidney function test may be required, especially at baseline. The patient verbalized understanding of the proper use and possible adverse effects of fluconazole.  All of the patient's questions and concerns were addressed.
Arava Counseling:  Patient counseled regarding adverse effects of Arava including but not limited to nausea, vomiting, abnormalities in liver function tests. Patients may develop mouth sores, rash, diarrhea, and abnormalities in blood counts. The patient understands that monitoring is required including LFTs and blood counts.  There is a rare possibility of scarring of the liver and lung problems that can occur when taking methotrexate. Persistent nausea, loss of appetite, pale stools, dark urine, cough, and shortness of breath should be reported immediately. Patient advised to discontinue Arava treatment and consult with a physician prior to attempting conception. The patient will have to undergo a treatment to eliminate Arava from the body prior to conception.
Bexarotene Counseling:  I discussed with the patient the risks of bexarotene including but not limited to hair loss, dry lips/skin/eyes, liver abnormalities, hyperlipidemia, pancreatitis, depression/suicidal ideation, photosensitivity, drug rash/allergic reactions, hypothyroidism, anemia, leukopenia, infection, cataracts, and teratogenicity.  Patient understands that they will need regular blood tests to check lipid profile, liver function tests, white blood cell count, thyroid function tests and pregnancy test if applicable.
Cephalexin Counseling: I counseled the patient regarding use of cephalexin as an antibiotic for prophylactic and/or therapeutic purposes. Cephalexin (commonly prescribed under brand name Keflex) is a cephalosporin antibiotic which is active against numerous classes of bacteria, including most skin bacteria. Side effects may include nausea, diarrhea, gastrointestinal upset, rash, hives, yeast infections, and in rare cases, hepatitis, kidney disease, seizures, fever, confusion, neurologic symptoms, and others. Patients with severe allergies to penicillin medications are cautioned that there is about a 10% incidence of cross-reactivity with cephalosporins. When possible, patients with penicillin allergies should use alternatives to cephalosporins for antibiotic therapy.
Topical Sulfur Applications Pregnancy And Lactation Text: This medication is Pregnancy Category C and has an unknown safety profile during pregnancy. It is unknown if this topical medication is excreted in breast milk.
Minocycline Counseling: Patient advised regarding possible photosensitivity and discoloration of the teeth, skin, lips, tongue and gums.  Patient instructed to avoid sunlight, if possible.  When exposed to sunlight, patients should wear protective clothing, sunglasses, and sunscreen.  The patient was instructed to call the office immediately if the following severe adverse effects occur:  hearing changes, easy bruising/bleeding, severe headache, or vision changes.  The patient verbalized understanding of the proper use and possible adverse effects of minocycline.  All of the patient's questions and concerns were addressed.
Solaraze Counseling:  I discussed with the patient the risks of Solaraze including but not limited to erythema, scaling, itching, weeping, crusting, and pain.
Ivermectin Counseling:  Patient instructed to take medication on an empty stomach with a full glass of water.  Patient informed of potential adverse effects including but not limited to nausea, diarrhea, dizziness, itching, and swelling of the extremities or lymph nodes.  The patient verbalized understanding of the proper use and possible adverse effects of ivermectin.  All of the patient's questions and concerns were addressed.
Sski Pregnancy And Lactation Text: This medication is Pregnancy Category D and isn't considered safe during pregnancy. It is excreted in breast milk.
Dupixent Counseling: I discussed with the patient the risks of dupilumab including but not limited to eye infection and irritation, cold sores, injection site reactions, worsening of asthma, allergic reactions and increased risk of parasitic infection.  Live vaccines should be avoided while taking dupilumab. Dupilumab will also interact with certain medications such as warfarin and cyclosporine. The patient understands that monitoring is required and they must alert us or the primary physician if symptoms of infection or other concerning signs are noted.
Gabapentin Counseling: I discussed with the patient the risks of gabapentin including but not limited to dizziness, somnolence, fatigue and ataxia.
Cephalexin Pregnancy And Lactation Text: This medication is Pregnancy Category B and considered safe during pregnancy.  It is also excreted in breast milk but can be used safely for shorter doses.
Wartpeel Counseling:  I discussed with the patient the risks of Wartpeel including but not limited to erythema, scaling, itching, weeping, crusting, and pain.
Cellcept Counseling:  I discussed with the patient the risks of mycophenolate mofetil including but not limited to infection/immunosuppression, GI upset, hypokalemia, hypercholesterolemia, bone marrow suppression, lymphoproliferative disorders, malignancy, GI ulceration/bleed/perforation, colitis, interstitial lung disease, kidney failure, progressive multifocal leukoencephalopathy, and birth defects.  The patient understands that monitoring is required including a baseline creatinine and regular CBC testing. In addition, patient must alert us immediately if symptoms of infection or other concerning signs are noted.
Rituxan Counseling:  I discussed with the patient the risks of Rituxan infusions. Side effects can include infusion reactions, severe drug rashes including mucocutaneous reactions, reactivation of latent hepatitis and other infections and rarely progressive multifocal leukoencephalopathy.  All of the patient's questions and concerns were addressed.
Otezla Counseling: The side effects of Otezla were discussed with the patient, including but not limited to worsening or new depression, weight loss, diarrhea, nausea, upper respiratory tract infection, and headache. Patient instructed to call the office should any adverse effect occur.  The patient verbalized understanding of the proper use and possible adverse effects of Otezla.  All the patient's questions and concerns were addressed.
Cimetidine Counseling:  I discussed with the patient the risks of Cimetidine including but not limited to gynecomastia, headache, diarrhea, nausea, drowsiness, arrhythmias, pancreatitis, skin rashes, psychosis, bone marrow suppression and kidney toxicity.
Bexarotene Pregnancy And Lactation Text: This medication is Pregnancy Category X and should not be given to women who are pregnant or may become pregnant. This medication should not be used if you are breast feeding.
Thalidomide Counseling: I discussed with the patient the risks of thalidomide including but not limited to birth defects, anxiety, weakness, chest pain, dizziness, cough and severe allergy.
Solaraze Pregnancy And Lactation Text: This medication is Pregnancy Category B and is considered safe. There is some data to suggest avoiding during the third trimester. It is unknown if this medication is excreted in breast milk.
Tremfya Counseling: I discussed with the patient the risks of guselkumab including but not limited to immunosuppression, serious infections, worsening of inflammatory bowel disease and drug reactions.  The patient understands that monitoring is required including a PPD at baseline and must alert us or the primary physician if symptoms of infection or other concerning signs are noted.
5-Fu Counseling: 5-Fluorouracil Counseling:  I discussed with the patient the risks of 5-fluorouracil including but not limited to erythema, scaling, itching, weeping, crusting, and pain.
Dupixent Pregnancy And Lactation Text: This medication likely crosses the placenta but the risk for the fetus is uncertain. This medication is excreted in breast milk.
Rituxan Pregnancy And Lactation Text: This medication is Pregnancy Category C and it isn't know if it is safe during pregnancy. It is unknown if this medication is excreted in breast milk but similar antibodies are known to be excreted.
Imiquimod Counseling:  I discussed with the patient the risks of imiquimod including but not limited to erythema, scaling, itching, weeping, crusting, and pain.  Patient understands that the inflammatory response to imiquimod is variable from person to person and was educated regarded proper titration schedule.  If flu-like symptoms develop, patient knows to discontinue the medication and contact us.
Clindamycin Counseling: I counseled the patient regarding use of clindamycin as an antibiotic for prophylactic and/or therapeutic purposes. Clindamycin is active against numerous classes of bacteria, including skin bacteria. Side effects may include nausea, diarrhea, gastrointestinal upset, rash, hives, yeast infections, and in rare cases, colitis.

## 2018-11-29 ENCOUNTER — RX ONLY (OUTPATIENT)
Age: 48
Setting detail: RX ONLY
End: 2018-11-29

## 2018-11-29 RX ORDER — HYDROXYCHLOROQUINE SULFATE 200 MG/1
TABLET ORAL
Qty: 135 | Refills: 0 | Status: ERX | COMMUNITY
Start: 2018-11-29

## 2018-12-04 RX ORDER — CYCLOBENZAPRINE HCL 10 MG
10 TABLET ORAL 2 TIMES DAILY PRN
Qty: 180 TAB | Refills: 0 | Status: SHIPPED | OUTPATIENT
Start: 2018-12-04 | End: 2019-07-07

## 2018-12-04 NOTE — TELEPHONE ENCOUNTER
From: Bella Garibay  Sent: 12/3/2018 11:40 AM PST  Subject: Medication Renewal Request    Bella Garibay would like a refill of the following medications:     cyclobenzaprine (FLEXERIL) 10 MG Tab [Reny Dalton, A.P.N.]    Preferred pharmacy: [facility] [address] [place] [address] (), RN - [id number]    Comment:  This message is being sent by Ion Garibay on behalf of Bella Garibay

## 2019-02-19 RX ORDER — AMITRIPTYLINE HYDROCHLORIDE 150 MG/1
TABLET ORAL
Qty: 90 TAB | Refills: 3 | Status: SHIPPED | OUTPATIENT
Start: 2019-02-19 | End: 2020-03-10 | Stop reason: SDUPTHER

## 2019-02-19 NOTE — TELEPHONE ENCOUNTER
Was the patient seen in the last year in this department? Yes  8-    Does patient have an active prescription for medications requested? No     Received Request Via: Pharmacy

## 2019-03-08 ENCOUNTER — PATIENT MESSAGE (OUTPATIENT)
Dept: HEALTH INFORMATION MANAGEMENT | Facility: OTHER | Age: 49
End: 2019-03-08

## 2019-03-12 NOTE — TELEPHONE ENCOUNTER
Was the patient seen in the last year in this department? Yes  LOV 8/31/18  Does patient have an active prescription for medications requested? No     Received Request Via: Pharmacy

## 2019-03-13 RX ORDER — LEVOTHYROXINE SODIUM 175 UG/1
TABLET ORAL
Qty: 90 TAB | Refills: 1 | Status: SHIPPED | OUTPATIENT
Start: 2019-03-13 | End: 2019-09-01 | Stop reason: SDUPTHER

## 2019-03-14 ENCOUNTER — OFFICE VISIT (OUTPATIENT)
Dept: MEDICAL GROUP | Facility: LAB | Age: 49
End: 2019-03-14
Payer: COMMERCIAL

## 2019-03-14 VITALS
TEMPERATURE: 97.5 F | SYSTOLIC BLOOD PRESSURE: 100 MMHG | WEIGHT: 269 LBS | DIASTOLIC BLOOD PRESSURE: 78 MMHG | OXYGEN SATURATION: 94 % | HEIGHT: 65 IN | BODY MASS INDEX: 44.82 KG/M2 | RESPIRATION RATE: 12 BRPM | HEART RATE: 100 BPM

## 2019-03-14 DIAGNOSIS — J06.9 ACUTE URI: ICD-10-CM

## 2019-03-14 DIAGNOSIS — E03.4 HYPOTHYROIDISM DUE TO ACQUIRED ATROPHY OF THYROID: ICD-10-CM

## 2019-03-14 DIAGNOSIS — R41.840 ATTENTION DEFICIT: ICD-10-CM

## 2019-03-14 DIAGNOSIS — E23.0 HYPOPITUITARISM (HCC): ICD-10-CM

## 2019-03-14 DIAGNOSIS — E11.9 CONTROLLED TYPE 2 DIABETES MELLITUS WITHOUT COMPLICATION, WITHOUT LONG-TERM CURRENT USE OF INSULIN (HCC): ICD-10-CM

## 2019-03-14 DIAGNOSIS — E66.01 MORBID OBESITY WITH BMI OF 40.0-44.9, ADULT (HCC): ICD-10-CM

## 2019-03-14 PROCEDURE — 99214 OFFICE O/P EST MOD 30 MIN: CPT | Performed by: NURSE PRACTITIONER

## 2019-03-14 RX ORDER — PROMETHAZINE HYDROCHLORIDE AND CODEINE PHOSPHATE 6.25; 1 MG/5ML; MG/5ML
5-10 SYRUP ORAL 2 TIMES DAILY PRN
Qty: 140 ML | Refills: 0 | Status: SHIPPED
Start: 2019-03-14 | End: 2019-03-25 | Stop reason: SDUPTHER

## 2019-03-14 RX ORDER — AMOXICILLIN AND CLAVULANATE POTASSIUM 875; 125 MG/1; MG/1
1 TABLET, FILM COATED ORAL 2 TIMES DAILY
Qty: 14 TAB | Refills: 0 | Status: SHIPPED | OUTPATIENT
Start: 2019-03-14 | End: 2020-02-27 | Stop reason: SDUPTHER

## 2019-03-14 ASSESSMENT — PATIENT HEALTH QUESTIONNAIRE - PHQ9: CLINICAL INTERPRETATION OF PHQ2 SCORE: 0

## 2019-03-14 NOTE — ASSESSMENT & PLAN NOTE
"tx by Dr. Chowdary with somatropin - 0.8mg per day.  Tried estrogen patches for hot flashes but felt \"crazy.\"    "

## 2019-03-14 NOTE — PROGRESS NOTES
"Chief Complaint   Patient presents with   • Cough   • Pharyngitis     X 3 days   • Headache       HPI        Past medical, surgical, family, and social history is reviewed and updated in Epic chart by me today.   Medications and allergies reviewed and updated in Epic chart by me today.     ROS:   As documented in history of present illness above    Exam:  Blood pressure 100/78, pulse 100, temperature 36.4 °C (97.5 °F), resp. rate 12, height 1.651 m (5' 5\"), weight 122 kg (269 lb), SpO2 94 %, not currently breastfeeding.  Constitutional: Alert, no distress, plus 3 vital signs  Skin:  Warm, dry, no rashes invisible areas  Eye: Equal, round and reactive, conjunctiva clear  ENMT: Lips without lesions, good dentition, oropharynx clear    Neck: Trachea midline, no masses, no thyromegaly  Respiratory: Unlabored respiration, lungs clear to auscultation, no wheezes, no rhonchi  Cardiovascular: Normal rate and rhythm, no murmur, no edema  Abdomen: Soft, nontender, no masses or hepatosplenomegaly  Psych: Alert, pleasant, well-groomed, normal affect    Assessment:        Plan / Medical Decision making:     "

## 2019-03-14 NOTE — PROGRESS NOTES
"Chief Complaint   Patient presents with   • Cough   • Pharyngitis     X 3 days   • Headache       HPI   Tamime is a 47 yo est female here with c/o new onset ST, congestion, HA and cough x the past 4 d.  + sick contacts with 2 yr old granddaughter.  Symptoms seem to be worsening.  Has tried OTC cough / cold meds without improvement.  Mucus: Dark yellow / green.  Denies SOB or wheezing.  Nonsmoker.  No hx of asthma. Wk: .  + sick contacts.      She is also working with Dr. Urbina, endocrinology, for a diagnoses of \"panhypopituitarism.\"  She is prescribed somatropin that she injects subcutaneously every day.  She tells me that she really struggles with her weight, emotions, temperature regulation and her skin.   Injecting somatropin 0.8mg per day.  Tried estrogen patches for hot flashes but felt \"crazy.\"      Due for lab work for her thyroid and her diabetes, it is very expensive for her to see Dr. Thao she would like for me to order some lab work.  She continues on 175 mcg of levothyroxine as well as 25 mg of Jardiance for hypothyroidism and type 2 diabetes respectively.  Not checking BG.  Avoid all white carbs except for 1 cheat day every 1-2 weeks.  Walking for exercise when she can.     She is also concerned that she has attention deficit disorder.  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  She would like to discuss taking Adderall.    Past medical, surgical, family, and social history is reviewed and updated in Epic chart by me today.   Medications and allergies reviewed and updated in Epic chart by me today.     ROS:   Denies n/v/d or abdominal pain.     Exam:  Blood pressure 100/78, pulse 100, temperature 36.4 °C (97.5 °F), resp. rate 12, height 1.651 m (5' 5\"), weight 122 kg (269 lb), SpO2 94 %, not currently breastfeeding.    Constitutional: Alert, no distress, plus 3 vital signs  Skin:  Warm, dry, no rashes invisible areas  Eye: Equal, round and reactive, " "conjunctiva clear  ENMT: Bilateral tympanic membranes are retracted but translucent without erythema or perforation. Positive bilateral maxillary sinus tenderness. Oropharynx is slightly injected without exudate. No tonsillar enlargement.    Neck: Mild anterior cervical, nontender lymphadenopathy  Respiratory: Unlabored respiration, lungs clear to auscultation, no wheezes, no rhonchi  Cardiovascular: Normal rate and rhythm  Psych: Alert, pleasant, well-groomed, normal affect      A/P:  1. Hypopituitarism (HCC) - \"panhypopituitarism\"  -followed also by endo / somatropin rx by Dr. Chowdary.  Encouraged her to make a follow-up with him as he recommends.  - CORTISOL; Future    2. Hypothyroidism due to acquired atrophy of thyroid  -Recommend updated TSH and T4.  - TSH; Future  - FREE THYROXINE; Future    3. Controlled type 2 diabetes mellitus without complication, without long-term current use of insulin (HCC)  -Last A1c was doing really well below 6.5.  She will continue to avoid white carbohydrates and on Jardiance.  Recommend updated A1c.  Discussed the importance of seeing her eye doctor yearly.  Discussed Eye exam once yearly -   - Discussed Dental exam once yearly -   - Discussed A1C target below 7.5%  - Discussed CMP and A1C evaluation every 6 months  - Discussed Lipid and spot urine albumin to Cr ratio once per year at minimum  - HEMOGLOBIN A1C; Future    4. Attention deficit  -I encouraged her to obtain a diagnoses of attention deficit disorder initially from psychiatry and a referral was placed.  She has been on Wellbutrin for quite some time and we briefly discussed that Wellbutrin can help a bit with attention deficit.  - REFERRAL TO PSYCHIATRY    5. Acute URI  -Suspect she has a sinus infection.  Recommend starting Augmentin twice daily for a week.  She requested promethazine with codeine cough syrup for nighttime coughing as she states that she is unable to sleep because of her cough.  She understands not " to drive a car or drink alcohol within 4 hours of using promethazine with codeine cough syrup.  Encouraged her to follow-up with me in 5 days if symptoms are not improving, within 10 if symptoms have not resolved.  - amoxicillin-clavulanate (AUGMENTIN) 875-125 MG Tab; Take 1 Tab by mouth 2 times a day for 7 days.  Dispense: 14 Tab; Refill: 0  - promethazine-codeine (PHENERGAN-CODEINE) 6.25-10 MG/5ML Syrup; Take 5-10 mL by mouth 2 times a day as needed for Cough for up to 7 days.  Dispense: 140 mL; Refill: 0    6. Morbid obesity with BMI of 40.0-44.9, adult (HCC)  -Persistent issue.  She is trying hard with her weight and exercise.  She tells me that her biggest weight struggle is her hypopituitary issues.

## 2019-03-25 DIAGNOSIS — J06.9 ACUTE URI: ICD-10-CM

## 2019-03-25 RX ORDER — PROMETHAZINE HYDROCHLORIDE AND CODEINE PHOSPHATE 6.25; 1 MG/5ML; MG/5ML
5-10 SYRUP ORAL 2 TIMES DAILY PRN
Qty: 140 ML | Refills: 0 | Status: SHIPPED
Start: 2019-03-25 | End: 2020-02-27 | Stop reason: SDUPTHER

## 2019-04-01 ENCOUNTER — OFFICE VISIT (OUTPATIENT)
Dept: BEHAVIORAL HEALTH | Facility: CLINIC | Age: 49
End: 2019-04-01
Payer: COMMERCIAL

## 2019-04-01 VITALS
HEIGHT: 65 IN | BODY MASS INDEX: 45.82 KG/M2 | WEIGHT: 275 LBS | HEART RATE: 62 BPM | DIASTOLIC BLOOD PRESSURE: 88 MMHG | SYSTOLIC BLOOD PRESSURE: 122 MMHG

## 2019-04-01 DIAGNOSIS — F33.2 SEVERE EPISODE OF RECURRENT MAJOR DEPRESSIVE DISORDER, WITHOUT PSYCHOTIC FEATURES (HCC): ICD-10-CM

## 2019-04-01 DIAGNOSIS — F90.2 ATTENTION DEFICIT HYPERACTIVITY DISORDER (ADHD), COMBINED TYPE: ICD-10-CM

## 2019-04-01 DIAGNOSIS — F43.12 CHRONIC POST-TRAUMATIC STRESS DISORDER (PTSD): ICD-10-CM

## 2019-04-01 PROCEDURE — 99205 OFFICE O/P NEW HI 60 MIN: CPT | Performed by: PSYCHIATRY & NEUROLOGY

## 2019-04-01 RX ORDER — ALPRAZOLAM 1 MG/1
1 TABLET ORAL 3 TIMES DAILY PRN
COMMUNITY
End: 2019-06-28 | Stop reason: SDUPTHER

## 2019-04-01 RX ORDER — LAMOTRIGINE 25 MG/1
TABLET ORAL
Qty: 60 TAB | Refills: 1 | Status: SHIPPED | OUTPATIENT
Start: 2019-04-01 | End: 2019-07-04 | Stop reason: SDUPTHER

## 2019-04-01 ASSESSMENT — PATIENT HEALTH QUESTIONNAIRE - PHQ9
5. POOR APPETITE OR OVEREATING: 3 - NEARLY EVERY DAY
CLINICAL INTERPRETATION OF PHQ2 SCORE: 4
SUM OF ALL RESPONSES TO PHQ QUESTIONS 1-9: 22
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
## 2019-04-08 DIAGNOSIS — F51.01 PRIMARY INSOMNIA: ICD-10-CM

## 2019-04-08 DIAGNOSIS — K59.09 OTHER CONSTIPATION: ICD-10-CM

## 2019-04-08 RX ORDER — AMITRIPTYLINE HYDROCHLORIDE 50 MG/1
TABLET, FILM COATED ORAL
Qty: 90 TAB | Refills: 3 | Status: SHIPPED | OUTPATIENT
Start: 2019-04-08 | End: 2019-12-10 | Stop reason: SDUPTHER

## 2019-04-08 NOTE — TELEPHONE ENCOUNTER
Was the patient seen in the last year in this department? Yes LOV: 3/14/19    Does patient have an active prescription for medications requested? No     Received Request Via: Pharmacy

## 2019-04-08 NOTE — TELEPHONE ENCOUNTER
Was the patient seen in the last year in this department? Yes  3/14/19  Does patient have an active prescription for medications requested? No     Received Request Via: Pharmacy

## 2019-04-11 ENCOUNTER — TELEPHONE (OUTPATIENT)
Dept: BEHAVIORAL HEALTH | Facility: CLINIC | Age: 49
End: 2019-04-11

## 2019-04-13 ENCOUNTER — HOSPITAL ENCOUNTER (OUTPATIENT)
Facility: MEDICAL CENTER | Age: 49
End: 2019-04-13
Attending: NURSE PRACTITIONER
Payer: COMMERCIAL

## 2019-04-13 DIAGNOSIS — E11.9 CONTROLLED TYPE 2 DIABETES MELLITUS WITHOUT COMPLICATION, WITHOUT LONG-TERM CURRENT USE OF INSULIN (HCC): ICD-10-CM

## 2019-04-13 DIAGNOSIS — E23.0 HYPOPITUITARISM (HCC): ICD-10-CM

## 2019-04-13 DIAGNOSIS — E03.4 HYPOTHYROIDISM DUE TO ACQUIRED ATROPHY OF THYROID: ICD-10-CM

## 2019-04-13 LAB
CORTIS SERPL-MCNC: 10 UG/DL (ref 0–23)
EST. AVERAGE GLUCOSE BLD GHB EST-MCNC: 111 MG/DL
HBA1C MFR BLD: 5.5 % (ref 0–5.6)
T4 FREE SERPL-MCNC: 0.99 NG/DL (ref 0.53–1.43)
TSH SERPL DL<=0.005 MIU/L-ACNC: 0.31 UIU/ML (ref 0.38–5.33)

## 2019-04-13 PROCEDURE — 82533 TOTAL CORTISOL: CPT

## 2019-04-13 PROCEDURE — 84439 ASSAY OF FREE THYROXINE: CPT

## 2019-04-13 PROCEDURE — 84443 ASSAY THYROID STIM HORMONE: CPT

## 2019-04-13 PROCEDURE — 83036 HEMOGLOBIN GLYCOSYLATED A1C: CPT

## 2019-04-19 ENCOUNTER — APPOINTMENT (OUTPATIENT)
Dept: BEHAVIORAL HEALTH | Facility: CLINIC | Age: 49
End: 2019-04-19
Payer: COMMERCIAL

## 2019-05-15 ENCOUNTER — APPOINTMENT (OUTPATIENT)
Dept: BEHAVIORAL HEALTH | Facility: CLINIC | Age: 49
End: 2019-05-15
Payer: COMMERCIAL

## 2019-06-28 DIAGNOSIS — F41.1 GAD (GENERALIZED ANXIETY DISORDER): ICD-10-CM

## 2019-07-01 RX ORDER — ALPRAZOLAM 1 MG/1
1 TABLET ORAL 3 TIMES DAILY PRN
Qty: 90 TAB | Refills: 0 | Status: SHIPPED
Start: 2019-07-01 | End: 2019-07-27

## 2019-07-05 RX ORDER — LAMOTRIGINE 25 MG/1
TABLET ORAL
Qty: 60 TAB | Refills: 1 | Status: SHIPPED | OUTPATIENT
Start: 2019-07-05 | End: 2019-07-27

## 2019-07-07 ENCOUNTER — OFFICE VISIT (OUTPATIENT)
Dept: URGENT CARE | Facility: PHYSICIAN GROUP | Age: 49
End: 2019-07-07
Payer: COMMERCIAL

## 2019-07-07 VITALS
OXYGEN SATURATION: 96 % | DIASTOLIC BLOOD PRESSURE: 76 MMHG | WEIGHT: 275 LBS | TEMPERATURE: 98.2 F | SYSTOLIC BLOOD PRESSURE: 124 MMHG | HEART RATE: 76 BPM | BODY MASS INDEX: 45.82 KG/M2 | HEIGHT: 65 IN | RESPIRATION RATE: 14 BRPM

## 2019-07-07 DIAGNOSIS — M62.838 NECK MUSCLE SPASM: ICD-10-CM

## 2019-07-07 PROCEDURE — 99214 OFFICE O/P EST MOD 30 MIN: CPT | Performed by: FAMILY MEDICINE

## 2019-07-07 RX ORDER — MELOXICAM 15 MG/1
15 TABLET ORAL DAILY
Qty: 30 TAB | Refills: 0 | Status: SHIPPED | OUTPATIENT
Start: 2019-07-07 | End: 2019-07-27

## 2019-07-07 ASSESSMENT — PAIN SCALES - GENERAL: PAINLEVEL: 7=MODERATE-SEVERE PAIN

## 2019-07-07 ASSESSMENT — ENCOUNTER SYMPTOMS
TINGLING: 0
NUMBNESS: 0
WEAKNESS: 0
FEVER: 0
NECK PAIN: 1
HEADACHES: 1

## 2019-07-07 NOTE — PROGRESS NOTES
"Subjective:   Bella Garibay is a 48 y.o. female who presents for Neck Pain (x2days/ radiating down shoulders and neck )        Neck Pain    This is a new problem. The current episode started in the past 7 days. The problem occurs constantly. The problem has been gradually worsening. The pain is associated with a sleep position. The pain is present in the right side. The quality of the pain is described as aching and cramping. The pain is moderate. Associated symptoms include headaches. Pertinent negatives include no fever, numbness, tingling or weakness.     Review of Systems   Constitutional: Negative for fever.   Musculoskeletal: Positive for neck pain.   Neurological: Positive for headaches. Negative for tingling, weakness and numbness.     No Known Allergies   Objective:   /76 (BP Location: Left arm, Patient Position: Sitting, BP Cuff Size: Adult)   Pulse 76   Temp 36.8 °C (98.2 °F) (Temporal)   Resp 14   Ht 1.651 m (5' 5\")   Wt 124.7 kg (275 lb)   SpO2 96%   BMI 45.76 kg/m²   Physical Exam   Constitutional: She is oriented to person, place, and time. She appears well-developed and well-nourished. No distress.   HENT:   Head: Normocephalic and atraumatic.   Eyes: Pupils are equal, round, and reactive to light. Conjunctivae and EOM are normal.   Cardiovascular: Normal rate and regular rhythm.    No murmur heard.  Pulmonary/Chest: Effort normal and breath sounds normal. No respiratory distress.   Abdominal: Soft. She exhibits no distension. There is no tenderness.   Musculoskeletal:        Cervical back: She exhibits decreased range of motion, tenderness and spasm. She exhibits no bony tenderness and no swelling.   Neurological: She is alert and oriented to person, place, and time. She has normal reflexes. No sensory deficit.   Skin: Skin is warm and dry.   Psychiatric: She has a normal mood and affect.         Assessment/Plan:   1. Neck muscle spasm  - meloxicam (MOBIC) 15 MG tablet; Take 1 " Tab by mouth every day.  Dispense: 30 Tab; Refill: 0  Continue current Flexeril prescription as discussed.  Differential diagnosis, natural history, supportive care, and indications for immediate follow-up discussed.

## 2019-07-07 NOTE — PATIENT INSTRUCTIONS
Cervical Sprain  A cervical sprain is a stretch or tear in one or more of the tough, cord-like tissues that connect bones (ligaments) in the neck. Cervical sprains can range from mild to severe. Severe cervical sprains can cause the spinal bones (vertebrae) in the neck to be unstable. This can lead to spinal cord damage and can result in serious nervous system problems.  The amount of time that it takes for a cervical sprain to get better depends on the cause and extent of the injury. Most cervical sprains heal in 4-6 weeks.  What are the causes?  Cervical sprains may be caused by an injury (trauma), such as from a motor vehicle accident, a fall, or sudden forward and backward whipping movement of the head and neck (whiplash injury).  Mild cervical sprains may be caused by wear and tear over time, such as from poor posture, sitting in a chair that does not provide support, or looking up or down for long periods of time.  What increases the risk?  The following factors may make you more likely to develop this condition:  · Participating in activities that have a high risk of trauma to the neck. These include contact sports, auto racing, gymnastics, and diving.  · Taking risks when driving or riding in a motor vehicle, such as speeding.  · Having osteoarthritis of the spine.  · Having poor strength and flexibility of the neck.  · A previous neck injury.  · Having poor posture.  · Spending a lot of time in certain positions that put stress on the neck, such as sitting at a computer for long periods of time.  What are the signs or symptoms?  Symptoms of this condition include:  · Pain, soreness, stiffness, tenderness, swelling, or a burning sensation in the front, back, or sides of the neck.  · Sudden tightening of neck muscles that you cannot control (muscle spasms).  · Pain in the shoulders or upper back.  · Limited ability to move the neck.  · Headache.  · Dizziness.  · Nausea.  · Vomiting.  · Weakness, numbness, or  tingling in a hand or an arm.  Symptoms may develop right away after injury, or they may develop over a few days. In some cases, symptoms may go away with treatment and return (recur) over time.  How is this diagnosed?  This condition may be diagnosed based on:  · Your medical history.  · Your symptoms.  · Any recent injuries or known neck problems that you have, such as arthritis in the neck.  · A physical exam.  · Imaging tests, such as:  ¨ X-rays.  ¨ MRI.  ¨ CT scan.  How is this treated?  This condition is treated by resting and icing the injured area and doing physical therapy exercises. Depending on the severity of your condition, treatment may also include:  · Keeping your neck in place (immobilized) for periods of time. This may be done using:  ¨ A cervical collar. This supports your chin and the back of your head.  ¨ A cervical traction device. This is a sling that holds up your head. This removes weight and pressure from your neck, and it may help to relieve pain.  · Medicines that help to relieve pain and inflammation.  · Medicines that help to relax your muscles (muscle relaxants).  · Surgery. This is rare.  Follow these instructions at home:  If you have a cervical collar:  · Wear it as told by your health care provider. Do not remove the collar unless instructed by your health care provider.  · Ask your health care provider before you make any adjustments to your collar.  · If you have long hair, keep it outside of the collar.  · Ask your health care provider if you can remove the collar for cleaning and bathing. If you are allowed to remove the collar for cleaning or bathing:  ¨ Follow instructions from your health care provider about how to remove the collar safely.  ¨ Clean the collar by wiping it with mild soap and water and drying it completely.  ¨ If your collar has removable pads, remove them every 1-2 days and wash them by hand with soap and water. Let them air-dry completely before you put  them back in the collar.  ¨ Check your skin under the collar for irritation or sores. If you see any, tell your health care provider.  Managing pain, stiffness, and swelling  · If directed, use a cervical traction device as told by your health care provider.  · If directed, apply heat to the affected area before you do your physical therapy or as often as told by your health care provider. Use the heat source that your health care provider recommends, such as a moist heat pack or a heating pad.  ¨ Place a towel between your skin and the heat source.  ¨ Leave the heat on for 20-30 minutes.  ¨ Remove the heat if your skin turns bright red. This is especially important if you are unable to feel pain, heat, or cold. You may have a greater risk of getting burned.  · If directed, put ice on the affected area:  ¨ Put ice in a plastic bag.  ¨ Place a towel between your skin and the bag.  ¨ Leave the ice on for 20 minutes, 2-3 times a day.  Activity  · Do not drive while wearing a cervical collar. If you do not have a cervical collar, ask your health care provider if it is safe to drive while your neck heals.  · Do not drive or use heavy machinery while taking prescription pain medicine or muscle relaxants, unless your health care provider approves.  · Do not lift anything that is heavier than 10 lb (4.5 kg) until your health care provider tells you that it is safe.  · Rest as directed by your health care provider. Avoid positions and activities that make your symptoms worse. Ask your health care provider what activities are safe for you.  · If physical therapy was prescribed, do exercises as told by your health care provider or physical therapist.  General instructions  · Take over-the-counter and prescription medicines only as told by your health care provider.  · Do not use any products that contain nicotine or tobacco, such as cigarettes and e-cigarettes. These can delay healing. If you need help quitting, ask your  health care provider.  · Keep all follow-up visits as told by your health care provider or physical therapist. This is important.  How is this prevented?  To prevent a cervical sprain from happening again:  · Use and maintain good posture. Make any needed adjustments to your workstation to help you use good posture.  · Exercise regularly as directed by your health care provider or physical therapist.  · Avoid risky activities that may cause a cervical sprain.  Contact a health care provider if:  · You have symptoms that get worse or do not get better after 2 weeks of treatment.  · You have pain that gets worse or does not get better with medicine.  · You develop new, unexplained symptoms.  · You have sores or irritated skin on your neck from wearing your cervical collar.  Get help right away if:  · You have severe pain.  · You develop numbness, tingling, or weakness in any part of your body.  · You cannot move a part of your body (you have paralysis).  · You have neck pain along with:  ¨ Severe dizziness.  ¨ Headache.  Summary  · A cervical sprain is a stretch or tear in one or more of the tough, cord-like tissues that connect bones (ligaments) in the neck.  · Cervical sprains may be caused by an injury (trauma), such as from a motor vehicle accident, a fall, or sudden forward and backward whipping movement of the head and neck (whiplash injury).  · Symptoms may develop right away after injury, or they may develop over a few days.  · This condition is treated by resting and icing the injured area and doing physical therapy exercises.  This information is not intended to replace advice given to you by your health care provider. Make sure you discuss any questions you have with your health care provider.  Document Released: 10/14/2008 Document Revised: 08/16/2017 Document Reviewed: 08/16/2017  [employer] Interactive Patient Education © 2017 [employer]

## 2019-07-27 ENCOUNTER — HOSPITAL ENCOUNTER (OUTPATIENT)
Facility: MEDICAL CENTER | Age: 49
End: 2019-07-27
Attending: PHYSICIAN ASSISTANT
Payer: COMMERCIAL

## 2019-07-27 ENCOUNTER — OFFICE VISIT (OUTPATIENT)
Dept: URGENT CARE | Facility: CLINIC | Age: 49
End: 2019-07-27
Payer: COMMERCIAL

## 2019-07-27 VITALS
WEIGHT: 279 LBS | SYSTOLIC BLOOD PRESSURE: 108 MMHG | OXYGEN SATURATION: 94 % | RESPIRATION RATE: 12 BRPM | HEART RATE: 80 BPM | TEMPERATURE: 96.8 F | HEIGHT: 65 IN | DIASTOLIC BLOOD PRESSURE: 78 MMHG | BODY MASS INDEX: 46.48 KG/M2

## 2019-07-27 DIAGNOSIS — N30.01 ACUTE CYSTITIS WITH HEMATURIA: ICD-10-CM

## 2019-07-27 LAB
AMBIGUOUS DTTM AMBI4: NORMAL
APPEARANCE UR: NORMAL
BILIRUB UR STRIP-MCNC: NEGATIVE MG/DL
COLOR UR AUTO: YELLOW
GLUCOSE UR STRIP.AUTO-MCNC: 250 MG/DL
KETONES UR STRIP.AUTO-MCNC: NEGATIVE MG/DL
LEUKOCYTE ESTERASE UR QL STRIP.AUTO: NORMAL
NITRITE UR QL STRIP.AUTO: NEGATIVE
PH UR STRIP.AUTO: 7 [PH] (ref 5–8)
PROT UR QL STRIP: NEGATIVE MG/DL
RBC UR QL AUTO: NORMAL
SP GR UR STRIP.AUTO: 1
UROBILINOGEN UR STRIP-MCNC: 0.2 MG/DL

## 2019-07-27 PROCEDURE — 81002 URINALYSIS NONAUTO W/O SCOPE: CPT | Performed by: PHYSICIAN ASSISTANT

## 2019-07-27 PROCEDURE — 87086 URINE CULTURE/COLONY COUNT: CPT

## 2019-07-27 PROCEDURE — 99214 OFFICE O/P EST MOD 30 MIN: CPT | Performed by: PHYSICIAN ASSISTANT

## 2019-07-27 RX ORDER — NITROFURANTOIN 25; 75 MG/1; MG/1
100 CAPSULE ORAL EVERY 12 HOURS
Qty: 10 CAP | Refills: 0 | Status: SHIPPED | OUTPATIENT
Start: 2019-07-27 | End: 2019-08-01

## 2019-07-27 NOTE — PROGRESS NOTES
"Chief Complaint   Patient presents with   • UTI     x 1 day.  Pain with urination, urgency and frequency. Pt. gets frequent UTI's.        HISTORY OF PRESENT ILLNESS: Patient is a 48 y.o. female who presents today for about 12 hours of UTI symptoms.  Dysuria, frequency and urgency. Hx of UTIs and does have a standing rx for macrobid but does not have enough moving forward.  No fevers, chills, nausea, vomiting, diarrhea or flank pain.  She does take Jardiance for her currently well controlled DM 2 and for other endocrine benefits as directed by specialist.     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    Current Outpatient Prescriptions Ordered in [facility]   Medication Sig Dispense Refill   • ALPRAZolam (XANAX) 1 MG Tab TAKE 1 TABLET BY MOUTH THREE TIMES DAILY AS NEEDED FOR ANXIETY FOR  UP  TO  30  DAYS  (DX  F41.1) 90 Tab 3   • amitriptyline (ELAVIL) 50 MG Tab TAKE ONE TO TWO TABLETS BY MOUTH AT BEDTIME AS NEEDED FOR SLEEP 90 Tab 3   • levothyroxine (SYNTHROID) 175 MCG Tab TAKE 1 TABLET BY MOUTH ONCE DAILY 90 Tab 1   • amitriptyline (ELAVIL) 150 MG Tab TAKE ONE TABLET BY MOUTH AT BEDTIME AS NEEDED FOR SLEEP 90 Tab 3   • buPROPion (WELLBUTRIN XL) 150 MG XL tablet TAKE 1 TABLET BY MOUTH ONCE DAILY IN THE MORNING 90 Tab 3   • cyanocobalamin (VITAMIN B-12) 1000 MCG/ML Solution      • Empagliflozin (JARDIANCE) 25 MG Tab Take 1 Tab by mouth every day. 30 Tab 4   • " "Somatropin (GENOTROPIN) 0.2 MG SOLR Inject 4 mg as instructed. 116 Each    • Garcinia Cambogia-Chromium 500-200 MG-MCG TABS Take  by mouth.       No current Epic-ordered facility-administered medications on file.        Past Medical History:   Diagnosis Date   • Bronchitis 2014   • Early menopause     @ 39   • Hypothyroid 2009   • Obesity 2009       Social History   Substance Use Topics   • Smoking status: Former Smoker     Packs/day: 1.00     Quit date: 2013   • Smokeless tobacco: Never Used   • Alcohol use No      Comment: occ       Family Status   Relation Status   • Mo  at age 60   • MUnc Alive   • MAunt Alive   • Sis Alive   • Bro Alive   • MGMo (Not Specified)     Family History   Problem Relation Age of Onset   • Alcohol/Drug Mother         accidental overdose   • Cancer Mother         breast   • Cancer Maternal Uncle         liver   • Cancer Maternal Aunt         breast   • Cancer Maternal Grandmother         breast       ROS:  Review of Systems   Constitutional: Negative for fever, chills, weight loss and malaise/fatigue.   HENT: Negative for ear pain, nosebleeds, congestion, sore throat and neck pain.    Eyes: Negative for blurred vision.   Respiratory: Negative for cough, sputum production, shortness of breath and wheezing.    Cardiovascular: Negative for chest pain, palpitations, orthopnea and leg swelling.   Gastrointestinal: Negative for heartburn, nausea, vomiting and abdominal pain.   Genitourinary: SEE HPI    Exam:  /78   Pulse 80   Temp 36 °C (96.8 °F) (Temporal)   Resp 12   Ht 1.651 m (5' 5\")   Wt (!) 126.6 kg (279 lb)   SpO2 94%   General:  Well nourished, well developed female in NAD  Eyes: PERRLA, EOM within normal limits, no conjunctival injection, no scleral icterus, visual fields and acuity grossly intact.  Mouth: reasonable hygiene, no erythema exudates or tonsillar enlargement.  Neck: no masses, range of motion within normal limits, no tracheal " deviation. No lymphadenopathy  Pulmonary: Normal respiratory effort, no wheezes, crackles, or rhonchi.  Cardiovascular: regular rate and rhythm without murmurs, rubs, or gallops.  Abdomen: Soft, nontender, nondistended. Normal bowel sounds. No hepatosplenomegaly or masses, or hernias. No rebound or guarding. No CVA tenderness.   Skin: No visible rashes or lesion. Warm, pink, dry.   Extremities: no clubbing, cyanosis, or edema.  Neuro: A&O x 3. Speech normal/clear.  Normal gait.         Assessment/Plan:  1. Acute cystitis with hematuria  nitrofurantoin monohyd macro (MACROBID) 100 MG Cap    Urine Culture    POCT Urinalysis         -U/A with blood, leuks.   -culture sent  -Fluids emphasized.  Void before/after intercourse.  Recommend abstain until treatment complete/symptoms resolved.   -signs and symptoms of worsening/ascending infection discussed and to seek prompt medical care should they arise.        Supportive care, differential diagnoses, and indications for immediate follow-up discussed with patient.   Pathogenesis of diagnosis discussed including typical length and natural progression.   Instructed to return to clinic or nearest emergency department for any change in condition, further concerns, or worsening of symptoms.  Patient states understanding of the plan of care and discharge instructions.        Dayana Colon P.A.-C.

## 2019-07-29 LAB
BACTERIA UR CULT: NORMAL
SIGNIFICANT IND 70042: NORMAL
SITE SITE: NORMAL
SOURCE SOURCE: NORMAL

## 2019-08-01 ENCOUNTER — HOSPITAL ENCOUNTER (OUTPATIENT)
Facility: MEDICAL CENTER | Age: 49
End: 2019-08-01
Attending: NURSE PRACTITIONER
Payer: COMMERCIAL

## 2019-08-01 ENCOUNTER — OFFICE VISIT (OUTPATIENT)
Dept: MEDICAL GROUP | Facility: LAB | Age: 49
End: 2019-08-01
Payer: COMMERCIAL

## 2019-08-01 VITALS
BODY MASS INDEX: 45.82 KG/M2 | HEART RATE: 84 BPM | HEIGHT: 65 IN | SYSTOLIC BLOOD PRESSURE: 120 MMHG | DIASTOLIC BLOOD PRESSURE: 70 MMHG | RESPIRATION RATE: 12 BRPM | WEIGHT: 275 LBS | OXYGEN SATURATION: 95 % | TEMPERATURE: 96.1 F

## 2019-08-01 DIAGNOSIS — N76.0 ACUTE VAGINITIS: ICD-10-CM

## 2019-08-01 DIAGNOSIS — G44.221 CHRONIC TENSION-TYPE HEADACHE, INTRACTABLE: ICD-10-CM

## 2019-08-01 DIAGNOSIS — E66.9 OBESITY: ICD-10-CM

## 2019-08-01 DIAGNOSIS — E11.9 CONTROLLED TYPE 2 DIABETES MELLITUS WITHOUT COMPLICATION, WITHOUT LONG-TERM CURRENT USE OF INSULIN (HCC): ICD-10-CM

## 2019-08-01 DIAGNOSIS — L92.0 GRANULOMA ANNULARE: ICD-10-CM

## 2019-08-01 DIAGNOSIS — R81 GLUCOSE FOUND IN URINE ON EXAMINATION: ICD-10-CM

## 2019-08-01 DIAGNOSIS — R35.0 URINARY FREQUENCY: ICD-10-CM

## 2019-08-01 LAB
APPEARANCE UR: CLEAR
BILIRUB UR STRIP-MCNC: NEGATIVE MG/DL
COLOR UR AUTO: YELLOW
GLUCOSE UR STRIP.AUTO-MCNC: 500 MG/DL
HBA1C MFR BLD: 5.4 % (ref 0–5.6)
INT CON NEG: NEGATIVE
INT CON POS: POSITIVE
KETONES UR STRIP.AUTO-MCNC: NEGATIVE MG/DL
LEUKOCYTE ESTERASE UR QL STRIP.AUTO: NEGATIVE
NITRITE UR QL STRIP.AUTO: NEGATIVE
PH UR STRIP.AUTO: 7 [PH] (ref 5–8)
PROT UR QL STRIP: NEGATIVE MG/DL
RBC UR QL AUTO: NEGATIVE
SP GR UR STRIP.AUTO: 1.01
UROBILINOGEN UR STRIP-MCNC: 0.2 MG/DL

## 2019-08-01 PROCEDURE — 81002 URINALYSIS NONAUTO W/O SCOPE: CPT | Performed by: NURSE PRACTITIONER

## 2019-08-01 PROCEDURE — 87510 GARDNER VAG DNA DIR PROBE: CPT

## 2019-08-01 PROCEDURE — 87480 CANDIDA DNA DIR PROBE: CPT

## 2019-08-01 PROCEDURE — 83036 HEMOGLOBIN GLYCOSYLATED A1C: CPT | Performed by: NURSE PRACTITIONER

## 2019-08-01 PROCEDURE — 96372 THER/PROPH/DIAG INJ SC/IM: CPT | Performed by: NURSE PRACTITIONER

## 2019-08-01 PROCEDURE — 99215 OFFICE O/P EST HI 40 MIN: CPT | Mod: 25 | Performed by: NURSE PRACTITIONER

## 2019-08-01 PROCEDURE — 87660 TRICHOMONAS VAGIN DIR PROBE: CPT

## 2019-08-01 RX ORDER — KETOROLAC TROMETHAMINE 30 MG/ML
60 INJECTION, SOLUTION INTRAMUSCULAR; INTRAVENOUS ONCE
Status: COMPLETED | OUTPATIENT
Start: 2019-08-01 | End: 2019-08-01

## 2019-08-01 RX ORDER — BUTALBITAL, ASPIRIN, AND CAFFEINE 325; 50; 40 MG/1; MG/1; MG/1
1 CAPSULE ORAL EVERY 6 HOURS PRN
Qty: 30 CAP | Refills: 1 | Status: SHIPPED
Start: 2019-08-01 | End: 2020-03-10 | Stop reason: SDUPTHER

## 2019-08-01 RX ORDER — FLUCONAZOLE 150 MG/1
150 TABLET ORAL
Qty: 8 TAB | Refills: 0 | Status: SHIPPED | OUTPATIENT
Start: 2019-08-01 | End: 2020-07-30

## 2019-08-01 RX ORDER — CYCLOBENZAPRINE HCL 10 MG
10 TABLET ORAL 2 TIMES DAILY PRN
Qty: 180 TAB | Refills: 1 | Status: SHIPPED | OUTPATIENT
Start: 2019-08-01 | End: 2020-11-04

## 2019-08-01 RX ORDER — PHENTERMINE HYDROCHLORIDE 15 MG/1
15 CAPSULE ORAL EVERY MORNING
Qty: 30 CAP | Refills: 1 | Status: SHIPPED
Start: 2019-08-01 | End: 2020-03-10 | Stop reason: SDUPTHER

## 2019-08-01 RX ADMIN — KETOROLAC TROMETHAMINE 60 MG: 30 INJECTION, SOLUTION INTRAMUSCULAR; INTRAVENOUS at 15:02

## 2019-08-01 NOTE — LETTER
[facility]  Reny Dalton, A.P.N.  [address] [address] [facility] [address]  [place] [address]  Fax: [phone]   Authorization for Release/Disclosure of   Protected Health Information   Name: CARLOS KING : 1970 SSN: [id number]   Address: [address] [place], [place] [address]  [place] [address] Phone:    [phone] (home)    I authorize the entity listed below to release/disclose the PHI below to:   [facility]/Reny Dalton, A.P.N. and Reny Dalton, A.P.N.   Provider or Entity Name:  Dr. [facility]   Address   City, [facility]   Phone:      Fax:     Reason for request: continuity of care   Information to be released:    [  ] LAST COLONOSCOPY,  including any PATH REPORT and follow-up  [  ] LAST FIT/COLOGUARD RESULT [  ] LAST DEXA  [  ] LAST MAMMOGRAM  [  ] LAST PAP  [  ] LAST LABS [  ] RETINA EXAM REPORT  [  ] IMMUNIZATION RECORDS  [ x ] Release all info      [  ] Check here and initial the line next to each item to release ALL health information INCLUDING  _____ Care and treatment for drug and / or alcohol abuse  _____ HIV testing, infection status, or AIDS  _____ Genetic Testing    DATES OF SERVICE OR TIME PERIOD TO BE DISCLOSED: _____________  I understand and acknowledge that:  * This Authorization may be revoked at any time by you in writing, except if your health information has already been used or disclosed.  * Your health information that will be used or disclosed as a result of you signing this authorization could be re-disclosed by the recipient. If this occurs, your re-disclosed health information may no longer be protected by State or Federal laws.  * You may refuse to sign this Authorization. Your refusal will not affect your ability to obtain treatment.  * This Authorization becomes effective upon signing and will  on (date) __________.      If no date is indicated, this Authorization will  one (1) year from the signature date.    Name: Carlos Fagan  [place]    Signature:   Date:     8/1/2019       PLEASE FAX REQUESTED RECORDS BACK TO: [phone]

## 2019-08-01 NOTE — PROGRESS NOTES
Chief Complaint   Patient presents with   • Urinary Frequency     pressure/ cloudy/        HPI  Tammie is a 49 yo est female here with several concerns:  1.-Vaginal discomfort: This is been an ongoing issue for the past 1-1/2 to 2 years.  She tells me that she constantly has a swollen and uncomfortable feeling to her vaginal region.  Periodically gets burning with urination plan 6 care for a urinary tract infection.  Most recently prescribed Macrobid by urgent care based on symptoms but culture came back negative, this was just a few days ago.  Had taken a cipro and macrbid the day of UC visit.  Was given another 5 days of macrobid - on last day.  She does take Jardiance for type 2 diabetes, prescribed by her endocrinologist Dr. Urbina.  She also tells me that Jardiance helps with her pituitary issues, keeping her moods stable.  Despite an excellent A1c she does not want to come off of Jardiance.    2-stress with chronic headaches: She suffers from anxiety, depression, attention deficit disorder and PTSD.  Followed by psychiatry and seeing a therapist, Lucia.  She tells me that she has been having increasingly intense headaches.  Mentions that she has had headaches for most of her life.  Tried her 's Fiorinal and it was very helpful, she is requesting a prescription.  She does have a headache today and would like a Toradol shot, this has helped in the past.  She tells me that she knows that her headaches are from.  Denies any head injuries, traumas or falls.  Headaches are not new to her.  She also finds Flexeril helpful to treat her headaches as she feels tense in her neck.  She has received trigger point injections in her neck which have helped but prefers to stay away from this.    3.  Morbid obesity: This is a chronic struggle for her.  She does respond well to phentermine in terms of appetite suppression but 37.5 mg causes her to feel anxious and she is requesting a smaller dose.    4.  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  The rash does not bother her in terms of itching or pain.    Past medical, surgical, family, and social history is reviewed and updated in Epic chart by me today.   Medications and allergies reviewed and updated in Epic chart by me today.     ROS:   As documented in history of present illness above    Exam:  /70 (BP Location: Left arm, Patient Position: Sitting, BP Cuff Size: Large adult)   Pulse 84   Temp (!) 35.6 °C (96.1 °F)   Resp 12   Ht 1.651 m (5' 5\")   Wt 124.7 kg (275 lb)   SpO2 95%   Constitutional: Alert, no distress, plus 3 vital signs  Skin:  Warm, dry, no rashes invisible areas  Eye: Equal, round and reactive, conjunctiva clear  ENMT: Lips without lesions.  Respiratory: Unlabored respiration, lungs clear to auscultation, no wheezes, no rhonchi  Cardiovascular: Normal rate and rhythm.  : She has a very erythematous external vaginal region with white exudate in her vaginal vault.  No vesicles are seen.  No other abnormalities on vaginal exam.  Psych: Alert, pleasant, well-groomed, normal affect    Assessment / Plan / Medical Decision makin. Urinary frequency  -Urinalysis was done prior to me going in the room.  It was obviously negative in terms of leukocytes and nitrates as she is on antibiotics.  Reviewed her negative urine culture with her.  Discussed potential causes of urinary frequency with her such as excessive glucose in her urine from Jardiance as well as her weight.  Her main concern is actually discomfort in her vaginal region, see below.  - POCT Urinalysis    2. Granuloma annulare  -This was diagnosed by dermatology.  This rash is not bothering her.  She chooses to refrain from medication for it.  I did request a consult note from dermatology, she signed a release of her medical information " today.    3.  History of type 2 diabetes:  -A1c is excellent at 5.4 today.  She is also followed by endocrinology and I encouraged her to follow-up there pacifically for hypopituitarism.  She prefers to remain on Jardiance because of the benefit that she perceives in relationship to her pituitary issues.  - POCT  A1C    4. Obesity  -Retrial of phentermine at half the dosage.  She will stop phentermine if she begins having heart palpitations, insomnia or diarrhea.  - phentermine 15 MG capsule; Take 1 Cap by mouth every morning for 30 days.  Dispense: 30 Cap; Refill: 1    5. Chronic tension-type headache, intractable  -Refilled Flexeril which really helps her at night if her neck feels tense and she is having a headache.  She was given a Toradol injection today for her acute headache.  She has had Toradol in the past without any reaction and did not react negatively to Toradol today.  Refilled Fiorinal for her, she has previously been using her 's prescription which we discussed she should refrain from in the future.  Reminded her potential side effects of Fiorinal and how to take this medication.  Encouraged her to refrain from taking Fiorinal within 4 hours of alprazolam because of the butalbital content.  Discussed with T8 potential GI side effects of Fiorinal with her because of aspirin content.  Offered to refer her to a neurologist which she declines.  She and her  are currently caring for her in-laws, both of which have Alzheimer's disease which she finds very stressful.  - cyclobenzaprine (FLEXERIL) 10 MG Tab; Take 1 Tab by mouth 2 times a day as needed.  Dispense: 180 Tab; Refill: 1  - ketorolac (TORADOL) injection 60 mg  - ASPIRIN-CAFFEINE-BUTALBITAL (FIORINAL) -40 MG Cap; Take 1 Cap by mouth every 6 hours as needed for up to 30 days.  Dispense: 30 Cap; Refill: 1    6. Acute vaginitis  -Suspect that she has a chronic yeast infection related to her use of Jardiance.  She does not want to  stop Jardiance.  I did send the vaginal pathogens today.  Trial of fluconazole 150 mg every 72 hours until symptoms resolve.  Will talk on email next week regarding how she is feeling.  We had a good discussion about how Jardiance works as well as vaginitis being an extremely common side effect.  - VAGINAL PATHOGENS DNA PANEL; Future  - fluconazole (DIFLUCAN) 150 MG tablet; Take 1 Tab by mouth every 72 hours. Until symptoms resolve.  May repeat as needed  Dispense: 8 Tab; Refill: 0    Total face to face time 40 minutes of which over 50% of this visit is spent in counseling, education and outlining a plan of treatment and coordination of care for the above conditions. This included but was not limited to discussion of medication options and potential risks related to the medications, referral and specialty care options. All patient questions were answered

## 2019-08-02 LAB
CANDIDA DNA VAG QL PROBE+SIG AMP: NEGATIVE
G VAGINALIS DNA VAG QL PROBE+SIG AMP: NEGATIVE
T VAGINALIS DNA VAG QL PROBE+SIG AMP: NEGATIVE

## 2019-08-13 DIAGNOSIS — M77.11 EPICONDYLITIS, LATERAL, RIGHT: ICD-10-CM

## 2019-08-13 RX ORDER — IBUPROFEN 800 MG/1
800 TABLET ORAL EVERY 8 HOURS PRN
Qty: 60 TAB | Refills: 2 | Status: SHIPPED | OUTPATIENT
Start: 2019-08-13 | End: 2020-03-02 | Stop reason: SDUPTHER

## 2019-08-13 NOTE — TELEPHONE ENCOUNTER
Patient called and states that she twisted her left ankle and now it has swollen. She is asking for a refill of ibuprofen but also is willing to take something else if you recommend something else.  Please advise

## 2019-08-29 ENCOUNTER — TELEPHONE (OUTPATIENT)
Dept: MEDICAL GROUP | Facility: LAB | Age: 49
End: 2019-08-29

## 2019-08-29 NOTE — TELEPHONE ENCOUNTER
Typically the first 3 to 5 days are viral and antibiotics do not improve symptoms, may worsen symptoms.  I would recommend alternating Tylenol with ibuprofen as needed for body aches and fever, antihistamine therapy such as generic Claritin which dries up mucus and calms down cough, high water intake and rest.  Ask her to get back in touch with me by early next week if symptoms have not improved.

## 2019-08-29 NOTE — TELEPHONE ENCOUNTER
Has a cough, sore throat.  This started yesterday.  She is not wanting to do urgent care because they won't do much.  Is there anything you can do for her?  She is not wanting to feel terrible all weekend.

## 2019-09-02 RX ORDER — LEVOTHYROXINE SODIUM 175 UG/1
TABLET ORAL
Qty: 90 TAB | Refills: 1 | Status: SHIPPED | OUTPATIENT
Start: 2019-09-02 | End: 2020-03-13 | Stop reason: SDUPTHER

## 2019-09-02 NOTE — TELEPHONE ENCOUNTER
Was the patient seen in the last year in this department? Yes lov 8/1/19 labs 4/13/19    Does patient have an active prescription for medications requested? No     Received Request Via: Pharmacy

## 2019-09-16 ENCOUNTER — HOSPITAL ENCOUNTER (OUTPATIENT)
Facility: MEDICAL CENTER | Age: 49
End: 2019-09-16
Attending: INTERNAL MEDICINE
Payer: COMMERCIAL

## 2019-09-16 LAB
ALBUMIN SERPL BCP-MCNC: 4.4 G/DL (ref 3.2–4.9)
ALBUMIN/GLOB SERPL: 1.4 G/DL
ALP SERPL-CCNC: 56 U/L (ref 30–99)
ALT SERPL-CCNC: 15 U/L (ref 2–50)
ANION GAP SERPL CALC-SCNC: 8 MMOL/L (ref 0–11.9)
AST SERPL-CCNC: 15 U/L (ref 12–45)
BILIRUB SERPL-MCNC: 0.4 MG/DL (ref 0.1–1.5)
BUN SERPL-MCNC: 10 MG/DL (ref 8–22)
CALCIUM SERPL-MCNC: 9.2 MG/DL (ref 8.5–10.5)
CHLORIDE SERPL-SCNC: 102 MMOL/L (ref 96–112)
CO2 SERPL-SCNC: 29 MMOL/L (ref 20–33)
CREAT SERPL-MCNC: 0.82 MG/DL (ref 0.5–1.4)
GLOBULIN SER CALC-MCNC: 3.2 G/DL (ref 1.9–3.5)
GLUCOSE SERPL-MCNC: 86 MG/DL (ref 65–99)
POTASSIUM SERPL-SCNC: 4.1 MMOL/L (ref 3.6–5.5)
PROT SERPL-MCNC: 7.6 G/DL (ref 6–8.2)
SODIUM SERPL-SCNC: 139 MMOL/L (ref 135–145)

## 2019-09-16 PROCEDURE — 84305 ASSAY OF SOMATOMEDIN: CPT

## 2019-09-16 PROCEDURE — 80053 COMPREHEN METABOLIC PANEL: CPT

## 2019-09-18 LAB
IGF-I SERPL-MCNC: 269 NG/ML (ref 57–236)
IGF-I Z-SCORE SERPL: 2.1

## 2019-09-23 ENCOUNTER — TELEPHONE (OUTPATIENT)
Dept: MEDICAL GROUP | Facility: LAB | Age: 49
End: 2019-09-23

## 2019-09-23 DIAGNOSIS — S93.439A HIGH ANKLE SPRAIN, UNSPECIFIED LATERALITY, INITIAL ENCOUNTER: ICD-10-CM

## 2019-09-23 NOTE — TELEPHONE ENCOUNTER
----- Message from Bella Garibay sent at 9/23/2019  3:29 PM PDT -----  Regarding: Non-Urgent Medical Question  Contact: [phone]  Jackson BeckReny I haven't received my physical therapy referral for my high ankle sprain that Dr. Gonzales diagnosed. I am healing quite well and am ready for the next step of my recovery. Can you please refer me to [facility] at [facility]? The co-pay is only $10 and my  recommended it. Thank you. See you soon

## 2019-09-24 ENCOUNTER — HOSPITAL ENCOUNTER (OUTPATIENT)
Dept: LAB | Facility: MEDICAL CENTER | Age: 49
End: 2019-09-24
Payer: COMMERCIAL

## 2019-09-24 LAB
BDY FAT % MEASURED: 48.2 %
BP DIAS: 88 MMHG
BP SYS: 121 MMHG
CHOLEST SERPL-MCNC: 203 MG/DL (ref 100–199)
DIABETES HTDIA: YES
EST. AVERAGE GLUCOSE BLD GHB EST-MCNC: 105 MG/DL
EVENT NAME HTEVT: NORMAL
FASTING HTFAS: YES
GLUCOSE SERPL-MCNC: 76 MG/DL (ref 65–99)
HBA1C MFR BLD: 5.3 % (ref 0–5.6)
HDLC SERPL-MCNC: 60 MG/DL
HYPERTENSION HTHYP: NO
LDLC SERPL CALC-MCNC: 103 MG/DL
SCREENING LOC CITY HTCIT: NORMAL
SCREENING LOC STATE HTSTA: NORMAL
SCREENING LOCATION HTLOC: NORMAL
SMOKING HTSMO: NO
SUBSCRIBER ID HTSID: NORMAL
TRIGL SERPL-MCNC: 201 MG/DL (ref 0–149)

## 2019-09-24 PROCEDURE — 80061 LIPID PANEL: CPT

## 2019-09-24 PROCEDURE — 82947 ASSAY GLUCOSE BLOOD QUANT: CPT

## 2019-09-24 PROCEDURE — 83036 HEMOGLOBIN GLYCOSYLATED A1C: CPT

## 2019-09-24 PROCEDURE — S5190 WELLNESS ASSESSMENT BY NONPH: HCPCS

## 2019-09-24 PROCEDURE — 36415 COLL VENOUS BLD VENIPUNCTURE: CPT

## 2019-10-03 NOTE — TELEPHONE ENCOUNTER
Melinda Mcbride is a 20 year old female presenting for   Chief Complaint   Patient presents with   • Follow-up     med f/u   • Derm Problem     rough itchy areas on elbows and knees     Denies Latex allergy or sensitivity.    Medication verified, no changes  Refills needed today: Yes    Health Maintenance Summary     Cervical Cancer Screening (Every 3 Years)  Overdue since 11/4/2018    Influenza Vaccine (1)  Overdue since 9/1/2019    DTaP/Tdap/Td Vaccine (3 - Td)  Next due on 11/17/2019    Varicella Vaccine   Completed    HPV (Female) Vaccine   Completed    Meningococcal Vaccine   Completed    Meningococcal Serogroup B Vaccine   Completed    Pneumococcal Vaccine 0-64   Aged Out           Patient is due for the topics as listed above and wishes to proceed with them. Orders placed for Immunization(s) Dtap/Tdap/Td and Influenza.    Blood pressure 112/64, pulse 102, height 5' 3.78\" (1.62 m), weight 59.9 kg, last menstrual period 09/27/2019, SpO2 97 %.   Pt. Notified.

## 2019-10-05 ENCOUNTER — HOSPITAL ENCOUNTER (OUTPATIENT)
Dept: RADIOLOGY | Facility: MEDICAL CENTER | Age: 49
End: 2019-10-05
Attending: NURSE PRACTITIONER
Payer: COMMERCIAL

## 2019-10-05 DIAGNOSIS — Z12.31 VISIT FOR SCREENING MAMMOGRAM: ICD-10-CM

## 2019-10-05 PROCEDURE — 77063 BREAST TOMOSYNTHESIS BI: CPT

## 2019-10-08 ENCOUNTER — PHYSICAL THERAPY (OUTPATIENT)
Dept: PHYSICAL THERAPY | Facility: REHABILITATION | Age: 49
End: 2019-10-08
Attending: NURSE PRACTITIONER
Payer: COMMERCIAL

## 2019-10-08 DIAGNOSIS — S93.439A HIGH ANKLE SPRAIN, UNSPECIFIED LATERALITY, INITIAL ENCOUNTER: ICD-10-CM

## 2019-10-08 PROCEDURE — 97161 PT EVAL LOW COMPLEX 20 MIN: CPT

## 2019-10-08 PROCEDURE — 97140 MANUAL THERAPY 1/> REGIONS: CPT

## 2019-10-08 PROCEDURE — 97014 ELECTRIC STIMULATION THERAPY: CPT

## 2019-10-08 ASSESSMENT — ENCOUNTER SYMPTOMS
PAIN SCALE AT LOWEST: 1
PAIN SCALE: 2
PAIN SCALE AT HIGHEST: 10

## 2019-10-08 NOTE — OP THERAPY EVALUATION
Outpatient Physical Therapy  INITIAL EVALUATION    [facility] Physical Therapy [address]  [address]  [place] [address]  Phone:  [phone]  Fax:  [phone]    Date of Evaluation: 10/08/2019    Patient: Bella Garibay  YOB: 1970  MRN: [id number]     Referring Provider: ERIKA Kumari  [address] [facility] [address]  [place] [address]   Referring Diagnosis High ankle sprain, unspecified laterality, initial encounter [S93.439A]     Time Calculation  Start time: 0118  Stop time: 5 Time Calculation (min): 67 minutes           Chief Complaint: No chief complaint on file.    Visit Diagnoses     ICD-10-CM   1. High ankle sprain, unspecified laterality, initial encounter S93.439A         Subjective   History of Present Illness:     History of chief complaint:  Stepped wrong as stepping down stairs and rolled her ankle with medial malleoli fracture--non-weight bearing with boot x 6 weeks.  Used a knee scooter for 2 weeks    Prior level of function:  [employer]etor [employer]: 60-80% standing--constant moving    Pain:     Current pain ratin    At best pain ratin    At worst pain rating:  10    Location:  Medial malleoli pain    Aggravating factors:  Stand more than 10' doing dishes increases pain  Up worse than down one step increases pain  Walk more than 10 ft increases limp  LEFS: 31/80          Past Medical History:   Diagnosis Date   • Bronchitis 2014   • Early menopause     @ 39   • Hypothyroid 2009   • Obesity 2009     Past Surgical History:   Procedure Laterality Date   • PRIMARY C SECTION         Precautions:       Objective   Observation and functional movement:  Noted lateral ankle pocket edema with figure 8 edema signficantly > R     Range of motion and strength:    DF:0erp    PF:55  EV:20erp  INV:23    Palpation and joint mobility:     Noted gd 2-3 laxity for L anterior drawer            Therapeutic Treatments and Modalities:      Therapeutic Treatment and Modalities Summary: heel slide for df/pf--foot flat--control medial lateral motion--hep  DN: Patient signed informed written release and verbally agreed with informed consent to procedure of dry needling   skin prep with isopropyl  Alcohol  -L medial gastroc/ED,FL/AT  -TENS w/ DN x 8'  -MHP x 10'  -No adverse reactions observed post treatment  kinesio tape ED/AT  Gait trg w/ and w/o SPC --instructed patient to shorten stride and use % while ambulating    Time-based treatments/modalities:  Manual therapy minutes (CPT 58653): 10 minutes  Gait training minutes (CPT 62202): 5 minutes  Therapeutic exercise minutes (CPT 95090): 5 minutes       Assessment, Response and Plan:   Assessment details:  8 weeks s/p anterior lateral L ankle sprain with noted anterior drawer laxity with firm end feel.  Noted severe palpatory tenderness medial malleoli and mild anterior ankle crease pain.  Noted severe TTP ED/AT and FL  Prognosis: good    Goals:   Short Term Goals:   Stand more than 10' doing dishes increases pain  Up worse than down one step increases pain  Walk more than 10 ft increases limp  LEFS: 31/80  Short term goal time span:  2-4 weeks      Long Term Goals:    Stand more than 10' doing dishes increases pain  Up worse than down one step increases pain  Walk more than 10 ft increases limp  LEFS: 31/80  Long term goal time span:  6-8 weeks    Plan:   Therapy options:  Physical therapy treatment to continue  Planned therapy interventions:  E Stim Attended (CPT 80362), Manual Therapy (CPT 80222), Therapeutic Exercise (CPT 84734) and Gait Training (CPT 46628)  Other planned therapy interventions:  Dry needle  Frequency:  2x week  Duration in weeks:  8  Duration in visits:  10  Plan details:  DN, gait trg, balance trg, IASTM/cupping      Functional Assessment Used  Lower Extremity Functional Scale Total: 31.25     Referring provider co-signature:  I have reviewed this plan of care and my  co-signature certifies the need for services.      Physician Signature: ________________________________ Date: ______________

## 2019-10-24 ENCOUNTER — APPOINTMENT (OUTPATIENT)
Dept: PHYSICAL THERAPY | Facility: REHABILITATION | Age: 49
End: 2019-10-24
Attending: NURSE PRACTITIONER
Payer: COMMERCIAL

## 2019-10-29 ENCOUNTER — PHYSICAL THERAPY (OUTPATIENT)
Dept: PHYSICAL THERAPY | Facility: REHABILITATION | Age: 49
End: 2019-10-29
Attending: NURSE PRACTITIONER
Payer: COMMERCIAL

## 2019-10-29 DIAGNOSIS — S93.439A HIGH ANKLE SPRAIN, UNSPECIFIED LATERALITY, INITIAL ENCOUNTER: ICD-10-CM

## 2019-10-29 PROCEDURE — 97110 THERAPEUTIC EXERCISES: CPT

## 2019-10-29 PROCEDURE — 97014 ELECTRIC STIMULATION THERAPY: CPT

## 2019-10-29 PROCEDURE — 97140 MANUAL THERAPY 1/> REGIONS: CPT

## 2019-10-29 NOTE — OP THERAPY DAILY TREATMENT
"  Outpatient Physical Therapy  DAILY TREATMENT     [facility] Physical Therapy [address].  [address]  [place] [address]  Phone:  [phone]  Fax:  [phone]    Date: 10/29/2019    Patient: Bella Garibay  YOB: 1970  MRN: [id number]     Time Calculation  Start time: 0315  Stop time: 0405 Time Calculation (min): 50 minutes       Chief Complaint: No chief complaint on file.    Visit #: 2    SUBJECTIVE:  Busy, with family and limited compliance with HEP. Walks with less pain with some pain going down stairs being the worst  Was sore after last treatment but great for a few days once initial pain  OBJECTIVE:            Therapeutic Treatments and Modalities:     Therapeutic Treatment and Modalities Summary: DN: Patient signed informed written release and verbally agreed with informed consent to procedure of dry needling   skin prep with isopropyl  Alcohol  IASTM and stm to /FD and ant. Tib  -R: TP,FD ,AT, FL  -TENS w/ DN 10'  -MHP x 10'  -No adverse reactions observed post treatment  \" sore from needles but better\"    Time-based treatments/modalities:  Manual therapy minutes (CPT 41026): 20 minutes  Therapeutic exercise minutes (CPT 95982): 5 minutes         ASSESSMENT:   Decreased antalgia with minimal complaint while walking./  Patient stated that she walkked on uneveen surfaces with minimal pain    PLAN/RECOMMENDATIONS:   DN, dynamic balance trg.       "

## 2019-10-31 ENCOUNTER — APPOINTMENT (OUTPATIENT)
Dept: PHYSICAL THERAPY | Facility: REHABILITATION | Age: 49
End: 2019-10-31
Attending: NURSE PRACTITIONER
Payer: COMMERCIAL

## 2019-11-05 ENCOUNTER — PHYSICAL THERAPY (OUTPATIENT)
Dept: PHYSICAL THERAPY | Facility: REHABILITATION | Age: 49
End: 2019-11-05
Attending: NURSE PRACTITIONER
Payer: COMMERCIAL

## 2019-11-05 DIAGNOSIS — S93.439A HIGH ANKLE SPRAIN, UNSPECIFIED LATERALITY, INITIAL ENCOUNTER: ICD-10-CM

## 2019-11-05 PROCEDURE — 97110 THERAPEUTIC EXERCISES: CPT

## 2019-11-05 PROCEDURE — 97112 NEUROMUSCULAR REEDUCATION: CPT

## 2019-11-05 NOTE — OP THERAPY DAILY TREATMENT
"  Outpatient Physical Therapy  DAILY TREATMENT     [facility] Physical Therapy [address].  [address]  [place] [address]  Phone:  [phone]  Fax:  [phone]    Date: 11/05/2019    Patient: Bella Garibay  YOB: 1970  MRN: [id number]     Time Calculation  Start time: 0345  Stop time: 0430 Time Calculation (min): 45 minutes       Chief Complaint: No chief complaint on file.    Visit #: 3    SUBJECTIVE:  Overall, less pain but still limited pain after 3 minutes of walking  OBJECTIVE: mild tenderness to palpation   Walks w/o antalgia            Therapeutic Treatments and Modalities:     Therapeutic Treatment and Modalities Summary: bosu x 5' blue side up  Tandem bosu blk  Mini tramp hop/jog x 8'    Actually feel a little better\"    Time-based treatments/modalities:  Therapeutic exercise minutes (CPT 80845): 10 minutes  Neuromusc re-ed, balance, coor, post minutes (CPT 57891): 15 minutes     Pain 1/10-- medial lateral L ankle    ASSESSMENT:   Increased activity tolerance with decreased pain after treatment    PLAN/RECOMMENDATIONS:   DN, dynamic balance trg.       "

## 2019-11-07 ENCOUNTER — APPOINTMENT (OUTPATIENT)
Dept: PHYSICAL THERAPY | Facility: REHABILITATION | Age: 49
End: 2019-11-07
Attending: NURSE PRACTITIONER
Payer: COMMERCIAL

## 2019-11-12 ENCOUNTER — APPOINTMENT (OUTPATIENT)
Dept: PHYSICAL THERAPY | Facility: REHABILITATION | Age: 49
End: 2019-11-12
Attending: NURSE PRACTITIONER
Payer: COMMERCIAL

## 2019-11-14 ENCOUNTER — APPOINTMENT (OUTPATIENT)
Dept: PHYSICAL THERAPY | Facility: REHABILITATION | Age: 49
End: 2019-11-14
Attending: NURSE PRACTITIONER
Payer: COMMERCIAL

## 2019-11-19 ENCOUNTER — PHYSICAL THERAPY (OUTPATIENT)
Dept: PHYSICAL THERAPY | Facility: REHABILITATION | Age: 49
End: 2019-11-19
Attending: NURSE PRACTITIONER
Payer: COMMERCIAL

## 2019-11-19 DIAGNOSIS — S93.439A HIGH ANKLE SPRAIN, UNSPECIFIED LATERALITY, INITIAL ENCOUNTER: ICD-10-CM

## 2019-11-19 PROCEDURE — 97110 THERAPEUTIC EXERCISES: CPT

## 2019-11-19 NOTE — OP THERAPY DISCHARGE SUMMARY
Outpatient Physical Therapy  DISCHARGE SUMMARY NOTE      [facility] Physical Therapy [address].  [address]  [place] [address]  Phone:  [phone]  Fax:  [phone]    Date of Visit: 11/19/2019    Patient: Bella Garibay  YOB: 1970  MRN: [id number]     Referring Provider: ERIKA Kumari  [address] [facility] [address]  [place] [address]   Referring Diagnosis Sprain of tibiofibular ligament of unspecified ankle, initial encounter [S93.439A]     Physical Therapy Occurrence Codes    Date of onset of impairment:  7/13/19   Date physical therapy care plan established or reviewed:  10/8/19   Date physical therapy treatment started:  10/8/19          Functional Assessment Used  PT Functional Assessment Tool Used: LEFS  PT Functional Assessment Score: 80/80-- no limit     Your patient is being discharged from Physical Therapy with the following comments:   · Goals met    Patient reported walking up steep hill w/o limit    Kem Lindsey, PT, DPT, OCS    Date: 11/19/2019

## 2019-11-19 NOTE — OP THERAPY DAILY TREATMENT
Outpatient Physical Therapy  DAILY TREATMENT     [facility] Physical Therapy [address].  [address]  [place] [address]  Phone:  [phone]  Fax:  [phone]    Date: 11/19/2019    Patient: Bella Garibay  YOB: 1970  MRN: [id number]     Time Calculation  Start time: 0128  Stop time: 0140 Time Calculation (min): 12 minutes       Chief Complaint: No chief complaint on file.    Visit #: 4    SUBJECTIVE:  No limits, steep climb up trail w/o pain  OBJECTIVE:            Therapeutic Treatments and Modalities:     Therapeutic Treatment and Modalities Summary: Reviewed HEP and progression  sls EC/EO  Tandem EC/EO    Time-based treatments/modalities:  Therapeutic exercise minutes (CPT 94556): 10 minutes         ASSESSMENT:   All goals met    PLAN/RECOMMENDATIONS:   D/C to an independent HEP

## 2019-11-26 ENCOUNTER — APPOINTMENT (OUTPATIENT)
Dept: PHYSICAL THERAPY | Facility: REHABILITATION | Age: 49
End: 2019-11-26
Attending: NURSE PRACTITIONER
Payer: COMMERCIAL

## 2019-12-10 DIAGNOSIS — F51.01 PRIMARY INSOMNIA: ICD-10-CM

## 2019-12-10 RX ORDER — AMITRIPTYLINE HYDROCHLORIDE 50 MG/1
TABLET, FILM COATED ORAL
Qty: 90 TAB | Refills: 3 | Status: SHIPPED | OUTPATIENT
Start: 2019-12-10 | End: 2020-07-30

## 2019-12-10 NOTE — TELEPHONE ENCOUNTER
Was the patient seen in the last year in this department? Yes  8/1/19  Does patient have an active prescription for medications requested? No     Received Request Via: Pharmacy

## 2020-01-06 RX ORDER — BUPROPION HYDROCHLORIDE 150 MG/1
TABLET ORAL
Qty: 90 TAB | Refills: 0 | Status: SHIPPED | OUTPATIENT
Start: 2020-01-06 | End: 2020-04-01 | Stop reason: SDUPTHER

## 2020-01-06 NOTE — TELEPHONE ENCOUNTER
Was the patient seen in the last year in this department? Yes 08/01/2019    Does patient have an active prescription for medications requested? Yes    Received Request Via: Pharmacy     buPROPion HCl ER (XL) 150 MG Oral Tablet Extended Release 24 Hour

## 2020-01-23 ENCOUNTER — PATIENT MESSAGE (OUTPATIENT)
Dept: MEDICAL GROUP | Facility: LAB | Age: 50
End: 2020-01-23

## 2020-01-23 DIAGNOSIS — Z12.11 COLON CANCER SCREENING: ICD-10-CM

## 2020-01-23 NOTE — TELEPHONE ENCOUNTER
----- Message from Bella Garibay sent at 1/23/2020  2:10 PM PST -----  Regarding: Non-Urgent Medical Question  Contact: [phone] hi can I get a refusal for a colonoscopy? My poop is like little hard nuggets and I've been on probiotics and stool softeners for over a month. Thank you

## 2020-02-10 ENCOUNTER — PATIENT MESSAGE (OUTPATIENT)
Dept: HEALTH INFORMATION MANAGEMENT | Facility: OTHER | Age: 50
End: 2020-02-10

## 2020-02-27 ENCOUNTER — OFFICE VISIT (OUTPATIENT)
Dept: MEDICAL GROUP | Facility: LAB | Age: 50
End: 2020-02-27
Payer: COMMERCIAL

## 2020-02-27 VITALS
RESPIRATION RATE: 16 BRPM | OXYGEN SATURATION: 97 % | SYSTOLIC BLOOD PRESSURE: 100 MMHG | DIASTOLIC BLOOD PRESSURE: 70 MMHG | WEIGHT: 276 LBS | TEMPERATURE: 97.1 F | BODY MASS INDEX: 45.98 KG/M2 | HEIGHT: 65 IN | HEART RATE: 86 BPM

## 2020-02-27 DIAGNOSIS — Z23 NEED FOR INFLUENZA VACCINATION: ICD-10-CM

## 2020-02-27 DIAGNOSIS — J06.9 ACUTE URI: ICD-10-CM

## 2020-02-27 PROCEDURE — 99214 OFFICE O/P EST MOD 30 MIN: CPT | Mod: 25 | Performed by: NURSE PRACTITIONER

## 2020-02-27 PROCEDURE — 90686 IIV4 VACC NO PRSV 0.5 ML IM: CPT | Performed by: NURSE PRACTITIONER

## 2020-02-27 PROCEDURE — 90471 IMMUNIZATION ADMIN: CPT | Performed by: NURSE PRACTITIONER

## 2020-02-27 RX ORDER — AMOXICILLIN AND CLAVULANATE POTASSIUM 875; 125 MG/1; MG/1
1 TABLET, FILM COATED ORAL 2 TIMES DAILY
Qty: 14 TAB | Refills: 0 | Status: SHIPPED | OUTPATIENT
Start: 2020-02-27 | End: 2020-03-05

## 2020-02-27 RX ORDER — PROMETHAZINE HYDROCHLORIDE AND CODEINE PHOSPHATE 6.25; 1 MG/5ML; MG/5ML
5-10 SYRUP ORAL 2 TIMES DAILY PRN
Qty: 140 ML | Refills: 0 | Status: SHIPPED
Start: 2020-02-27 | End: 2020-02-27 | Stop reason: SDUPTHER

## 2020-02-27 ASSESSMENT — PATIENT HEALTH QUESTIONNAIRE - PHQ9: CLINICAL INTERPRETATION OF PHQ2 SCORE: 0

## 2020-02-28 NOTE — PROGRESS NOTES
"Chief Complaint   Patient presents with   • Sweats     X 6 days   • Cough       HPI   Camryn is a 49-year-old established female here with c/o new onset ST, congestion, HA and cough x the past 5-6 days.  Symptoms seem to be worsening.  Has tried OTC mucinex.  Mucus: Dark yellow / green.  Denies SOB or wheezing.  Nonsmoker - stopped smoking 10 yrs ago.  Grandchildren are sick too.  She feels miserable.    Past medical, surgical, family, and social history is reviewed and updated in Epic chart by me today.   Medications and allergies reviewed and updated in Epic chart by me today.     ROS:   Denies n/v/d or abdominal pain.     Exam:  /70 (BP Location: Left arm, Patient Position: Sitting, BP Cuff Size: Large adult)   Pulse 86   Temp 36.2 °C (97.1 °F)   Resp 16   Ht 1.651 m (5' 5\")   Wt (!) 125.2 kg (276 lb)   SpO2 97%     Constitutional: Alert, no distress, plus 3 vital signs  Skin:  Warm, dry, no rashes invisible areas  Eye: Equal, round and reactive, conjunctiva clear  ENMT: Bilateral tympanic membranes are retracted but translucent without erythema or perforation. Positive bilateral maxillary sinus tenderness. Oropharynx is slightly injected without exudate. No tonsillar enlargement.    Neck: Mild anterior cervical, nontender lymphadenopathy  Respiratory: Unlabored respiration, lungs clear to auscultation, no wheezes, no rhonchi  Cardiovascular: Normal rate and rhythm  Psych: Alert, pleasant, well-groomed, normal affect      A/P:  -Acute URI, likely sinusitis and bronchitis: Refilled codeine cough syrup as requested by the patient.  Intake, vitamin C and follow-up in 5 to 7 days if not improved, sooner with worsening.    Updated influenza vaccine today.  "

## 2020-03-02 DIAGNOSIS — M77.11 EPICONDYLITIS, LATERAL, RIGHT: ICD-10-CM

## 2020-03-02 RX ORDER — IBUPROFEN 800 MG/1
800 TABLET ORAL EVERY 8 HOURS PRN
Qty: 60 TAB | Refills: 3 | Status: SHIPPED
Start: 2020-03-02 | End: 2021-01-18

## 2020-03-02 RX ORDER — PROMETHAZINE HYDROCHLORIDE AND CODEINE PHOSPHATE 6.25; 1 MG/5ML; MG/5ML
5-10 SYRUP ORAL 2 TIMES DAILY PRN
Qty: 140 ML | Refills: 0 | Status: SHIPPED
Start: 2020-03-02 | End: 2020-03-09

## 2020-03-02 NOTE — TELEPHONE ENCOUNTER
Received request via: Pharmacy   2/28/2020  Was the patient seen in the last year in this department? Yes2/27/20    Does the patient have an active prescription (recently filled or refills available) for medication(s) requested? No

## 2020-03-02 NOTE — TELEPHONE ENCOUNTER
Received request via: Pharmacy    Was the patient seen in the last year in this department? Yes2/27/20    Does the patient have an active prescription (recently filled or refills available) for medication(s) requested? No

## 2020-03-04 ENCOUNTER — TELEPHONE (OUTPATIENT)
Dept: MEDICAL GROUP | Facility: LAB | Age: 50
End: 2020-03-04

## 2020-03-04 DIAGNOSIS — R05.8 COUGH PRESENT FOR GREATER THAN 3 WEEKS: ICD-10-CM

## 2020-03-04 NOTE — TELEPHONE ENCOUNTER
1. Caller Name: Bella Garibay                        Call Back Number:[phone]      How would the patient prefer to be contacted with a response: Phone call OK to leave a detailed message    Pt is on her last day of antibiotics. Stated her cough has moved into her chest. Do you want to do an x-ray?

## 2020-03-05 ENCOUNTER — HOSPITAL ENCOUNTER (OUTPATIENT)
Dept: RADIOLOGY | Facility: MEDICAL CENTER | Age: 50
End: 2020-03-05
Attending: NURSE PRACTITIONER
Payer: COMMERCIAL

## 2020-03-05 DIAGNOSIS — R05.8 COUGH PRESENT FOR GREATER THAN 3 WEEKS: ICD-10-CM

## 2020-03-05 PROCEDURE — 71046 X-RAY EXAM CHEST 2 VIEWS: CPT

## 2020-03-05 RX ORDER — DOXYCYCLINE HYCLATE 100 MG
100 TABLET ORAL 2 TIMES DAILY
Qty: 14 TAB | Refills: 0 | Status: SHIPPED | OUTPATIENT
Start: 2020-03-05 | End: 2020-03-12

## 2020-03-05 NOTE — TELEPHONE ENCOUNTER
If she has her chest x-ray done today at [facility], it will be done within 2 hours and I can get back with her regarding results before prescribing more medicine.

## 2020-03-05 NOTE — TELEPHONE ENCOUNTER
Pt will be getting her imaging done today. They say the results will not be back for a few days. Pt is leaving out of town tomorrow asking for a new medication to be sent to pharmacy before she leaves.

## 2020-03-09 DIAGNOSIS — F41.9 ANXIETY: ICD-10-CM

## 2020-03-09 RX ORDER — ALPRAZOLAM 1 MG/1
TABLET ORAL
Qty: 90 TAB | Refills: 3 | Status: SHIPPED
Start: 2020-03-09 | End: 2020-04-06

## 2020-03-09 NOTE — TELEPHONE ENCOUNTER
Received request via: Pharmacy    2/8/20  Was the patient seen in the last year in this department? Yes  2/27/20  Does the patient have an active prescription (recently filled or refills available) for medication(s) requested? No

## 2020-03-10 DIAGNOSIS — E66.9 OBESITY: ICD-10-CM

## 2020-03-10 DIAGNOSIS — F51.01 PRIMARY INSOMNIA: ICD-10-CM

## 2020-03-10 DIAGNOSIS — G44.221 CHRONIC TENSION-TYPE HEADACHE, INTRACTABLE: ICD-10-CM

## 2020-03-10 RX ORDER — BUTALBITAL, ASPIRIN, AND CAFFEINE 325; 50; 40 MG/1; MG/1; MG/1
1 CAPSULE ORAL EVERY 6 HOURS PRN
Qty: 30 CAP | Refills: 1 | Status: SHIPPED
Start: 2020-03-10 | End: 2020-04-09

## 2020-03-10 RX ORDER — PHENTERMINE HYDROCHLORIDE 15 MG/1
15 CAPSULE ORAL EVERY MORNING
Qty: 30 CAP | Refills: 1 | Status: SHIPPED
Start: 2020-03-10 | End: 2020-04-09

## 2020-03-10 RX ORDER — AMITRIPTYLINE HYDROCHLORIDE 150 MG/1
TABLET ORAL
Qty: 90 TAB | Refills: 3 | Status: SHIPPED
Start: 2020-03-10 | End: 2020-11-04

## 2020-03-10 NOTE — TELEPHONE ENCOUNTER
Received request via: Patient   Last Fill 10/8/19 /Butalbital  9/19/19 phentermine  Was the patient seen in the last year in this department? Yes    Does the patient have an active prescription (recently filled or refills available) for medication(s) requested? No

## 2020-03-13 ENCOUNTER — APPOINTMENT (RX ONLY)
Dept: URBAN - METROPOLITAN AREA CLINIC 4 | Facility: CLINIC | Age: 50
Setting detail: DERMATOLOGY
End: 2020-03-13

## 2020-03-13 ENCOUNTER — TELEPHONE (OUTPATIENT)
Dept: MEDICAL GROUP | Facility: LAB | Age: 50
End: 2020-03-13

## 2020-03-13 DIAGNOSIS — Z12.9 SCREENING FOR CANCER: ICD-10-CM

## 2020-03-13 DIAGNOSIS — J39.2 LESION OF THROAT: ICD-10-CM

## 2020-03-13 RX ORDER — LEVOTHYROXINE SODIUM 175 UG/1
175 TABLET ORAL
Qty: 90 TAB | Refills: 3 | Status: SHIPPED | OUTPATIENT
Start: 2020-03-13 | End: 2021-03-09 | Stop reason: SDUPTHER

## 2020-03-13 NOTE — TELEPHONE ENCOUNTER
----- Message from Bella Garibay sent at 3/13/2020 11:33 AM PDT -----  Regarding: RE: Non-Urgent Medical Question  Contact: [phone]  I noticed it about a month ago. It has gotten a little bit bigger. It looks like a sore with raised white bumps. I'm concerned because I smoked for over 20years and I have a lot of cancer fatalities in my family. Yes I'm worried    ----- Message -----  From: Veronique Ruiz, Med Ass't  Sent: 3/13/20, 11:17 AM  To: Bella Garibay  Subject: RE: Non-Urgent Medical Question    Is this an ongoing issue? What kind of sore? I need to put this on referral.  Thanks       ----- Message -----       From:Bella Garibay       Sent:3/13/2020 11:05 AM PDT         To:CHRISTIAN KumariPJIM    Subject:Non-Urgent Medical Question    This message is being sent by MIN Garibay on behalf of Bella Garibay    Need a referral to a ear nose and throat dr for a sore in back of throat     Please       - - - DISCLAIMER - - -  [email or web address][This message may contain formatting that cannot be shown on this site.]

## 2020-03-13 NOTE — TELEPHONE ENCOUNTER
Received request via: Pharmacy    Was the patient seen in the last year in this department? Yes  2/27/20  Does the patient have an active prescription (recently filled or refills available) for medication(s) requested? No

## 2020-04-01 RX ORDER — BUPROPION HYDROCHLORIDE 150 MG/1
150 TABLET ORAL EVERY MORNING
Qty: 90 TAB | Refills: 3 | Status: SHIPPED | OUTPATIENT
Start: 2020-04-01 | End: 2021-04-05

## 2020-05-14 DIAGNOSIS — G89.29 CHRONIC BILATERAL LOW BACK PAIN WITH RIGHT-SIDED SCIATICA: ICD-10-CM

## 2020-05-14 DIAGNOSIS — M54.41 CHRONIC BILATERAL LOW BACK PAIN WITH RIGHT-SIDED SCIATICA: ICD-10-CM

## 2020-05-14 NOTE — TELEPHONE ENCOUNTER
Received request via: Pharmacy   2/4/20  Was the patient seen in the last year in this department? Yes  2/27/20  Does the patient have an active prescription (recently filled or refills available) for medication(s) requested? No

## 2020-05-14 NOTE — TELEPHONE ENCOUNTER
----- Message from Bella Garibay sent at 5/13/2020  5:58 PM PDT -----  Regarding: Prescription Question  Contact: [phone]  Can I get a refill of my acetmi/codein 300-30mg sent to my new pharmacy [facility]     Thanks you  Bella

## 2020-07-01 ENCOUNTER — HOSPITAL ENCOUNTER (OUTPATIENT)
Dept: LAB | Facility: MEDICAL CENTER | Age: 50
End: 2020-07-01
Attending: INTERNAL MEDICINE
Payer: COMMERCIAL

## 2020-07-01 LAB
ALBUMIN SERPL BCP-MCNC: 4.3 G/DL (ref 3.2–4.9)
ALBUMIN/GLOB SERPL: 1.5 G/DL
ALP SERPL-CCNC: 57 U/L (ref 30–99)
ALT SERPL-CCNC: 18 U/L (ref 2–50)
ANION GAP SERPL CALC-SCNC: 10 MMOL/L (ref 7–16)
AST SERPL-CCNC: 14 U/L (ref 12–45)
BILIRUB SERPL-MCNC: 0.5 MG/DL (ref 0.1–1.5)
BUN SERPL-MCNC: 15 MG/DL (ref 8–22)
CALCIUM SERPL-MCNC: 9.3 MG/DL (ref 8.5–10.5)
CHLORIDE SERPL-SCNC: 100 MMOL/L (ref 96–112)
CO2 SERPL-SCNC: 25 MMOL/L (ref 20–33)
CREAT SERPL-MCNC: 0.72 MG/DL (ref 0.5–1.4)
CREAT UR-MCNC: 68.57 MG/DL
EST. AVERAGE GLUCOSE BLD GHB EST-MCNC: 114 MG/DL
GLOBULIN SER CALC-MCNC: 2.9 G/DL (ref 1.9–3.5)
GLUCOSE SERPL-MCNC: 108 MG/DL (ref 65–99)
HBA1C MFR BLD: 5.6 % (ref 0–5.6)
PHOSPHATE SERPL-MCNC: 2.8 MG/DL (ref 2.5–4.5)
POTASSIUM SERPL-SCNC: 4.1 MMOL/L (ref 3.6–5.5)
POTASSIUM UR-SCNC: 62 MMOL/L
PROT SERPL-MCNC: 7.2 G/DL (ref 6–8.2)
SARS-COV-2 AB SERPL QL IA: NORMAL
SODIUM SERPL-SCNC: 135 MMOL/L (ref 135–145)
SODIUM UR-SCNC: 99 MMOL/L

## 2020-07-01 PROCEDURE — 84105 ASSAY OF URINE PHOSPHORUS: CPT

## 2020-07-01 PROCEDURE — 83036 HEMOGLOBIN GLYCOSYLATED A1C: CPT

## 2020-07-01 PROCEDURE — 84300 ASSAY OF URINE SODIUM: CPT

## 2020-07-01 PROCEDURE — 86769 SARS-COV-2 COVID-19 ANTIBODY: CPT

## 2020-07-01 PROCEDURE — 36415 COLL VENOUS BLD VENIPUNCTURE: CPT

## 2020-07-01 PROCEDURE — 84305 ASSAY OF SOMATOMEDIN: CPT

## 2020-07-01 PROCEDURE — 84100 ASSAY OF PHOSPHORUS: CPT

## 2020-07-01 PROCEDURE — 80053 COMPREHEN METABOLIC PANEL: CPT

## 2020-07-01 PROCEDURE — 82570 ASSAY OF URINE CREATININE: CPT

## 2020-07-01 PROCEDURE — 82340 ASSAY OF CALCIUM IN URINE: CPT

## 2020-07-01 PROCEDURE — 84133 ASSAY OF URINE POTASSIUM: CPT

## 2020-07-04 LAB
CALCIUM 24H UR-MCNC: 5.5 MG/DL
CALCIUM 24H UR-MRATE: NORMAL MG/D
CALCIUM/CREAT 24H UR: 86 MG/G (ref 20–300)
COLLECT DURATION TIME SPEC: NORMAL HRS
COLLECT DURATION TIME SPEC: NORMAL HRS
CREAT 24H UR-MCNC: 64 MG/DL
CREAT 24H UR-MRATE: NORMAL MG/D (ref 700–1600)
IGF-I SERPL-MCNC: 284 NG/ML (ref 57–236)
IGF-I Z-SCORE SERPL: 2.2
PHOSPHATE 24H UR-MCNC: 66 MG/DL
PHOSPHATE 24H UR-MRATE: NORMAL MG/D (ref 400–1300)
PHOSPHATE/CREAT 24H UR: 1031 MG/G
SPECIMEN VOL ?TM UR: NORMAL ML
TOTAL VOLUME 1105: NORMAL ML

## 2020-07-07 DIAGNOSIS — F41.9 ANXIETY: ICD-10-CM

## 2020-07-07 NOTE — TELEPHONE ENCOUNTER
Received request via: Pharmacy    Was the patient seen in the last year in this department? Yes   last Fill 6/9/20 #90  Does the patient have an active prescription (recently filled or refills available) for medication(s) requested? No

## 2020-07-08 RX ORDER — ALPRAZOLAM 1 MG/1
TABLET ORAL
Qty: 90 TAB | Refills: 2 | Status: SHIPPED | OUTPATIENT
Start: 2020-07-08 | End: 2020-08-07

## 2020-07-27 ENCOUNTER — HOSPITAL ENCOUNTER (OUTPATIENT)
Facility: MEDICAL CENTER | Age: 50
End: 2020-07-27
Attending: INTERNAL MEDICINE | Admitting: INTERNAL MEDICINE
Payer: COMMERCIAL

## 2020-07-30 ENCOUNTER — APPOINTMENT (OUTPATIENT)
Dept: ADMISSIONS | Facility: MEDICAL CENTER | Age: 50
End: 2020-07-30
Payer: COMMERCIAL

## 2020-07-30 VITALS — HEIGHT: 65 IN | BODY MASS INDEX: 46.5 KG/M2 | WEIGHT: 279.1 LBS

## 2020-07-30 DIAGNOSIS — Z01.812 PRE-OPERATIVE LABORATORY EXAMINATION: ICD-10-CM

## 2020-07-30 LAB
COVID ORDER STATUS COVID19: NORMAL
COVID ORDER STATUS COVID19: NORMAL
SARS-COV-2 RNA RESP QL NAA+PROBE: NOTDETECTED
SPECIMEN SOURCE: NORMAL

## 2020-07-30 PROCEDURE — U0003 INFECTIOUS AGENT DETECTION BY NUCLEIC ACID (DNA OR RNA); SEVERE ACUTE RESPIRATORY SYNDROME CORONAVIRUS 2 (SARS-COV-2) (CORONAVIRUS DISEASE [COVID-19]), AMPLIFIED PROBE TECHNIQUE, MAKING USE OF HIGH THROUGHPUT TECHNOLOGIES AS DESCRIBED BY CMS-2020-01-R: HCPCS

## 2020-07-30 RX ORDER — PHENTERMINE HYDROCHLORIDE 15 MG/1
15 CAPSULE ORAL
COMMUNITY
Start: 2020-07-14 | End: 2020-09-01

## 2020-07-30 SDOH — HEALTH STABILITY: MENTAL HEALTH: HOW OFTEN DO YOU HAVE 6 OR MORE DRINKS ON ONE OCCASION?: NEVER

## 2020-07-30 ASSESSMENT — FIBROSIS 4 INDEX: FIB4 SCORE: 0.71

## 2020-07-30 NOTE — OR NURSING
Dr Marshall reviewed patients medical history. Based upon information available, Dr Marshall indicates:     Ok to proceed from my standpoint. Thank you.   Jayme Marshall M.D.  [facility]

## 2020-07-30 NOTE — OR NURSING
"Pre-admit appointment completed. \"Preparing for your procedure\" sheet given to pt along with verbal and written instructions. Pt instructed to continue regularly prescribed medications through the day before surgery. Pt instructed to take the following medications the day of surgery with a sip of water, per anesthesia protocol; synthroid and if needed-xanax.    Pt to get COVID test today.           Pt given instructions to self isolate after COVID test and to contact doctor if any symptoms of COVID occur.    Pt was not aware of her need for a bowel prep. Contacted GI center regarding prep. Pt to go there to  today.    When pt was leaving she stated she is probably not going to have the procedure because she is too scared. Tried to reassure pt instructions are for patients receiving anesthesia. Pt states she takes xanax for a reason and she is just leaving.    Informed GI center , Bella, of above so they can follow up with pt.   "

## 2020-09-01 DIAGNOSIS — E66.01 MORBID OBESITY WITH BMI OF 40.0-44.9, ADULT (HCC): ICD-10-CM

## 2020-09-01 RX ORDER — PHENTERMINE HYDROCHLORIDE 15 MG/1
CAPSULE ORAL
Qty: 30 CAP | Refills: 0 | Status: SHIPPED | OUTPATIENT
Start: 2020-09-01 | End: 2020-11-04 | Stop reason: SDUPTHER

## 2020-10-06 DIAGNOSIS — F41.9 ANXIETY: ICD-10-CM

## 2020-10-07 RX ORDER — ALPRAZOLAM 1 MG/1
TABLET ORAL
Qty: 90 TAB | Refills: 1 | Status: SHIPPED | OUTPATIENT
Start: 2020-10-07 | End: 2021-01-11 | Stop reason: SDUPTHER

## 2020-11-04 ENCOUNTER — HOSPITAL ENCOUNTER (OUTPATIENT)
Facility: MEDICAL CENTER | Age: 50
End: 2020-11-04
Attending: NURSE PRACTITIONER
Payer: COMMERCIAL

## 2020-11-04 ENCOUNTER — OFFICE VISIT (OUTPATIENT)
Dept: MEDICAL GROUP | Facility: LAB | Age: 50
End: 2020-11-04
Payer: COMMERCIAL

## 2020-11-04 VITALS
RESPIRATION RATE: 16 BRPM | DIASTOLIC BLOOD PRESSURE: 82 MMHG | BODY MASS INDEX: 47.48 KG/M2 | OXYGEN SATURATION: 93 % | HEART RATE: 86 BPM | WEIGHT: 285 LBS | SYSTOLIC BLOOD PRESSURE: 118 MMHG | HEIGHT: 65 IN | TEMPERATURE: 97.7 F

## 2020-11-04 DIAGNOSIS — F41.9 ANXIETY: ICD-10-CM

## 2020-11-04 DIAGNOSIS — E11.9 CONTROLLED TYPE 2 DIABETES MELLITUS WITHOUT COMPLICATION, WITHOUT LONG-TERM CURRENT USE OF INSULIN (HCC): ICD-10-CM

## 2020-11-04 DIAGNOSIS — F43.9 STRESS: ICD-10-CM

## 2020-11-04 DIAGNOSIS — F13.20 BENZODIAZEPINE DEPENDENCE (HCC): ICD-10-CM

## 2020-11-04 DIAGNOSIS — Z23 NEED FOR VACCINATION: ICD-10-CM

## 2020-11-04 DIAGNOSIS — F51.01 PRIMARY INSOMNIA: ICD-10-CM

## 2020-11-04 DIAGNOSIS — Z12.11 SPECIAL SCREENING FOR MALIGNANT NEOPLASM OF COLON: ICD-10-CM

## 2020-11-04 DIAGNOSIS — Z12.31 ENCOUNTER FOR SCREENING MAMMOGRAM FOR BREAST CANCER: ICD-10-CM

## 2020-11-04 DIAGNOSIS — E66.01 MORBID OBESITY WITH BMI OF 40.0-44.9, ADULT (HCC): ICD-10-CM

## 2020-11-04 PROCEDURE — 90732 PPSV23 VACC 2 YRS+ SUBQ/IM: CPT | Performed by: NURSE PRACTITIONER

## 2020-11-04 PROCEDURE — 90471 IMMUNIZATION ADMIN: CPT | Performed by: NURSE PRACTITIONER

## 2020-11-04 PROCEDURE — 90686 IIV4 VACC NO PRSV 0.5 ML IM: CPT | Performed by: NURSE PRACTITIONER

## 2020-11-04 PROCEDURE — 82570 ASSAY OF URINE CREATININE: CPT

## 2020-11-04 PROCEDURE — 90746 HEPB VACCINE 3 DOSE ADULT IM: CPT | Performed by: NURSE PRACTITIONER

## 2020-11-04 PROCEDURE — 82043 UR ALBUMIN QUANTITATIVE: CPT

## 2020-11-04 PROCEDURE — 90472 IMMUNIZATION ADMIN EACH ADD: CPT | Performed by: NURSE PRACTITIONER

## 2020-11-04 PROCEDURE — 99214 OFFICE O/P EST MOD 30 MIN: CPT | Mod: 25 | Performed by: NURSE PRACTITIONER

## 2020-11-04 RX ORDER — TRAZODONE HYDROCHLORIDE 100 MG/1
100-150 TABLET ORAL EVERY EVENING
Qty: 45 TAB | Refills: 3 | Status: SHIPPED
Start: 2020-11-04 | End: 2021-01-18

## 2020-11-04 RX ORDER — PHENTERMINE HYDROCHLORIDE 15 MG/1
CAPSULE ORAL
Qty: 30 CAP | Refills: 1 | Status: SHIPPED | OUTPATIENT
Start: 2020-11-04 | End: 2020-12-02

## 2020-11-04 ASSESSMENT — FIBROSIS 4 INDEX: FIB4 SCORE: 0.73

## 2020-11-04 NOTE — PROGRESS NOTES
"Chief Complaint   Patient presents with   • Immunizations       HPI   Tammie is a 50-year-old established female here to follow-up on chronic issues.    Insomnia  Chronic issue. Taking 150 mg amitriptyline alternating with 50 mg depending on the night.  Gets wired with ambien / sonata / lunesta.  Has weight gain with amitriptyline.  Currently getting 8-10 hours of sleep at night.      Anxiety  Chronic issue.  Worsening with home situation.  Takes as needed xanax - anywhere from 0-3 per day depending on her day.  Has taken Xanax for many years and has struggled to refrain from taking it, despite trials of daily SSRI therapy.    Controlled type 2 diabetes mellitus without complication (CMS-HCC)  Chronic issue.  Also followed by endocrinology because of her diagnoses of panhypopituitarism.  Compliant with Jardiance.  Last A1c was 5.4 in July.  Does not check home blood sugars.    BMI 45.0-49.9, adult ([facility])  She is also very concerned about this.  She would like to change from amitriptyline to a different sleep medication as mentioned previously.  She gets extremely hungry at night and overeats.        Past medical, surgical, family, and social history is reviewed and updated in Epic chart by me today.   Medications and allergies reviewed and updated in Epic chart by me today.     ROS:   As documented in history of present illness above    Exam:  /82 (BP Location: Left arm, Patient Position: Sitting, BP Cuff Size: Adult)   Pulse 86   Temp 36.5 °C (97.7 °F) (Temporal)   Resp 16   Ht 1.651 m (5' 5\")   Wt (!) 129.3 kg (285 lb)   SpO2 93%   Constitutional: Obese female, alert, no distress, plus 3 vital signs  Skin:  Warm, dry, no rashes invisible areas  Eye: Equal, round and reactive, conjunctiva clear  ENMT: Lips without lesions.  Respiratory: Unlabored respiration.  Cardiovascular: Normal rate and rhythm.  Psych: Alert, pleasant, well-groomed, normal affect  Monofilament testing with a 10 gram force: " sensation intact: intact bilaterally  Visual Inspection: Feet without maceration, ulcers, fissures.  Pedal pulses: intact bilaterally    Assessment / Plan / Medical Decision makin. Primary insomnia  -Trial of changing to trazodone from amitriptyline.  Discussed potential side effects, particularly with changing medications despite being in similar families.  Encouraged her to follow-up here either in person, via email or zoom within the next few weeks.  - traZODone (DESYREL) 100 MG Tab; Take 1-1.5 Tabs by mouth every evening.  Dispense: 45 Tab; Refill: 3    2. Special screening for malignant neoplasm of colon  - SHANELLE (FIT DNA)    3. Encounter for screening mammogram for breast cancer  - MA-SCREENING MAMMO BILAT W/CAD; Future    4. Anxiety  -Persistent.  Hopefully helping her with weight loss through changing amitriptyline to trazodone will decrease her anxiety.  She always has an ultimate goal of limiting her use of Xanax and will continue to refrain from taking Xanax unless absolutely necessary.  Has no interest in a retrial of any type of SSRI or SNRI therapy.  - REFERRAL TO BEHAVIORAL HEALTH    5. Stress  -Patient requested a referral to start seeing a therapist as she lives with her in-laws, is her caretaker and both of them have Alzheimer's.  - REFERRAL TO BEHAVIORAL HEALTH    6. Morbid obesity with BMI of 40.0-44.9, adult (HCC)  -She has responded well to phentermine in the past in terms of appetite suppression.  We discussed that phentermine could exacerbate her anxiety and she verbalized understanding, stating that it typically does not.  - phentermine 15 MG capsule; TAKE ONE CAPSULE BY MOUTH EVERY MORNING FOR 30 DAYS  Dispense: 30 Cap; Refill: 1    7. Need for vaccination  -The patient was counseled regarding all immunizations, what the patient is being immunized against, possible side effects, and the importance of immunization.  - Hepatitis B Vaccine Adult 20+  - Influenza Vaccine Quad  Injection (PF)  - Pneumovax Vaccine (PPSV23)    8. Controlled type 2 diabetes mellitus without complication, without long-term current use of insulin (HCC)  -A1c is excellent.  Continue Jardiance.  Continues to follow-up with endocrinology.  - MICROALBUMIN CREAT RATIO URINE; Future    9. Benzodiazepine dependence (HCC)  -In prescribing controlled substances to this patient, I certify that I have obtained and reviewed the medical history of Bella Garibay. I have also made a good luis armando effort to obtain applicable records from other providers who have treated the patient and records did not demonstrate any increased risk of substance abuse that would prevent me from prescribing controlled substances.     I have conducted a physical exam and documented it. I have reviewed Ms. Garibay’s prescription history as maintained by the [place] Prescription Monitoring Program.      - Pain Management Screen; Future

## 2020-11-04 NOTE — ASSESSMENT & PLAN NOTE
Chronic issue.  Worsening with home situation.  Takes as needed xanax - anywhere from 0-3 per day depending on her day.  Has taken Xanax for many years and has struggled to refrain from taking it, despite trials of daily SSRI therapy.

## 2020-11-04 NOTE — ASSESSMENT & PLAN NOTE
Chronic issue. Taking 150 mg amitriptyline alternating with 50 mg depending on the night.  Gets wired with ambien / sonata / lunesta.  Has weight gain with amitriptyline.  Currently getting 8-10 hours of sleep at night.

## 2020-11-05 LAB
AMBIGUOUS DTTM AMBI4: NORMAL
CREAT UR-MCNC: 53.65 MG/DL
MICROALBUMIN UR-MCNC: <1.2 MG/DL
MICROALBUMIN/CREAT UR: NORMAL MG/G (ref 0–30)
SIGNIFICANT IND 70042: NORMAL
SITE SITE: NORMAL
SOURCE SOURCE: NORMAL

## 2020-11-05 NOTE — ASSESSMENT & PLAN NOTE
Chronic issue.  Also followed by endocrinology because of her diagnoses of panhypopituitarism.  Compliant with Jardiance.  Last A1c was 5.4 in July.  Does not check home blood sugars.

## 2020-11-05 NOTE — ASSESSMENT & PLAN NOTE
She is also very concerned about this.  She would like to change from amitriptyline to a different sleep medication as mentioned previously.  She gets extremely hungry at night and overeats.

## 2020-11-25 DIAGNOSIS — R19.5 POSITIVE COLORECTAL CANCER SCREENING USING COLOGUARD TEST: ICD-10-CM

## 2020-12-09 DIAGNOSIS — F41.9 ANXIETY: ICD-10-CM

## 2020-12-09 DIAGNOSIS — F51.01 PRIMARY INSOMNIA: ICD-10-CM

## 2020-12-09 RX ORDER — AMITRIPTYLINE HYDROCHLORIDE 50 MG/1
TABLET, FILM COATED ORAL
Qty: 90 TAB | Refills: 3 | Status: SHIPPED | OUTPATIENT
Start: 2020-12-09 | End: 2022-08-25 | Stop reason: SDUPTHER

## 2020-12-09 RX ORDER — ALPRAZOLAM 1 MG/1
TABLET ORAL
Qty: 90 TAB | Refills: 0 | Status: SHIPPED | OUTPATIENT
Start: 2020-12-09 | End: 2021-01-08

## 2020-12-09 NOTE — TELEPHONE ENCOUNTER
Received request via: Pharmacy    Was the patient seen in the last year in this department? Yes  11/4/20  Does the patient have an active prescription (recently filled or refills available) for medication(s) requested? No

## 2020-12-23 ENCOUNTER — HOSPITAL ENCOUNTER (OUTPATIENT)
Facility: MEDICAL CENTER | Age: 50
End: 2020-12-23
Attending: PHYSICIAN ASSISTANT
Payer: COMMERCIAL

## 2020-12-23 ENCOUNTER — OFFICE VISIT (OUTPATIENT)
Dept: URGENT CARE | Facility: CLINIC | Age: 50
End: 2020-12-23
Payer: COMMERCIAL

## 2020-12-23 VITALS
TEMPERATURE: 97.2 F | HEART RATE: 95 BPM | BODY MASS INDEX: 45.48 KG/M2 | OXYGEN SATURATION: 94 % | WEIGHT: 283 LBS | SYSTOLIC BLOOD PRESSURE: 126 MMHG | DIASTOLIC BLOOD PRESSURE: 80 MMHG | RESPIRATION RATE: 20 BRPM | HEIGHT: 66 IN

## 2020-12-23 DIAGNOSIS — J98.8 RTI (RESPIRATORY TRACT INFECTION): ICD-10-CM

## 2020-12-23 PROCEDURE — U0003 INFECTIOUS AGENT DETECTION BY NUCLEIC ACID (DNA OR RNA); SEVERE ACUTE RESPIRATORY SYNDROME CORONAVIRUS 2 (SARS-COV-2) (CORONAVIRUS DISEASE [COVID-19]), AMPLIFIED PROBE TECHNIQUE, MAKING USE OF HIGH THROUGHPUT TECHNOLOGIES AS DESCRIBED BY CMS-2020-01-R: HCPCS

## 2020-12-23 PROCEDURE — 99214 OFFICE O/P EST MOD 30 MIN: CPT | Performed by: PHYSICIAN ASSISTANT

## 2020-12-23 RX ORDER — CODEINE PHOSPHATE AND GUAIFENESIN 10; 100 MG/5ML; MG/5ML
5 SOLUTION ORAL EVERY 6 HOURS PRN
Qty: 120 ML | Refills: 0 | Status: SHIPPED | OUTPATIENT
Start: 2020-12-23 | End: 2020-12-29

## 2020-12-23 RX ORDER — AZITHROMYCIN 250 MG/1
TABLET, FILM COATED ORAL
Qty: 6 TAB | Refills: 0 | Status: SHIPPED
Start: 2020-12-23 | End: 2021-01-18

## 2020-12-23 ASSESSMENT — ENCOUNTER SYMPTOMS
COUGH: 1
SORE THROAT: 1
SWOLLEN GLANDS: 0
WHEEZING: 1
FOCAL WEAKNESS: 0
NAUSEA: 0
TINGLING: 0
SENSORY CHANGE: 0
SHORTNESS OF BREATH: 0
ANOREXIA: 0
ABDOMINAL PAIN: 0
SPUTUM PRODUCTION: 1
FATIGUE: 0
MYALGIAS: 0
HEADACHES: 1
DIARRHEA: 0
VOMITING: 0
FEVER: 0
ARTHRALGIAS: 0
CHILLS: 0
PALPITATIONS: 0
SINUS PAIN: 1

## 2020-12-23 NOTE — PROGRESS NOTES
"Subjective:      Bella Garibay is a 50 y.o. female who presents with Sinus Pain (x4 days), Cough, and Nasal Drainage            Sinus Pain  This is a new problem. The current episode started in the past 7 days (4 days). The problem occurs constantly. The problem has been gradually worsening. Associated symptoms include congestion, coughing (productive with green mucous), headaches and a sore throat. Pertinent negatives include no abdominal pain, anorexia, arthralgias, chest pain, chills, fatigue, fever, myalgias, nausea, rash, swollen glands, urinary symptoms or vomiting. Nothing aggravates the symptoms. Treatments tried: Mucinex  The treatment provided mild relief.       Past Medical History:   Diagnosis Date   • Anesthesia     \"hard to sedate\"   • Arthritis     Osteo to lower back   • Bowel habit changes     7/30/20-constipation   • Bronchitis 6/17/2014   • Dental disorder     All dental implants-upper permanent and lower snap in.   • Diabetes (HCC)      7/30/20-Does not monitor glucose at home.    • Early menopause     @ 39   • GERD (gastroesophageal reflux disease)     occasional reflux   • History of traumatic brain injury 2003    Slipped into bathtub. Rendered pituitary gland non-functional.   • Hypothyroid 7/24/2009   • Obesity 7/24/2009   • Psychiatric problem     anxiety and depression S/P TBI and wt gain.        Past Surgical History:   Procedure Laterality Date   • DENTAL RESTORATION  10/2017    dental implants to all teeth   • PRIMARY C SECTION  1992   • DENTAL EXTRACTION(S)  1980's    wisdom teeth       Family History   Problem Relation Age of Onset   • Alcohol/Drug Mother         accidental overdose   • Cancer Mother         breast   • Cancer Maternal Uncle         liver   • Cancer Maternal Aunt         breast   • Cancer Maternal Grandmother         breast       No Known Allergies    Medications, Allergies, and current problem list reviewed today in Epic    Review of Systems   Constitutional: " "Positive for malaise/fatigue. Negative for chills, fatigue and fever.   HENT: Positive for congestion, sinus pain and sore throat. Negative for ear discharge and ear pain.    Respiratory: Positive for cough (productive with green mucous), sputum production and wheezing. Negative for shortness of breath.    Cardiovascular: Negative for chest pain, palpitations and leg swelling.   Gastrointestinal: Negative for abdominal pain, anorexia, diarrhea, nausea and vomiting.   Musculoskeletal: Negative for arthralgias and myalgias.   Skin: Negative for rash.   Neurological: Positive for headaches. Negative for tingling, sensory change and focal weakness.     All other systems reviewed and are negative.        Objective:     /80 (BP Location: Left arm, Patient Position: Sitting, BP Cuff Size: Large adult)   Pulse 95   Temp 36.2 °C (97.2 °F)   Resp 20   Ht 1.664 m (5' 5.5\")   Wt (!) 128.4 kg (283 lb)   SpO2 94%   BMI 46.38 kg/m²      Physical Exam  HENT:      Head: Normocephalic and atraumatic.      Nose: Congestion and rhinorrhea present.      Right Sinus: Maxillary sinus tenderness present.      Left Sinus: Maxillary sinus tenderness present.      Mouth/Throat:      Mouth: Mucous membranes are moist.      Pharynx: No posterior oropharyngeal erythema.   Eyes:      Conjunctiva/sclera: Conjunctivae normal.   Cardiovascular:      Rate and Rhythm: Normal rate and regular rhythm.      Heart sounds: Normal heart sounds. No murmur. No friction rub. No gallop.    Pulmonary:      Effort: No respiratory distress.      Breath sounds: No stridor. Wheezing and rhonchi present. No rales.      Comments: Deep spasmodic cough   Musculoskeletal: Normal range of motion.   Lymphadenopathy:      Cervical: No cervical adenopathy.   Skin:     General: Skin is warm and dry.      Findings: No rash.   Neurological:      General: No focal deficit present.      Mental Status: She is alert and oriented to person, place, and time. "   Psychiatric:         Mood and Affect: Mood normal.         Behavior: Behavior normal.         Thought Content: Thought content normal.         Judgment: Judgment normal.                 Assessment/Plan:        1. RTI (respiratory tract infection)    - COVID/SARS COV-2 PCR; Future  COVID test pending.   Isolation guidelines, conservative measures and ER precautions discussed.     COVID handout given to patient.    - azithromycin (ZITHROMAX) 250 MG Tab; Take as directed on package. 1 pack.  Dispense: 6 Tab; Refill: 0  - guaifenesin-codeine (ROBITUSSIN AC) Solution oral solution; Take 5 mL by mouth every 6 hours as needed for Cough for up to 6 days.  Dispense: 120 mL; Refill: 0  Sedation side effects discussed. No Driving or alcohol with medication given.  Advised patient to not mix Cough syrup with alprazolam.  Discussed risk of respiratory depression with combination of the two classes of medication      Differential diagnoses, Supportive care, and indications for immediate follow-up discussed with patient.   Pathogenesis of diagnosis discussed including typical length and natural progression.   Instructed to return to clinic or nearest emergency department for any change in condition, further concerns, or worsening of symptoms.    The patient demonstrated a good understanding and agreed with the treatment plan.    Dayana Borjas P.A.-C.  .

## 2020-12-24 DIAGNOSIS — J98.8 RTI (RESPIRATORY TRACT INFECTION): ICD-10-CM

## 2020-12-24 LAB
COVID ORDER STATUS COVID19: NORMAL
SARS-COV-2 RNA RESP QL NAA+PROBE: NOTDETECTED
SPECIMEN SOURCE: NORMAL

## 2020-12-31 DIAGNOSIS — M54.41 CHRONIC BILATERAL LOW BACK PAIN WITH RIGHT-SIDED SCIATICA: ICD-10-CM

## 2020-12-31 DIAGNOSIS — G89.29 CHRONIC BILATERAL LOW BACK PAIN WITH RIGHT-SIDED SCIATICA: ICD-10-CM

## 2020-12-31 NOTE — TELEPHONE ENCOUNTER
----- Message from Bella Garibay sent at 12/30/2020  5:46 PM PST -----  Regarding: Non-Urgent Medical Question  Contact: [phone]  Juan Oglesby I'm out of refills for my APAP/codeine. I use this medication for my lower back (due to my bust line) plus I strained an inner oblique muscle coughing. I'm at the tail end of a nasal infection. It feels like a stabbing when I cough but I can bend over and move around pretty well with little pain. Thank you

## 2021-01-10 DIAGNOSIS — F41.9 ANXIETY: ICD-10-CM

## 2021-01-11 ENCOUNTER — PATIENT MESSAGE (OUTPATIENT)
Dept: MEDICAL GROUP | Facility: LAB | Age: 51
End: 2021-01-11

## 2021-01-11 DIAGNOSIS — F41.9 ANXIETY: ICD-10-CM

## 2021-01-11 RX ORDER — ALPRAZOLAM 1 MG/1
TABLET ORAL
Qty: 90 TAB | Refills: 1 | Status: SHIPPED | OUTPATIENT
Start: 2021-01-11 | End: 2021-02-11

## 2021-01-11 RX ORDER — ALPRAZOLAM 1 MG/1
TABLET ORAL
Qty: 90 TAB | Refills: 0 | Status: SHIPPED | OUTPATIENT
Start: 2021-01-11 | End: 2021-02-10

## 2021-01-11 NOTE — PATIENT COMMUNICATION
Received request via: Patient    Was the patient seen in the last year in this department? Yes LOV 11/4/20    Does the patient have an active prescription (recently filled or refills available) for medication(s) requested? No

## 2021-01-18 ENCOUNTER — PATIENT MESSAGE (OUTPATIENT)
Dept: MEDICAL GROUP | Facility: LAB | Age: 51
End: 2021-01-18

## 2021-01-18 RX ORDER — AMITRIPTYLINE HYDROCHLORIDE 150 MG/1
TABLET ORAL
Qty: 90 TAB | Refills: 3 | Status: SHIPPED | OUTPATIENT
Start: 2021-01-18 | End: 2022-01-11 | Stop reason: SDUPTHER

## 2021-01-18 NOTE — PATIENT COMMUNICATION
Received request via: Patient    Was the patient seen in the last year in this department? Yes  11/4/2020  Does the patient have an active prescription (recently filled or refills available) for medication(s) requested? No

## 2021-01-21 ENCOUNTER — HOSPITAL ENCOUNTER (OUTPATIENT)
Dept: RADIOLOGY | Facility: MEDICAL CENTER | Age: 51
End: 2021-01-21
Attending: NURSE PRACTITIONER
Payer: COMMERCIAL

## 2021-01-21 DIAGNOSIS — Z12.31 ENCOUNTER FOR SCREENING MAMMOGRAM FOR BREAST CANCER: ICD-10-CM

## 2021-01-21 DIAGNOSIS — R51.9 GENERALIZED HEADACHE: ICD-10-CM

## 2021-01-21 PROCEDURE — 77063 BREAST TOMOSYNTHESIS BI: CPT

## 2021-01-21 RX ORDER — BUTALBITAL, ASPIRIN, AND CAFFEINE 325; 50; 40 MG/1; MG/1; MG/1
CAPSULE ORAL
Qty: 30 CAP | Refills: 0 | Status: SHIPPED | OUTPATIENT
Start: 2021-01-21 | End: 2021-02-20

## 2021-01-21 NOTE — TELEPHONE ENCOUNTER
Received request via: Pharmacy    Was the patient seen in the last year in this department? Yes  11/4/2020  Does the patient have an active prescription (recently filled or refills available) for medication(s) requested? No

## 2021-02-22 ENCOUNTER — PRE-ADMISSION TESTING (OUTPATIENT)
Dept: ADMISSIONS | Facility: MEDICAL CENTER | Age: 51
End: 2021-02-22
Attending: INTERNAL MEDICINE
Payer: COMMERCIAL

## 2021-02-22 DIAGNOSIS — Z01.812 PRE-OPERATIVE LABORATORY EXAMINATION: ICD-10-CM

## 2021-02-22 LAB
ANION GAP SERPL CALC-SCNC: 11 MMOL/L (ref 7–16)
BUN SERPL-MCNC: 19 MG/DL (ref 8–22)
CALCIUM SERPL-MCNC: 10.4 MG/DL (ref 8.5–10.5)
CHLORIDE SERPL-SCNC: 100 MMOL/L (ref 96–112)
CO2 SERPL-SCNC: 28 MMOL/L (ref 20–33)
CREAT SERPL-MCNC: 0.7 MG/DL (ref 0.5–1.4)
EST. AVERAGE GLUCOSE BLD GHB EST-MCNC: 114 MG/DL
GLUCOSE SERPL-MCNC: 85 MG/DL (ref 65–99)
HBA1C MFR BLD: 5.6 % (ref 0–5.6)
POTASSIUM SERPL-SCNC: 4.2 MMOL/L (ref 3.6–5.5)
SARS-COV-2 RNA RESP QL NAA+PROBE: NOTDETECTED
SODIUM SERPL-SCNC: 139 MMOL/L (ref 135–145)
SPECIMEN SOURCE: NORMAL

## 2021-02-22 PROCEDURE — 83036 HEMOGLOBIN GLYCOSYLATED A1C: CPT

## 2021-02-22 PROCEDURE — 80048 BASIC METABOLIC PNL TOTAL CA: CPT

## 2021-02-22 PROCEDURE — 36415 COLL VENOUS BLD VENIPUNCTURE: CPT

## 2021-02-22 PROCEDURE — U0003 INFECTIOUS AGENT DETECTION BY NUCLEIC ACID (DNA OR RNA); SEVERE ACUTE RESPIRATORY SYNDROME CORONAVIRUS 2 (SARS-COV-2) (CORONAVIRUS DISEASE [COVID-19]), AMPLIFIED PROBE TECHNIQUE, MAKING USE OF HIGH THROUGHPUT TECHNOLOGIES AS DESCRIBED BY CMS-2020-01-R: HCPCS

## 2021-02-22 PROCEDURE — U0005 INFEC AGEN DETEC AMPLI PROBE: HCPCS

## 2021-02-22 PROCEDURE — C9803 HOPD COVID-19 SPEC COLLECT: HCPCS

## 2021-02-22 RX ORDER — ALPRAZOLAM 1 MG/1
1 TABLET ORAL PRN
COMMUNITY
End: 2021-03-08

## 2021-02-22 RX ORDER — BUTALBITAL, ASPIRIN, AND CAFFEINE 325; 50; 40 MG/1; MG/1; MG/1
1 CAPSULE ORAL PRN
COMMUNITY
End: 2021-03-30

## 2021-02-25 ENCOUNTER — HOSPITAL ENCOUNTER (OUTPATIENT)
Facility: MEDICAL CENTER | Age: 51
End: 2021-02-25
Attending: INTERNAL MEDICINE | Admitting: INTERNAL MEDICINE
Payer: COMMERCIAL

## 2021-02-25 ENCOUNTER — ANESTHESIA EVENT (OUTPATIENT)
Dept: SURGERY | Facility: MEDICAL CENTER | Age: 51
End: 2021-02-25
Payer: COMMERCIAL

## 2021-02-25 ENCOUNTER — ANESTHESIA (OUTPATIENT)
Dept: SURGERY | Facility: MEDICAL CENTER | Age: 51
End: 2021-02-25
Payer: COMMERCIAL

## 2021-02-25 VITALS
OXYGEN SATURATION: 93 % | RESPIRATION RATE: 16 BRPM | HEART RATE: 88 BPM | DIASTOLIC BLOOD PRESSURE: 79 MMHG | SYSTOLIC BLOOD PRESSURE: 128 MMHG | WEIGHT: 286.6 LBS | BODY MASS INDEX: 47.75 KG/M2 | HEIGHT: 65 IN | TEMPERATURE: 97.7 F

## 2021-02-25 LAB
GLUCOSE BLD-MCNC: 109 MG/DL (ref 65–99)
PATHOLOGY CONSULT NOTE: NORMAL

## 2021-02-25 PROCEDURE — 160025 RECOVERY II MINUTES (STATS): Performed by: INTERNAL MEDICINE

## 2021-02-25 PROCEDURE — 160009 HCHG ANES TIME/MIN: Performed by: INTERNAL MEDICINE

## 2021-02-25 PROCEDURE — 160203 HCHG ENDO MINUTES - 1ST 30 MINS LEVEL 4: Performed by: INTERNAL MEDICINE

## 2021-02-25 PROCEDURE — 82962 GLUCOSE BLOOD TEST: CPT

## 2021-02-25 PROCEDURE — 160035 HCHG PACU - 1ST 60 MINS PHASE I: Performed by: INTERNAL MEDICINE

## 2021-02-25 PROCEDURE — 700111 HCHG RX REV CODE 636 W/ 250 OVERRIDE (IP): Performed by: ANESTHESIOLOGY

## 2021-02-25 PROCEDURE — 160046 HCHG PACU - 1ST 60 MINS PHASE II: Performed by: INTERNAL MEDICINE

## 2021-02-25 PROCEDURE — 160208 HCHG ENDO MINUTES - EA ADDL 1 MIN LEVEL 4: Performed by: INTERNAL MEDICINE

## 2021-02-25 PROCEDURE — 501629 HCHG TUBE, LUKI TRAP STERILE DISP: Performed by: INTERNAL MEDICINE

## 2021-02-25 PROCEDURE — 160048 HCHG OR STATISTICAL LEVEL 1-5: Performed by: INTERNAL MEDICINE

## 2021-02-25 PROCEDURE — 88305 TISSUE EXAM BY PATHOLOGIST: CPT

## 2021-02-25 PROCEDURE — A9270 NON-COVERED ITEM OR SERVICE: HCPCS | Performed by: INTERNAL MEDICINE

## 2021-02-25 PROCEDURE — 700105 HCHG RX REV CODE 258: Performed by: INTERNAL MEDICINE

## 2021-02-25 PROCEDURE — 700101 HCHG RX REV CODE 250: Performed by: INTERNAL MEDICINE

## 2021-02-25 PROCEDURE — 160002 HCHG RECOVERY MINUTES (STAT): Performed by: INTERNAL MEDICINE

## 2021-02-25 RX ORDER — SODIUM CHLORIDE, SODIUM LACTATE, POTASSIUM CHLORIDE, CALCIUM CHLORIDE 600; 310; 30; 20 MG/100ML; MG/100ML; MG/100ML; MG/100ML
INJECTION, SOLUTION INTRAVENOUS CONTINUOUS
Status: DISCONTINUED | OUTPATIENT
Start: 2021-02-25 | End: 2021-02-25 | Stop reason: HOSPADM

## 2021-02-25 RX ORDER — DIPHENHYDRAMINE HYDROCHLORIDE 50 MG/ML
12.5 INJECTION INTRAMUSCULAR; INTRAVENOUS
Status: DISCONTINUED | OUTPATIENT
Start: 2021-02-25 | End: 2021-02-25 | Stop reason: HOSPADM

## 2021-02-25 RX ORDER — ONDANSETRON 2 MG/ML
4 INJECTION INTRAMUSCULAR; INTRAVENOUS
Status: DISCONTINUED | OUTPATIENT
Start: 2021-02-25 | End: 2021-02-25 | Stop reason: HOSPADM

## 2021-02-25 RX ORDER — HALOPERIDOL 5 MG/ML
1 INJECTION INTRAMUSCULAR
Status: DISCONTINUED | OUTPATIENT
Start: 2021-02-25 | End: 2021-02-25 | Stop reason: HOSPADM

## 2021-02-25 RX ADMIN — PROPOFOL 50 MG: 10 INJECTION, EMULSION INTRAVENOUS at 10:07

## 2021-02-25 RX ADMIN — POVIDONE IODINE 15 ML: 100 SOLUTION TOPICAL at 08:52

## 2021-02-25 RX ADMIN — SODIUM CHLORIDE, POTASSIUM CHLORIDE, SODIUM LACTATE AND CALCIUM CHLORIDE: 600; 310; 30; 20 INJECTION, SOLUTION INTRAVENOUS at 08:52

## 2021-02-25 RX ADMIN — PROPOFOL 150 MCG/KG/MIN: 10 INJECTION, EMULSION INTRAVENOUS at 09:57

## 2021-02-25 RX ADMIN — PROPOFOL 100 MG: 10 INJECTION, EMULSION INTRAVENOUS at 10:00

## 2021-02-25 ASSESSMENT — PAIN SCALES - GENERAL: PAIN_LEVEL: 0

## 2021-02-25 NOTE — DISCHARGE INSTRUCTIONS
ENDOSCOPY HOME CARE INSTRUCTIONS        COLONOSCOPY OR FLEXIBLE SIGMOIDOSCOPY  1. If you received a barium enema, take a mild laxative such as dulcolax, Jessica's M.O., or Milk of Magnesia to clean out the barium.  2. Drink plenty of fluids. Eat a diet high in fiber (whole-grain breads, fresh fruit and vegetables).  3. You may notice a few drops of blood with your first bowel movement. If you develop any large amount of bleeding, black stools, a fever, or abdominal pain, call your doctor right away.  4. Call your doctor for test results in 2 day(s).  5. Don't drive or drink alcohol for 24 hours. The medication you received will make you too drowsy.      You should call 911 if you develop problems with breathing or chest pain.  If any questions arise, call your doctor. If your doctor is not available, please feel free to call [phone]. You can also call the HEALTH HOTLINE open 24 hours/day, 7 days/week and speak to a nurse at [phone], or toll free [phone].    Depression / Suicide Risk    As you are discharged from this [facility] facility, it is important to learn how to keep safe from harming yourself.    Recognize the warning signs:  · Abrupt changes in personality, positive or negative- including increase in energy   · Giving away possessions  · Change in eating patterns- significant weight changes-  positive or negative  · Change in sleeping patterns- unable to sleep or sleeping all the time   · Unwillingness or inability to communicate  · Depression  · Unusual sadness, discouragement and loneliness  · Talk of wanting to die  · Neglect of personal appearance   · Rebelliousness- reckless behavior  · Withdrawal from people/activities they love  · Confusion- inability to concentrate     If you or a loved one observes any of these behaviors or has concerns about self-harm, here's what you can do:  · Talk about it- your feelings and reasons for harming yourself  · Remove any means that you might  use to hurt yourself (examples: pills, rope, extension cords, firearm)  · Get professional help from the community (Mental Health, Substance Abuse, psychological counseling)  · Do not be alone:Call your Safe Contact- someone whom you trust who will be there for you.  · Call your local CRISIS HOTLINE 046-4796 or 351-394-3839  · Call your local Children's Mobile Crisis Response Team [place] (689) 721-3163 or www.ScreenHits  · Call the toll free National Suicide Prevention Hotlines   · National Suicide Prevention Lifeline 636-617-RJJK (9497)  · National Hope Line Network 800-SUICIDE (656-7052)    I acknowledge receipt and understanding of these Home Care Instructions.

## 2021-02-25 NOTE — ANESTHESIA PREPROCEDURE EVALUATION
Relevant Problems   ENDO   (+) Controlled type 2 diabetes mellitus without complication (HCC)   (+) Hypothyroidism due to acquired atrophy of thyroid       Physical Exam    Airway   Mallampati: II  TM distance: >3 FB  Neck ROM: full       Cardiovascular - normal exam  Rhythm: regular  Rate: normal     Dental    Pulmonary   Breath sounds clear to auscultation     Abdominal    Neurological - normal exam                 Anesthesia Plan    ASA 3   ASA physical status 3 criteria: morbid obesity - BMI greater than or equal to 40    Plan - MAC               Induction: intravenous      Pertinent diagnostic labs and testing reviewed    Informed Consent:    Anesthetic plan and risks discussed with patient.

## 2021-02-25 NOTE — PROCEDURES
DATE OF PROCEDURE:  2021     PROCEDURE:  Colonoscopy with snare polypectomy.     ENDOSCOPIST:  Jem Rick MD     INDICATIONS:  Occult blood in stool.     MEDICATIONS:  The patient received general anesthesia.  Please see anesthesia   flow sheets for details.     ANESTHESIOLOGIST:   Abebe Shankar MD     DESCRIPTION OF PROCEDURE:  The patient was informed in detail of the   indications as well as the risks, the benefits, the alternatives of the   procedure and she indicates understanding of all this and agrees to proceed.    Consent is signed and placed in the chart.  After the patient was deemed   adequately sedated, a standard Olympus variable stiffness adult colonoscope   was lubricated and gently placed in the anus and from here carefully   maneuvered throughout the colon into the cecum.  Cecal landmarks identified   included the ileocecal valve, cecal strap, and appendiceal orifice.  Quality   of preparation was fair, adequate.  The scope was then slowly and carefully   withdrawn looking mucosa in a circumferential methodic manner.  In the sigmoid   colon, two small, approximately 4 mm polyps were found and removed via cold   snare polypectomy technique.  Retroflexion could not be performed.  Air was   suctioned out of the colon.  It should be noted that the overall procedure was   rather difficult.  She has a very floppy colon with a suboptimal preparation   result requiring multiple position changes pressures and prolonged time.     COMPLICATIONS:  None.     TISSUE/BIOPSIES:  Sigmoid colon polyps.     THERAPY:  None.     FINDINGS:  Sigmoid colon polyps.     IMPRESSION/PLAN/MEDICAL DECISION MAKIN.  Occult blood in stool.  Etiology for this is unknown.  I am not convinced   that the size of the small polyps is large enough to turn the FIT test positive hence   most likely is a false positive FIT test.  I will discuss all of this with the   patient.  Although the preparation was suboptimal,  extra time was spent   flushing and suctioning to adequately uncover mucosa.  I do not believe anything   larger than 5 mm could have been missed.  2.  Sigmoid colon polyps.  3.  Stage III obesity.        ______________________________  MD HATTIE ACOSTA/VERONICA    DD:  02/25/2021 10:51  DT:  02/25/2021 11:40    Job#:  [id number]

## 2021-02-25 NOTE — ANESTHESIA TIME REPORT
Anesthesia Start and Stop Event Times     Date Time Event    2/25/2021 0952 Ready for Procedure     0954 Anesthesia Start     1045 Anesthesia Stop        Responsible Staff  02/25/21    Name Role Begin End    Abebe Shankar M.D. Anesth 0954 1045        Preop Diagnosis (Free Text):  Pre-op Diagnosis     CONSTIPATION, OCCULT BLOOD, PELVIC FLOOR DYSFUNCTION        Preop Diagnosis (Codes):    Post op Diagnosis  Occult blood in stools      Premium Reason  Non-Premium    Comments:

## 2021-02-25 NOTE — OR SURGEON
Immediate Post OP Note    PreOp Diagnosis: Positive FIT test    PostOp Diagnosis: 2 small sigmoid polyps - cold snare    Procedure(s):  COLONOSCOPY - Wound Class: Clean Contaminated  COLONOSCOPY, WITH POLYPECTOMY - Wound Class: Clean Contaminated    Surgeon(s):  Jem Rick M.D.    Anesthesiologist/Type of Anesthesia:  Anesthesiologist: Abebe Shankar M.D./General    Surgical Staff:  Circulator: Maria Del Carmen Roberto  Endoscopy Technician: Magalis Parks  Endoscopy Nurse: Gertrudis Quinteros R.N.; Shamar Walton R.N.    Specimens removed if any:  ID Type Source Tests Collected by Time Destination   A :  Polyp Colon - Sigmoid PATHOLOGY SPECIMEN Jem Rick M.D. 2/25/2021 10:32 AM        Estimated Blood Loss: Minimal    Findings: 1) 2 5mm sigmoid polyps - cold snare  2) Suboptimal preparation  3) Difficult procedure    Complications: None        2/25/2021 10:45 AM Jem Rick M.D.

## 2021-03-08 DIAGNOSIS — F41.9 ANXIETY: ICD-10-CM

## 2021-03-09 DIAGNOSIS — F41.9 ANXIETY: ICD-10-CM

## 2021-03-09 RX ORDER — ALPRAZOLAM 1 MG/1
1 TABLET ORAL 3 TIMES DAILY PRN
Qty: 90 TABLET | Refills: 0 | OUTPATIENT
Start: 2021-03-09 | End: 2021-04-08

## 2021-03-09 RX ORDER — ALPRAZOLAM 1 MG/1
TABLET ORAL
Qty: 90 TABLET | Refills: 0 | Status: SHIPPED | OUTPATIENT
Start: 2021-03-09 | End: 2021-04-08

## 2021-03-09 RX ORDER — LEVOTHYROXINE SODIUM 175 UG/1
175 TABLET ORAL
Qty: 90 TABLET | Refills: 3 | Status: SHIPPED | OUTPATIENT
Start: 2021-03-09 | End: 2022-01-11 | Stop reason: SDUPTHER

## 2021-03-09 NOTE — TELEPHONE ENCOUNTER
----- Message from Rashida Hendricks, Med Ass't sent at 3/9/2021  1:10 PM PST -----  Regarding: FW: Non-Urgent Medical Question  Contact: [phone]    ----- Message -----  From: Bella Garibay  Sent: 3/9/2021  12:18 PM PST  To: Cara Goodman Ma  Subject: Non-Urgent Medical Question                      Jackson Oglesby I see you refused my request for as for asper/codeine I have been using it regularly for my lower back. I had a bad fall and gained weight and I'm finding it difficult to do around the house chores and/or exercise without sharp pain. Plus my father-in-law passed away last month @92years old and we have had to put my  mother-in-law into in house hospice. The reason I'm telling you this is because of my lack of time to make an appointment. Plus your location. I am available for a zoom appointment per your request.   Thank you

## 2021-03-30 ENCOUNTER — TELEMEDICINE (OUTPATIENT)
Dept: MEDICAL GROUP | Facility: LAB | Age: 51
End: 2021-03-30
Payer: COMMERCIAL

## 2021-03-30 VITALS — WEIGHT: 293 LBS | HEIGHT: 65 IN | BODY MASS INDEX: 48.82 KG/M2

## 2021-03-30 DIAGNOSIS — E03.4 HYPOTHYROIDISM DUE TO ACQUIRED ATROPHY OF THYROID: ICD-10-CM

## 2021-03-30 DIAGNOSIS — G89.29 CHRONIC BILATERAL LOW BACK PAIN WITH RIGHT-SIDED SCIATICA: ICD-10-CM

## 2021-03-30 DIAGNOSIS — Z00.00 PREVENTATIVE HEALTH CARE: ICD-10-CM

## 2021-03-30 DIAGNOSIS — F41.9 ANXIETY: ICD-10-CM

## 2021-03-30 DIAGNOSIS — M54.41 CHRONIC BILATERAL LOW BACK PAIN WITH RIGHT-SIDED SCIATICA: ICD-10-CM

## 2021-03-30 PROCEDURE — 99214 OFFICE O/P EST MOD 30 MIN: CPT | Mod: 95,CR | Performed by: NURSE PRACTITIONER

## 2021-03-30 RX ORDER — PHENTERMINE HYDROCHLORIDE 15 MG/1
CAPSULE ORAL
COMMUNITY
Start: 2021-03-15 | End: 2021-06-17

## 2021-03-30 RX ORDER — ALPRAZOLAM 1 MG/1
1 TABLET ORAL 3 TIMES DAILY PRN
Qty: 90 TABLET | Refills: 2 | Status: SHIPPED | OUTPATIENT
Start: 2021-04-08 | End: 2021-05-16 | Stop reason: SDUPTHER

## 2021-03-30 RX ORDER — OMEPRAZOLE 20 MG/1
20 CAPSULE, DELAYED RELEASE ORAL DAILY
Qty: 30 CAPSULE | Refills: 5 | Status: SHIPPED | OUTPATIENT
Start: 2021-03-30 | End: 2022-01-11 | Stop reason: SDUPTHER

## 2021-03-30 NOTE — PROGRESS NOTES
"Telemedicine: Established Patient   This evaluation was conducted via Zoom using secure and encrypted videoconferencing technology. The patient was in a private location in the state of [place].    The patient's identity was confirmed and verbal consent was obtained for this virtual visit.    Subjective:   CC:   F/u    HPI:  Bella Garibay is a 50 y.o. female presenting for evaluation and management of:    Depression, chronic pain, obesity and anxiety.  States that she really is not doing very well, considering that her Father /mother in law both passed away in the past few months.  Pt has gained weight during pandemic.  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  Dr. Chowdary rx her phentermine 15 mg but this worsens anxiety.   Continues on jardiance daily for DM.  She is really looking forward to returning to the gym after Covid vaccination and lose weight.  Requesting refill of tylenol with codeine for her chronic back pain- taking about one per day.  Currently taking 800 mg ibuprofen which causes GI upset.  Prefers not to return to the [facility] for any type of epidural, has had 5 or 6 without improvement and wants to focus only on weight loss/getting healthier.  Still needs 3 xanax everyday- fearful to taper down off this but this is her goal in the next year after grief recovery, returning the gym and loosing weight.  Denies any negative side effects of Xanax.  Also continues on amitriptyline for sleep and Wellbutrin for depression.  Denies SI or HI.    Had colonoscopy completed 2/2021 - 2 polyps removed - repeat 3 years.      ROS  Negative except for HPI.    No Known Allergies    Current medicines (including changes today)  Current Outpatient Medications   Medication Sig Dispense Refill " "  • ALPRAZolam (XANAX) 1 MG Tab TAKE 1 TABLET BY MOUTH THREE TIMES DAILY AS NEEDED FOR UP TO 30 DAYS  90 tablet 0   • levothyroxine (SYNTHROID) 175 MCG Tab Take 1 tablet by mouth Every morning on an empty stomach. 90 tablet 3   • ASPIRIN-CAFFEINE-BUTALBITAL (FIORINAL) -40 MG Cap Take 1 capsule by mouth as needed for Migraine.     • amitriptyline (ELAVIL) 150 MG Tab TAKE ONE TABLET BY MOUTH AT BEDTIME AS NEEDED FOR SLEEP 90 Tab 3   • amitriptyline (ELAVIL) 50 MG Tab TAKE 1 TO 2 TABLETS BY MOUTH AT BEDTIME AS NEEDED FOR SLEEP 90 Tab 3   • buPROPion (WELLBUTRIN XL) 150 MG XL tablet Take 1 Tab by mouth every morning. 90 Tab 3   • cyanocobalamin (VITAMIN B-12) 1000 MCG/ML Solution 1,000 mcg by Injection route every 7 days.     • Empagliflozin (JARDIANCE) 25 MG Tab Take 1 Tab by mouth every day. 30 Tab 4   • Somatropin (GENOTROPIN) 0.2 MG SOLR Inject 4 mg as instructed every day. Non-functioning pituitary gland S/P  Each      No current facility-administered medications for this visit.       Patient Active Problem List    Diagnosis Date Noted   • Granuloma annulare 08/01/2019   • Hypopituitarism (HCC) - \"panhypopituitarism\" 03/14/2019   • Tinea versicolor 08/31/2018   • Other constipation 08/31/2018   • Controlled type 2 diabetes mellitus without complication ([facility]) 08/24/2017   • Chronic bilateral low back pain with right-sided sciatica 08/24/2017   • BMI 45.0-49.9, adult ([facility]) 08/15/2017   • Right knee pain 06/16/2016   • Hypothyroidism due to acquired atrophy of thyroid 11/06/2015   • Insomnia 03/20/2014   • Anxiety 09/06/2011   • Obesity 07/24/2009       Family History   Problem Relation Age of Onset   • Alcohol/Drug Mother         accidental overdose   • Cancer Mother         breast   • Cancer Maternal Uncle         liver   • Cancer Maternal Aunt         breast   • Cancer Maternal Grandmother         breast       She  has a past medical history of Anesthesia, Arthritis, Bowel habit changes, Bronchitis " "(6/17/2014), Dental disorder, Diabetes (HCC), Early menopause, GERD (gastroesophageal reflux disease), History of migraine, History of traumatic brain injury (2003), Hypothyroid (7/24/2009), Obesity (7/24/2009), Panhypopituitarism ([facility]), and Psychiatric problem.  She  has a past surgical history that includes dental restoration (10/2017); dental extraction(s) (1980's); primary c section (1995); pr colonoscopy,diagnostic (N/A, 2/25/2021); and colonoscopy with polyp (N/A, 2/25/2021).       Objective:   LMP  (LMP Unknown)     Physical Exam  Gen: NAD  Resp: nonlabored.  Able to speak in full sentences  Psy: pleasant / cooperative.   Neuro:  Alert and oriented x 3  Assessment and Plan:   The following treatment plan was discussed:   \"  1. Chronic bilateral low back pain with right-sided sciatica  acetaminophen-codeine #3 (TYLENOL #3) 300-30 MG Tab   2. Hypothyroidism due to acquired atrophy of thyroid  FREE THYROXINE   3. Anxiety  ALPRAZolam (XANAX) 1 MG Tab   4. Preventative health care  TSH    FREE THYROXINE    Lipid Profile    CBC WITH DIFFERENTIAL    Comp Metabolic Panel     Offered her referral to physiatry/pain management for her spine which she declines.  Agree that she would certainly benefit from returning to the gym, losing weight, eating healthy and overall getting overall healthier.  I did prescribe Tylenol with codeine, #30, with 2 refills for her to take 1 pill as needed during the day for severe pain.  She will stop ibuprofen as this is causing GI upset.  Recommend omeprazole for at least the next 30 days.  Discussed ER precautions with black or bloody stools.  Also recommend updated labs to include TSH and T4 considering her history of hypothyroidism.  Continues on Wellbutrin for depression and amitriptyline for sleep.  Refilled alprazolam and we discussed trying to slowly taper herself off of this by reducing by 1/4-1/2 of a pill every 2 to 3 weeks.  Discussed withdrawal from quickly stopping alprazolam. "  Declined seeing psychiatry.  Discussed addictive nature of benzodiazepines.  She will come in 6 months in person for Pap smear at which point we can do a urine drug screen and update controlled substance agreement.  She understands that she should not take Xanax within 6 hours of driving a car, operating machinery or drinking alcohol.  She understands the chemically dependent nature of Xanax.  In prescribing controlled substances to this patient, I certify that I have obtained and reviewed the medical history of Bella Garibay. I have also made a good luis armando effort to obtain applicable records from other providers who have treated the patient and records did not demonstrate any increased risk of substance abuse that would prevent me from prescribing controlled substances.     I have conducted a physical exam and documented it. I have reviewed Ms. Garibay’s prescription history as maintained by the [place] Prescription Monitoring Program.

## 2021-04-03 ENCOUNTER — IMMUNIZATION (OUTPATIENT)
Dept: FAMILY PLANNING/WOMEN'S HEALTH CLINIC | Facility: IMMUNIZATION CENTER | Age: 51
End: 2021-04-03
Payer: COMMERCIAL

## 2021-04-03 DIAGNOSIS — Z23 ENCOUNTER FOR VACCINATION: Primary | ICD-10-CM

## 2021-04-03 PROCEDURE — 91300 PFIZER SARS-COV-2 VACCINE: CPT

## 2021-04-03 PROCEDURE — 0001A PFIZER SARS-COV-2 VACCINE: CPT

## 2021-04-05 RX ORDER — BUPROPION HYDROCHLORIDE 150 MG/1
TABLET ORAL
Qty: 90 TABLET | Refills: 3 | Status: SHIPPED | OUTPATIENT
Start: 2021-04-05 | End: 2022-01-05 | Stop reason: SDUPTHER

## 2021-04-05 NOTE — TELEPHONE ENCOUNTER
Received request via: Pharmacy    Was the patient seen in the last year in this department? Yes  3/30/21  Does the patient have an active prescription (recently filled or refills available) for medication(s) requested? No

## 2021-04-24 ENCOUNTER — IMMUNIZATION (OUTPATIENT)
Dept: FAMILY PLANNING/WOMEN'S HEALTH CLINIC | Facility: IMMUNIZATION CENTER | Age: 51
End: 2021-04-24
Payer: COMMERCIAL

## 2021-04-24 DIAGNOSIS — Z23 ENCOUNTER FOR VACCINATION: Primary | ICD-10-CM

## 2021-04-24 PROCEDURE — 91300 PFIZER SARS-COV-2 VACCINE: CPT

## 2021-04-24 PROCEDURE — 0002A PFIZER SARS-COV-2 VACCINE: CPT

## 2021-05-10 ENCOUNTER — PATIENT MESSAGE (OUTPATIENT)
Dept: MEDICAL GROUP | Facility: LAB | Age: 51
End: 2021-05-10

## 2021-05-10 DIAGNOSIS — M77.11 EPICONDYLITIS, LATERAL, RIGHT: ICD-10-CM

## 2021-05-10 RX ORDER — IBUPROFEN 800 MG/1
800 TABLET ORAL EVERY 8 HOURS PRN
Qty: 60 TABLET | Refills: 3 | Status: SHIPPED | OUTPATIENT
Start: 2021-05-10 | End: 2022-01-11 | Stop reason: SDUPTHER

## 2021-05-10 NOTE — TELEPHONE ENCOUNTER
From: Belal Garibay  To: Nurse Practitioner Reny Dalton  Sent: 5/10/2021 10:39 AM PDT  Subject: Prescription Question    Hi guys. I'm trying to fill my Ibuprofen 800 mg tabs. Thank you I hope you are all doing well and staying safe

## 2021-05-10 NOTE — PATIENT COMMUNICATION
Refill Request:    Received request via: Patient    Was the patient seen in the last year in this department? Yes  Last Office Visit: 3/30/2021    Does the patient have an active prescription (recently filled or refills available) for medication(s) requested? No

## 2021-05-17 ENCOUNTER — TELEPHONE (OUTPATIENT)
Dept: MEDICAL GROUP | Facility: LAB | Age: 51
End: 2021-05-17

## 2021-05-17 NOTE — TELEPHONE ENCOUNTER
----- Message from Bella Garibay sent at 5/16/2021  6:46 PM PDT -----  Regarding: Non-Urgent Medical Question  Contact: [phone]  Sorry the refill is supposed to go to my pharmacy

## 2021-06-17 RX ORDER — PHENTERMINE HYDROCHLORIDE 15 MG/1
15 CAPSULE ORAL EVERY MORNING
Qty: 28 CAPSULE | Refills: 3 | Status: SHIPPED | OUTPATIENT
Start: 2021-06-17 | End: 2022-01-12 | Stop reason: SDUPTHER

## 2021-06-17 NOTE — TELEPHONE ENCOUNTER
Received request via: Pharmacy  3/30/2021LOV  Was the patient seen in the last year in this department? Yes    Does the patient have an active prescription (recently filled or refills available) for medication(s) requested? No

## 2021-07-12 ENCOUNTER — TELEMEDICINE (OUTPATIENT)
Dept: MEDICAL GROUP | Facility: LAB | Age: 51
End: 2021-07-12
Payer: COMMERCIAL

## 2021-07-12 VITALS — HEIGHT: 65 IN | TEMPERATURE: 96.8 F | BODY MASS INDEX: 47.48 KG/M2 | WEIGHT: 285 LBS | HEART RATE: 96 BPM

## 2021-07-12 DIAGNOSIS — F41.9 ANXIETY: ICD-10-CM

## 2021-07-12 DIAGNOSIS — E11.9 CONTROLLED TYPE 2 DIABETES MELLITUS WITHOUT COMPLICATION, WITHOUT LONG-TERM CURRENT USE OF INSULIN (HCC): ICD-10-CM

## 2021-07-12 DIAGNOSIS — E66.01 CLASS 3 SEVERE OBESITY WITH SERIOUS COMORBIDITY AND BODY MASS INDEX (BMI) OF 45.0 TO 49.9 IN ADULT, UNSPECIFIED OBESITY TYPE (HCC): ICD-10-CM

## 2021-07-12 DIAGNOSIS — E03.4 HYPOTHYROIDISM DUE TO ACQUIRED ATROPHY OF THYROID: ICD-10-CM

## 2021-07-12 PROCEDURE — 99214 OFFICE O/P EST MOD 30 MIN: CPT | Mod: 95,CR | Performed by: NURSE PRACTITIONER

## 2021-07-12 RX ORDER — ALPRAZOLAM 1 MG/1
1 TABLET ORAL 3 TIMES DAILY PRN
Qty: 90 TABLET | Refills: 2 | Status: SHIPPED | OUTPATIENT
Start: 2021-07-17 | End: 2021-08-16

## 2021-07-12 ASSESSMENT — PATIENT HEALTH QUESTIONNAIRE - PHQ9: CLINICAL INTERPRETATION OF PHQ2 SCORE: 0

## 2021-07-12 NOTE — PROGRESS NOTES
Telemedicine: Established Patient   This evaluation was conducted via Zoom using secure and encrypted videoconferencing technology. The patient was in a private location in the state of [place].    The patient's identity was confirmed and verbal consent was obtained for this virtual visit.    Subjective:   CC:   Chief Complaint   Patient presents with   • Medication Management       HPI:  Tammie is a 50 y.o. female presenting for evaluation and management of:    #1- Obesity: Chronic issue. Happy to report that she has lost 15 lbs.  Moods are better than normal and she feels this helps-despite losing her in-laws and her 's recent job loss.  Out / about / camping / rafting / working on house.  Denies heart palpitations or insomnia related to use of phentermine.    #2-anxiety:  Chronic issue.  Sleeping well with amitriptyline and xanax. Anxiety is controlled. Denies negative side effects of amitriptyline or Xanax. Trying hard to cut back on her reliance on xanax.     #3-hypothyroidism: Chronic issue. Taking 175 mcg levothyroxine every morning on empty stomach. Due for lab work.    ROS  Denies CP or trouble breathing.      No Known Allergies    Current medicines (including changes today)  Current Outpatient Medications   Medication Sig Dispense Refill   • phentermine 15 MG capsule Take 1 capsule by mouth every morning for 28 days. 28 capsule 3   • ibuprofen (MOTRIN) 800 MG Tab Take 1 tablet by mouth every 8 hours as needed for Mild Pain. 60 tablet 3   • buPROPion (WELLBUTRIN XL) 150 MG XL tablet TAKE  ONE TABLET BY MOUTH EVERY MORNING  90 tablet 3   • GENOTROPIN 1 MG Recon Soln      • omeprazole (PRILOSEC) 20 MG delayed-release capsule Take 1 capsule by mouth every day. In the morning before breakfast 30 capsule 5   • levothyroxine (SYNTHROID) 175 MCG Tab Take 1 tablet by mouth Every morning on an empty stomach. 90 tablet 3   • amitriptyline (ELAVIL) 150 MG Tab TAKE ONE TABLET BY MOUTH AT BEDTIME AS NEEDED FOR  "SLEEP 90 Tab 3   • amitriptyline (ELAVIL) 50 MG Tab TAKE 1 TO 2 TABLETS BY MOUTH AT BEDTIME AS NEEDED FOR SLEEP 90 Tab 3   • cyanocobalamin (VITAMIN B-12) 1000 MCG/ML Solution 1,000 mcg by Injection route every 7 days.     • Empagliflozin (JARDIANCE) 25 MG Tab Take 1 Tab by mouth every day. 30 Tab 4   • Somatropin (GENOTROPIN) 0.2 MG SOLR Inject 4 mg as instructed every day. Non-functioning pituitary gland S/P  Each      No current facility-administered medications for this visit.       Patient Active Problem List    Diagnosis Date Noted   • Granuloma annulare 08/01/2019   • Hypopituitarism (HCC) - \"panhypopituitarism\" 03/14/2019   • Tinea versicolor 08/31/2018   • Other constipation 08/31/2018   • Controlled type 2 diabetes mellitus without complication ([facility]) 08/24/2017   • Chronic bilateral low back pain with right-sided sciatica 08/24/2017   • BMI 45.0-49.9, adult ([facility]) 08/15/2017   • Right knee pain 06/16/2016   • Hypothyroidism due to acquired atrophy of thyroid 11/06/2015   • Insomnia 03/20/2014   • Anxiety 09/06/2011   • Obesity 07/24/2009       Family History   Problem Relation Age of Onset   • Alcohol/Drug Mother         accidental overdose   • Cancer Mother         breast   • Cancer Maternal Uncle         liver   • Cancer Maternal Aunt         breast   • Cancer Maternal Grandmother         breast       She  has a past medical history of Anesthesia, Arthritis, Bowel habit changes, Bronchitis (6/17/2014), Dental disorder, Diabetes (HCC), Early menopause, GERD (gastroesophageal reflux disease), History of migraine, History of traumatic brain injury (2003), Hypothyroid (7/24/2009), Obesity (7/24/2009), 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  She  has a past surgical history that includes dental restoration (10/2017); dental extraction(s) (1980's); primary c section (1995); pr colonoscopy,diagnostic (N/A, 2/25/2021); and colonoscopy with polyp (N/A, 2/25/2021).       Objective:   Pulse 96   " "Temp 36 °C (96.8 °F)   Ht 1.651 m (5' 5\")   Wt (!) 129 kg (285 lb)   LMP  (LMP Unknown)   BMI 47.43 kg/m²     Physical Exam  Gen: NAD  Resp: nonlabored.  Able to speak in full sentences  Psy: pleasant / cooperative.   Neuro:  Alert and oriented x 3  Assessment and Plan:   The following treatment plan was discussed:     1. Anxiety  ALPRAZolam (XANAX) 1 MG Tab   2. Hypothyroidism due to acquired atrophy of thyroid     3. Controlled type 2 diabetes mellitus without complication, without long-term current use of insulin (HCC)     4. Class 3 severe obesity with serious comorbidity and body mass index (BMI) of 45.0 to 49.9 in adult, unspecified obesity type (HCC)       She understands that she should not take Xanax within 6 hours of driving a car, operating machinery or drinking alcohol.  She understands the chemically dependent nature of Xanax.  Doing well with phentermine, successful in losing weight and still works hard on this. Encouraged her to stop taking phentermine if she begins to have heart palpitations, resting heart rate greater than 100, blood pressures consistently greater than 140/90 or chest pain.  Recommend full updated lab panel, orders are in the computer. Continue current dose of levothyroxine until then.  She is also followed by endocrinology and continues on Jardiance as well as Somatropin.    In prescribing controlled substances to this patient, I certify that I have obtained and reviewed the medical history of Bella Garibay. I have also made a good luis armando effort to obtain applicable records from other providers who have treated the patient and records did not demonstrate any increased risk of substance abuse that would prevent me from prescribing controlled substances.     I have conducted a physical exam and documented it. I have reviewed Ms. Garibay’s prescription history as maintained by the [place] Prescription Monitoring Program.               "

## 2021-08-04 ENCOUNTER — PATIENT MESSAGE (OUTPATIENT)
Dept: MEDICAL GROUP | Facility: LAB | Age: 51
End: 2021-08-04

## 2021-08-04 DIAGNOSIS — R51.9 GENERALIZED HEADACHE: ICD-10-CM

## 2021-08-04 RX ORDER — BUTALBITAL, ASPIRIN, AND CAFFEINE 325; 50; 40 MG/1; MG/1; MG/1
1 CAPSULE ORAL
Qty: 30 CAPSULE | Refills: 0 | Status: SHIPPED | OUTPATIENT
Start: 2021-08-04 | End: 2022-01-13 | Stop reason: SDUPTHER

## 2021-08-04 NOTE — TELEPHONE ENCOUNTER
From: Bella Garibay  To: Nurse Practitioner Reny Dalton  Sent: 8/4/2021 12:11 PM PDT  Subject: Prescription Question    Hi guys hope everyone is doing well can I get a prescription of Fiorinal for my massive headaches I've been getting with the smoke I just can't seem to break the cycle. Thank you.

## 2021-08-04 NOTE — PATIENT COMMUNICATION
Refill Request:    Received request via: Patient    Was the patient seen in the last year in this department? Yes  Last Office Visit: 7/12/2021    Does the patient have an active prescription (recently filled or refills available) for medication(s) requested? No

## 2021-08-23 ENCOUNTER — PATIENT MESSAGE (OUTPATIENT)
Dept: MEDICAL GROUP | Facility: LAB | Age: 51
End: 2021-08-23

## 2021-08-23 DIAGNOSIS — G44.221 CHRONIC TENSION-TYPE HEADACHE, INTRACTABLE: ICD-10-CM

## 2021-08-23 RX ORDER — CYCLOBENZAPRINE HCL 10 MG
10 TABLET ORAL 2 TIMES DAILY PRN
Qty: 180 TABLET | Refills: 1 | Status: SHIPPED | OUTPATIENT
Start: 2021-08-23 | End: 2022-01-11 | Stop reason: SDUPTHER

## 2021-08-23 NOTE — TELEPHONE ENCOUNTER
From: Bella Garibay  To: Nurse Practitioner Reny Dalton  Sent: 8/23/2021 1:21 PM PDT  Subject: Medication     Hi Reny. Can I get my flexeral refilled please? I would really appreciate it. We just got our new insurance from [employer] I have to see if you are on the provider list and then I need to do labs please.

## 2021-08-23 NOTE — PATIENT COMMUNICATION
Refill Request:    Received request via: Patient    Was the patient seen in the last year in this department? Yes  Last Office Visit: 7/12/2021 VV    Does the patient have an active prescription (recently filled or refills available) for medication(s) requested? No

## 2021-10-05 DIAGNOSIS — M54.41 CHRONIC BILATERAL LOW BACK PAIN WITH RIGHT-SIDED SCIATICA: ICD-10-CM

## 2021-10-05 DIAGNOSIS — G89.29 CHRONIC BILATERAL LOW BACK PAIN WITH RIGHT-SIDED SCIATICA: ICD-10-CM

## 2021-10-05 NOTE — TELEPHONE ENCOUNTER
----- Message from Bella Garibay sent at 10/5/2021  3:43 PM PDT -----  Regarding: Refill   Juan Oglesby me please get a refill for my aspirin codeine? Thank you very much I hope all is well. 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

## 2021-11-08 DIAGNOSIS — F41.9 ANXIETY: ICD-10-CM

## 2021-11-09 RX ORDER — ALPRAZOLAM 1 MG/1
TABLET ORAL
Qty: 90 TABLET | Refills: 1 | Status: SHIPPED | OUTPATIENT
Start: 2021-11-09 | End: 2022-01-25 | Stop reason: SDUPTHER

## 2021-11-09 NOTE — TELEPHONE ENCOUNTER
Received request via: Pharmacy    Was the patient seen in the last year in this department? Yes  LOV 07/12/2021 - Telemedicine  Does the patient have an active prescription (recently filled or refills available) for medication(s) requested? No

## 2021-12-18 ENCOUNTER — OFFICE VISIT (OUTPATIENT)
Dept: URGENT CARE | Facility: CLINIC | Age: 51
End: 2021-12-18
Payer: COMMERCIAL

## 2021-12-18 ENCOUNTER — HOSPITAL ENCOUNTER (OUTPATIENT)
Facility: MEDICAL CENTER | Age: 51
End: 2021-12-18
Attending: PHYSICIAN ASSISTANT
Payer: COMMERCIAL

## 2021-12-18 VITALS
DIASTOLIC BLOOD PRESSURE: 68 MMHG | SYSTOLIC BLOOD PRESSURE: 116 MMHG | BODY MASS INDEX: 48.82 KG/M2 | HEART RATE: 94 BPM | WEIGHT: 293 LBS | OXYGEN SATURATION: 91 % | TEMPERATURE: 97.6 F | RESPIRATION RATE: 16 BRPM | HEIGHT: 65 IN

## 2021-12-18 DIAGNOSIS — S39.012A STRAIN OF LUMBAR REGION, INITIAL ENCOUNTER: ICD-10-CM

## 2021-12-18 LAB
APPEARANCE UR: NORMAL
BILIRUB UR STRIP-MCNC: NEGATIVE MG/DL
COLOR UR AUTO: YELLOW
GLUCOSE UR STRIP.AUTO-MCNC: 500 MG/DL
KETONES UR STRIP.AUTO-MCNC: NEGATIVE MG/DL
LEUKOCYTE ESTERASE UR QL STRIP.AUTO: NEGATIVE
NITRITE UR QL STRIP.AUTO: NEGATIVE
PH UR STRIP.AUTO: 7 [PH] (ref 5–8)
PROT UR QL STRIP: NEGATIVE MG/DL
RBC UR QL AUTO: NEGATIVE
SP GR UR STRIP.AUTO: 1.02
UROBILINOGEN UR STRIP-MCNC: 0.2 MG/DL

## 2021-12-18 PROCEDURE — 87086 URINE CULTURE/COLONY COUNT: CPT

## 2021-12-18 PROCEDURE — 81002 URINALYSIS NONAUTO W/O SCOPE: CPT | Performed by: PHYSICIAN ASSISTANT

## 2021-12-18 PROCEDURE — 99213 OFFICE O/P EST LOW 20 MIN: CPT | Performed by: PHYSICIAN ASSISTANT

## 2021-12-19 DIAGNOSIS — S39.012A STRAIN OF LUMBAR REGION, INITIAL ENCOUNTER: ICD-10-CM

## 2021-12-19 ASSESSMENT — ENCOUNTER SYMPTOMS
BACK PAIN: 1
CONSTIPATION: 0
FEVER: 0
NAUSEA: 0
FLANK PAIN: 1
EYE PAIN: 0
MYALGIAS: 0
ABDOMINAL PAIN: 0
CHILLS: 0
SORE THROAT: 0
SHORTNESS OF BREATH: 0
HEADACHES: 0
VOMITING: 0
DIARRHEA: 0
COUGH: 0

## 2021-12-19 NOTE — PROGRESS NOTES
"Subjective:   Bella Garibay is a 51 y.o. female who presents for UTI (pain LRQ of back. pain comes and goes. x12 weeks )      Is a pleasant 51-year-old female presenting to urgent care complaining of waxing and waning right-sided lumbar/flank pain.  Initially the pain was present last week and then today it seems like she is had more pain.  She has not noticed any fevers or chills.  She denies any dysuria, frequency or urgency.  She does have a history of back pain with radiculopathy, currently she is not experiencing any radiation of pain.  She has no acute injury or trauma that she can recall, in fact she spent a lot of yesterday resting in her recliner.  She has not noticed any hematuria.      Review of Systems   Constitutional: Negative for chills and fever.   HENT: Negative for congestion, ear pain and sore throat.    Eyes: Negative for pain.   Respiratory: Negative for cough and shortness of breath.    Cardiovascular: Negative for chest pain.   Gastrointestinal: Negative for abdominal pain, constipation, diarrhea, nausea and vomiting.   Genitourinary: Positive for flank pain. Negative for dysuria, frequency, hematuria and urgency.   Musculoskeletal: Positive for back pain. Negative for myalgias.   Skin: Negative for rash.   Neurological: Negative for headaches.       Medications, Allergies, and current problem list reviewed today in Epic.     Objective:     /68   Pulse 94   Temp 36.4 °C (97.6 °F) (Temporal)   Resp 16   Ht 1.651 m (5' 5\")   Wt (!) 134 kg (295 lb)   SpO2 91%     Physical Exam  Vitals reviewed.   Constitutional:       Appearance: Normal appearance.   HENT:      Head: Normocephalic and atraumatic.      Right Ear: External ear normal.      Left Ear: External ear normal.      Nose: Nose normal.      Mouth/Throat:      Mouth: Mucous membranes are moist.   Eyes:      Conjunctiva/sclera: Conjunctivae normal.   Cardiovascular:      Rate and Rhythm: Normal rate and regular rhythm. "   Pulmonary:      Effort: Pulmonary effort is normal.      Breath sounds: Normal breath sounds.   Abdominal:      Tenderness: There is no abdominal tenderness. There is no right CVA tenderness, left CVA tenderness, guarding or rebound.   Musculoskeletal:      Comments: Mild tenderness to palpation of the right sacroiliac and right lateral lumbar muscles.  No appreciated midline step-off crepitus or deformity.  Palpation of this region reproduces the patient's pain.  5-5 strength of the lower extremities, distally neurovascularly intact.  Ambulatory with a steady station and gait.   Skin:     General: Skin is warm and dry.      Capillary Refill: Capillary refill takes less than 2 seconds.   Neurological:      Mental Status: She is alert and oriented to person, place, and time.           Lab Results/POC Test Results   Results for orders placed or performed in visit on 12/18/21   POCT Urinalysis   Result Value Ref Range    POC Color yellow Negative    POC Appearance slightly cloudy Negative    POC Leukocyte Esterase negative Negative    POC Nitrites negative Negative    POC Urobiligen 0.2 Negative (0.2) mg/dL    POC Protein negative Negative mg/dL    POC Urine PH 7.0 5.0 - 8.0    POC Blood negative Negative    POC Specific Gravity 1.020 <1.005 - >1.030    POC Ketones negative Negative mg/dL    POC Bilirubin negative Negative mg/dL    POC Glucose 500 Negative mg/dL           Assessment/Plan:     Diagnosis and associated orders:     1. Strain of lumbar region, initial encounter  POCT Urinalysis    URINE CULTURE(NEW)      Comments/MDM:     Patient presents to urgent care as she is concerned about a possible kidney stone, urinary tract infection and that her symptoms represented pain of her kidneys or similar etiology.  However her urinalysis is reassuring with glycosuria from her SGLT2 without any evidence of blood.  She already has a prescription for Flexeril I recommended that she use this medication judiciously, try  warm or cold therapy.  Try gentle stretching.  Avoid any activities that exacerbate the pain as well as try Tylenol once or twice daily and continue to monitor symptoms.  To further rule out infection I will perform a urine culture and follow-up with the patient if any change of therapy is indicated.  We discussed return precautions.  No evidence of cord compression syndrome or more serious etiology       Differential diagnosis, natural history, supportive care, and indications for immediate follow-up discussed.    Advised the patient to follow-up with the primary care physician for recheck, reevaluation, and consideration of further management.    Please note that this dictation was created using voice recognition software. I have made a reasonable attempt to correct obvious errors, but I expect that there are errors of grammar and possibly content that I did not discover before finalizing the note.    This note was electronically signed by Blanco Mcelroy PA-C

## 2021-12-21 LAB
BACTERIA UR CULT: NORMAL
SIGNIFICANT IND 70042: NORMAL
SITE SITE: NORMAL
SOURCE SOURCE: NORMAL

## 2022-01-06 RX ORDER — BUPROPION HYDROCHLORIDE 150 MG/1
150 TABLET ORAL EVERY MORNING
Qty: 90 TABLET | Refills: 1 | Status: SHIPPED | OUTPATIENT
Start: 2022-01-06 | End: 2022-07-20

## 2022-01-06 NOTE — TELEPHONE ENCOUNTER
Received request via: Pharmacy    Was the patient seen in the last year in this department? Yes 7/12/2021    Does the patient have an active prescription (recently filled or refills available) for medication(s) requested? No

## 2022-01-11 DIAGNOSIS — M77.11 EPICONDYLITIS, LATERAL, RIGHT: ICD-10-CM

## 2022-01-11 DIAGNOSIS — E66.01 MORBID OBESITY WITH BMI OF 40.0-44.9, ADULT (HCC): ICD-10-CM

## 2022-01-11 DIAGNOSIS — G44.221 CHRONIC TENSION-TYPE HEADACHE, INTRACTABLE: ICD-10-CM

## 2022-01-11 NOTE — TELEPHONE ENCOUNTER
Received request via: Patient    Was the patient seen in the last year in this department? Yes  7/12/21  Does the patient have an active prescription (recently filled or refills available) for medication(s) requested? No

## 2022-01-11 NOTE — TELEPHONE ENCOUNTER
----- Message from Bella Garibay sent at 1/10/2022  7:02 PM PST -----  Regarding: Phentermine refill  Jackson BeckReny really looking forward to this new year and getting control of my weight! Can you please send a prescription for 90 days worth of phentermine please? Thank you very much for your time

## 2022-01-11 NOTE — TELEPHONE ENCOUNTER
----- Message from Bella Garibay sent at 1/10/2022  9:45 PM PST -----  Regarding: New PHARMICY   Can all of my medication be [facility] to [facility] [address]  with a 90 day prescription so we can afford it please?

## 2022-01-12 RX ORDER — IBUPROFEN 800 MG/1
800 TABLET ORAL EVERY 8 HOURS PRN
Qty: 90 TABLET | Refills: 3 | Status: SHIPPED | OUTPATIENT
Start: 2022-01-12 | End: 2024-02-13 | Stop reason: SDUPTHER

## 2022-01-12 RX ORDER — LEVOTHYROXINE SODIUM 175 UG/1
175 TABLET ORAL
Qty: 90 TABLET | Refills: 3 | Status: SHIPPED | OUTPATIENT
Start: 2022-01-12 | End: 2022-08-23 | Stop reason: SDUPTHER

## 2022-01-12 RX ORDER — OMEPRAZOLE 20 MG/1
20 CAPSULE, DELAYED RELEASE ORAL DAILY
Qty: 90 CAPSULE | Refills: 3 | Status: SHIPPED | OUTPATIENT
Start: 2022-01-12 | End: 2024-02-13

## 2022-01-12 RX ORDER — PHENTERMINE HYDROCHLORIDE 15 MG/1
15 CAPSULE ORAL EVERY MORNING
Qty: 90 CAPSULE | Refills: 0 | Status: SHIPPED | OUTPATIENT
Start: 2022-01-12 | End: 2022-05-18 | Stop reason: SDUPTHER

## 2022-01-12 RX ORDER — AMITRIPTYLINE HYDROCHLORIDE 150 MG/1
TABLET ORAL
Qty: 90 TABLET | Refills: 3 | Status: SHIPPED | OUTPATIENT
Start: 2022-01-12 | End: 2022-01-24 | Stop reason: SDUPTHER

## 2022-01-12 RX ORDER — CYCLOBENZAPRINE HCL 10 MG
10 TABLET ORAL 2 TIMES DAILY PRN
Qty: 180 TABLET | Refills: 1 | Status: SHIPPED | OUTPATIENT
Start: 2022-01-12 | End: 2024-02-13

## 2022-01-12 RX ORDER — PHENTERMINE HYDROCHLORIDE 15 MG/1
CAPSULE ORAL
Refills: 0 | OUTPATIENT
Start: 2022-01-12

## 2022-01-13 ENCOUNTER — HOSPITAL ENCOUNTER (OUTPATIENT)
Facility: MEDICAL CENTER | Age: 52
End: 2022-01-13
Attending: NURSE PRACTITIONER
Payer: OTHER GOVERNMENT

## 2022-01-13 ENCOUNTER — OFFICE VISIT (OUTPATIENT)
Dept: MEDICAL GROUP | Facility: LAB | Age: 52
End: 2022-01-13
Payer: OTHER GOVERNMENT

## 2022-01-13 VITALS
BODY MASS INDEX: 48.48 KG/M2 | SYSTOLIC BLOOD PRESSURE: 120 MMHG | RESPIRATION RATE: 16 BRPM | OXYGEN SATURATION: 94 % | WEIGHT: 291 LBS | TEMPERATURE: 97 F | DIASTOLIC BLOOD PRESSURE: 84 MMHG | HEIGHT: 65 IN | HEART RATE: 84 BPM

## 2022-01-13 DIAGNOSIS — J06.9 ACUTE URI: ICD-10-CM

## 2022-01-13 DIAGNOSIS — R51.9 GENERALIZED HEADACHE: ICD-10-CM

## 2022-01-13 PROCEDURE — 99213 OFFICE O/P EST LOW 20 MIN: CPT | Performed by: NURSE PRACTITIONER

## 2022-01-13 PROCEDURE — U0003 INFECTIOUS AGENT DETECTION BY NUCLEIC ACID (DNA OR RNA); SEVERE ACUTE RESPIRATORY SYNDROME CORONAVIRUS 2 (SARS-COV-2) (CORONAVIRUS DISEASE [COVID-19]), AMPLIFIED PROBE TECHNIQUE, MAKING USE OF HIGH THROUGHPUT TECHNOLOGIES AS DESCRIBED BY CMS-2020-01-R: HCPCS

## 2022-01-13 PROCEDURE — U0005 INFEC AGEN DETEC AMPLI PROBE: HCPCS

## 2022-01-13 RX ORDER — PROMETHAZINE HYDROCHLORIDE AND CODEINE PHOSPHATE 6.25; 1 MG/5ML; MG/5ML
5-10 SYRUP ORAL 4 TIMES DAILY PRN
Qty: 240 ML | Refills: 0 | Status: SHIPPED | OUTPATIENT
Start: 2022-01-13 | End: 2022-01-20

## 2022-01-13 RX ORDER — PROMETHAZINE HYDROCHLORIDE AND CODEINE PHOSPHATE 6.25; 1 MG/5ML; MG/5ML
5-10 SYRUP ORAL 4 TIMES DAILY PRN
Qty: 240 ML | Refills: 0 | Status: SHIPPED | OUTPATIENT
Start: 2022-01-13 | End: 2022-01-13 | Stop reason: SDUPTHER

## 2022-01-13 RX ORDER — BUTALBITAL, ASPIRIN, AND CAFFEINE 325; 50; 40 MG/1; MG/1; MG/1
1 CAPSULE ORAL
Qty: 90 CAPSULE | Refills: 0 | Status: SHIPPED | OUTPATIENT
Start: 2022-01-13 | End: 2022-02-17

## 2022-01-13 ASSESSMENT — PATIENT HEALTH QUESTIONNAIRE - PHQ9: CLINICAL INTERPRETATION OF PHQ2 SCORE: 0

## 2022-01-13 NOTE — TELEPHONE ENCOUNTER
----- Message from Ion Garibay on behalf of Bella Garibay sent at 1/13/2022  2:31 PM PST -----  Regarding: Promethazine codeine  This message is being sent by Ion Garibay on behalf of Bella Garibay.    [facility] to [facility] said it need to be only electronically sent

## 2022-01-13 NOTE — PROGRESS NOTES
"Chief Complaint   Patient presents with   • Head Pain     X 2 days   • URI       HPI   Tammie is a 51-year-old established female here with complaint of new onset URI symptoms about 48 hours ago. Symptoms: Congestion / cough / HA / SOB x 48 hours.  Symptoms came on quickly Tuesday morning. Symptoms persistent.  Taking mucinex, robitussin, tylenol, probiotics.  Sleeping a lot.  Appetite down.  Slight ST.    Triple vaccinated.    covid contact 4 d ago.      Past medical, surgical, family, and social history is reviewed and updated in Epic chart by me today.   Medications and allergies reviewed and updated in Epic chart by me today.     ROS:   Denies n/v/d or abdominal pain.     Exam:  /84 (BP Location: Right arm, Patient Position: Sitting, BP Cuff Size: Large adult)   Pulse 84   Temp 36.1 °C (97 °F)   Resp 16   Ht 1.651 m (5' 5\")   Wt (!) 132 kg (291 lb)   SpO2 94%     Constitutional: Alert, no distress, plus 3 vital signs  Skin:  Warm, dry, no rashes invisible areas  Eye: Equal, round and reactive, conjunctiva clear  ENMT: I did not have the patient remove her mask because of COVID contact and current symptoms. She denied sore throat. She denied ear pain.  Neck: Mild anterior cervical, nontender lymphadenopathy  Respiratory: Unlabored respiration, lungs clear to auscultation, no wheezes, no rhonchi  Cardiovascular: Normal rate and rhythm  Psych: Alert, pleasant, well-groomed, normal affect      A/P:  \"  1. Acute URI  promethazine-codeine (PHENERGAN-CODEINE) 6.25-10 MG/5ML Syrup    2019 SARS-CoV-2 by PCR ([facility])   \"  Likely COVID infection. She has responded well to promethazine with codeine cough syrup which was refilled for her as over-the-counter cough suppressants do not seem to be helping her. Reviewed her  profile which does not show controlled substances filled by another provider. She understands that she should not mix codeine with alcohol. Discussed symptomatic treatment with " over-the-counter cold medication, Tylenol, ibuprofen, high fluid intake, deep breathing exercises and rest. Discussed the importance of quarantine for a full 5 days and if symptoms persist, quarantining for a full 10 days. Discussed ER precautions.  COVID tested today.    Incidentally she also requested a refill of Fioricet while here and this was done for her.    In prescribing controlled substances to this patient, I certify that I have obtained and reviewed the medical history of Bella Garibay. I have also made a good luis armando effort to obtain applicable records from other providers who have treated the patient and records did not demonstrate any increased risk of substance abuse that would prevent me from prescribing controlled substances.     I have conducted a physical exam and documented it. I have reviewed Ms. Garibay’s prescription history as maintained by the [place] Prescription Monitoring Program.     I have assessed the patient’s risk for abuse, dependency, and addiction using the validated Opioid Risk Tool available at https://www.mdcalc.com/wvzgjy-iauc-fpuj-ort-narcotic-abuse.     Given the above, I believe the benefits of controlled substance therapy outweigh the risks. The reasons for prescribing controlled substances include non-narcotic, oral analgesic alternatives have been inadequate for pain control. Accordingly, I have discussed the risk and benefits, treatment plan, and alternative therapies with the patient.

## 2022-01-14 DIAGNOSIS — J06.9 ACUTE URI: ICD-10-CM

## 2022-01-24 RX ORDER — AMITRIPTYLINE HYDROCHLORIDE 150 MG/1
TABLET ORAL
Qty: 90 TABLET | Refills: 3 | Status: SHIPPED | OUTPATIENT
Start: 2022-01-24 | End: 2022-09-02 | Stop reason: SDUPTHER

## 2022-01-24 NOTE — TELEPHONE ENCOUNTER
Received request via: Pharmacy    Was the patient seen in the last year in this department? Yes  LOV : 1/13/2022    Does the patient have an active prescription (recently filled or refills available) for medication(s) requested? Yes

## 2022-01-25 DIAGNOSIS — F41.9 ANXIETY: ICD-10-CM

## 2022-01-25 NOTE — TELEPHONE ENCOUNTER
Received request via: Patient    Was the patient seen in the last year in this department? Yes  1/13/22  Does the patient have an active prescription (recently filled or refills available) for medication(s) requested? No

## 2022-01-25 NOTE — TELEPHONE ENCOUNTER
----- Message from Bella Garibay sent at 1/25/2022 10:15 AM PST -----  Regarding: Refill  Hi Reny can you please refill my alprazolam thank you very much for taking care of us while we don’t have insurance.

## 2022-01-26 RX ORDER — ALPRAZOLAM 1 MG/1
TABLET ORAL
Qty: 90 TABLET | Refills: 0 | Status: SHIPPED | OUTPATIENT
Start: 2022-01-26 | End: 2022-01-28

## 2022-01-28 DIAGNOSIS — F41.9 ANXIETY: ICD-10-CM

## 2022-01-28 RX ORDER — ALPRAZOLAM 1 MG/1
TABLET ORAL
Qty: 90 TABLET | Refills: 0 | Status: SHIPPED | OUTPATIENT
Start: 2022-01-28 | End: 2022-02-27

## 2022-01-28 NOTE — TELEPHONE ENCOUNTER
Received request via: Pharmacy    Was the patient seen in the last year in this department? Yes  1/13/22  Does the patient have an active prescription (recently filled or refills available) for medication(s) requested? No

## 2022-02-14 ENCOUNTER — TELEPHONE (OUTPATIENT)
Dept: MEDICAL GROUP | Facility: LAB | Age: 52
End: 2022-02-14

## 2022-02-14 NOTE — TELEPHONE ENCOUNTER
----- Message from Bella Garibay sent at 2/12/2022  1:48 PM PST -----  Regarding: Kendal Oglesby can you please refill my prescription for Fiorinal. 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 I am purchasing protective eye wear and the vented mouth covering. Besides b[employer]ch [employer] Lomb eye rinse can you think of anything that might help? I will make an appointment with my eye doctor as soon as we get insurance on April 1. Thank you again for our continued medical relationship. I hope all is well with you.

## 2022-03-01 PROBLEM — R51.9 GENERALIZED HEADACHES: Status: ACTIVE | Noted: 2022-03-01

## 2022-03-01 RX ORDER — BUTALBITAL, ASPIRIN, AND CAFFEINE 325; 50; 40 MG/1; MG/1; MG/1
CAPSULE ORAL
Qty: 90 CAPSULE | Refills: 0 | OUTPATIENT
Start: 2022-03-01 | End: 2022-03-31

## 2022-03-16 DIAGNOSIS — F41.9 ANXIETY: ICD-10-CM

## 2022-03-17 RX ORDER — ALPRAZOLAM 1 MG/1
TABLET ORAL
Qty: 90 TABLET | Refills: 1 | Status: SHIPPED | OUTPATIENT
Start: 2022-03-17 | End: 2022-04-23 | Stop reason: SDUPTHER

## 2022-03-17 NOTE — TELEPHONE ENCOUNTER
Received request via: Pharmacy    Was the patient seen in the last year in this department? Yes  1/13/2022  Does the patient have an active prescription (recently filled or refills available) for medication(s) requested? No

## 2022-03-28 ENCOUNTER — PATIENT MESSAGE (OUTPATIENT)
Dept: MEDICAL GROUP | Facility: LAB | Age: 52
End: 2022-03-28

## 2022-03-28 DIAGNOSIS — E11.9 CONTROLLED TYPE 2 DIABETES MELLITUS WITHOUT COMPLICATION, WITHOUT LONG-TERM CURRENT USE OF INSULIN (HCC): ICD-10-CM

## 2022-03-28 RX ORDER — EMPAGLIFLOZIN 25 MG/1
1 TABLET, FILM COATED ORAL DAILY
Qty: 90 TABLET | Refills: 3 | Status: SHIPPED | OUTPATIENT
Start: 2022-03-28

## 2022-03-28 NOTE — TELEPHONE ENCOUNTER
Can you please send this prescription to [facility] pharmacy on [address]? When approved

## 2022-03-28 NOTE — PATIENT COMMUNICATION
Empagliflozin (JARDIANCE) 25 MG Tab 90 Tablet 3/3 3/28/2022     Sig - Route: Take 1 Tablet by mouth every day. - Oral    Sent to pharmacy as: Jardiance 25 MG Oral Tablet (Empagliflozin)    Notes to Pharmacy: Dr. Chowdary    E-Prescribing Status: Receipt confirmed by pharmacy (3/28/2022  9:26 AM PDT)        Pharmacy    [id number] - [place], [place] - [address]

## 2022-03-28 NOTE — TELEPHONE ENCOUNTER
----- Message from Bella Garibay sent at 3/28/2022  9:12 AM PDT -----  Regarding: Kathy Oglesby can you please send my prescription of Jardiance to [facility]  pharmacy on [address].  Thank you

## 2022-03-28 NOTE — TELEPHONE ENCOUNTER
----- Message from Bella Garibay sent at 3/27/2022 11:39 AM PDT -----  Regarding: Jardiance  Jardiance arnold Oglesby I need a refill on my Jardiance please I just took my last pill today and I was unaware that I did not have any refills [facility] [address] [facility] I can get my Jardiance from thank you very much

## 2022-04-05 ENCOUNTER — TELEPHONE (OUTPATIENT)
Dept: MEDICAL GROUP | Facility: LAB | Age: 52
End: 2022-04-05

## 2022-04-05 NOTE — TELEPHONE ENCOUNTER
Insurance requires prior auth for jardiance. I do not see that she has taken any of the medications below. Do you want me to start prior auth?

## 2022-04-08 ENCOUNTER — TELEPHONE (OUTPATIENT)
Dept: MEDICAL GROUP | Facility: LAB | Age: 52
End: 2022-04-08

## 2022-04-08 NOTE — TELEPHONE ENCOUNTER
DOCUMENTATION OF PAR STATUS:    1. Name of Medication & Dose: JARDIANCE     2. Name of Prescription Coverage Company & phone #: [employer]    3. Date Prior Auth Submitted: 4/8/2022    4. What information was given to obtain insurance decision? FAXED OV NOTES    5. Prior Auth Status? Pending    6. Patient Notified: no

## 2022-04-08 NOTE — TELEPHONE ENCOUNTER
----- Message from Mely August sent at 1/9/2017  8:01 AM CST -----  Contact: Patient  Steff, patient [phone], Patient is calling about [employer] Pen, requesting refill. Please advise. Thanks   FINAL PRIOR AUTHORIZATION STATUS:    1.  Name of Medication & Dose: jardiance 25mg     2. Prior Auth Status: Approved through [employer]     3. Action Taken: Pharmacy Notified: N\A Patient Notified: N\A

## 2022-04-23 DIAGNOSIS — F41.9 ANXIETY: ICD-10-CM

## 2022-04-26 RX ORDER — ALPRAZOLAM 1 MG/1
1 TABLET ORAL 3 TIMES DAILY PRN
Qty: 90 TABLET | Refills: 1 | Status: SHIPPED | OUTPATIENT
Start: 2022-04-26 | End: 2022-05-26

## 2022-07-05 DIAGNOSIS — M54.41 CHRONIC BILATERAL LOW BACK PAIN WITH RIGHT-SIDED SCIATICA: ICD-10-CM

## 2022-07-05 DIAGNOSIS — F41.9 ANXIETY: ICD-10-CM

## 2022-07-05 DIAGNOSIS — G89.29 CHRONIC BILATERAL LOW BACK PAIN WITH RIGHT-SIDED SCIATICA: ICD-10-CM

## 2022-07-05 RX ORDER — ALPRAZOLAM 1 MG/1
TABLET ORAL
Qty: 90 TABLET | Refills: 0 | Status: SHIPPED | OUTPATIENT
Start: 2022-07-05 | End: 2022-08-08 | Stop reason: SDUPTHER

## 2022-07-27 ENCOUNTER — APPOINTMENT (OUTPATIENT)
Dept: BEHAVIORAL HEALTH | Facility: CLINIC | Age: 52
End: 2022-07-27
Payer: COMMERCIAL

## 2022-08-04 NOTE — TELEPHONE ENCOUNTER
Patient prepped for procedure using . Mother at bedside.    Was the patient seen in the last year in this department? Yes     Does patient have an active prescription for medications requested? No     Received Request Via: Patient

## 2022-08-08 ENCOUNTER — PATIENT MESSAGE (OUTPATIENT)
Dept: MEDICAL GROUP | Facility: LAB | Age: 52
End: 2022-08-08
Payer: COMMERCIAL

## 2022-08-08 DIAGNOSIS — Z00.00 PREVENTATIVE HEALTH CARE: ICD-10-CM

## 2022-08-08 DIAGNOSIS — F41.9 ANXIETY: ICD-10-CM

## 2022-08-08 DIAGNOSIS — R68.89 TEMPERATURE INTOLERANCE: ICD-10-CM

## 2022-08-08 NOTE — TELEPHONE ENCOUNTER
Received request via: Pharmacy    Was the patient seen in the last year in this department? Yes, LOV: 01/13/2022    Does the patient have an active prescription (recently filled or refills available) for medication(s) requested? No

## 2022-08-09 RX ORDER — ALPRAZOLAM 1 MG/1
1 TABLET ORAL 3 TIMES DAILY PRN
Qty: 90 TABLET | Refills: 0 | Status: SHIPPED | OUTPATIENT
Start: 2022-08-09 | End: 2022-09-13 | Stop reason: SDUPTHER

## 2022-08-23 RX ORDER — BUPROPION HYDROCHLORIDE 150 MG/1
150 TABLET ORAL EVERY MORNING
Qty: 90 TABLET | Refills: 2 | Status: SHIPPED | OUTPATIENT
Start: 2022-08-23 | End: 2023-03-21 | Stop reason: SDUPTHER

## 2022-08-23 RX ORDER — LEVOTHYROXINE SODIUM 175 UG/1
175 TABLET ORAL
Qty: 90 TABLET | Refills: 3 | Status: SHIPPED | OUTPATIENT
Start: 2022-08-23 | End: 2023-06-12 | Stop reason: SDUPTHER

## 2022-08-23 NOTE — TELEPHONE ENCOUNTER
Received request via: Pharmacy  MAIL ORDER  Was the patient seen in the last year in this department? Yes  1/13/22  Does the patient have an active prescription (recently filled or refills available) for medication(s) requested? No

## 2022-08-25 DIAGNOSIS — F51.01 PRIMARY INSOMNIA: ICD-10-CM

## 2022-08-25 DIAGNOSIS — F41.9 ANXIETY: ICD-10-CM

## 2022-08-26 RX ORDER — PHENTERMINE HYDROCHLORIDE 15 MG/1
15 CAPSULE ORAL EVERY MORNING
Qty: 30 CAPSULE | Refills: 0 | Status: SHIPPED | OUTPATIENT
Start: 2022-08-26 | End: 2022-10-05

## 2022-08-26 RX ORDER — CYANOCOBALAMIN 1000 UG/ML
1000 INJECTION, SOLUTION INTRAMUSCULAR; SUBCUTANEOUS
Qty: 10 ML | Refills: 1 | Status: SHIPPED | OUTPATIENT
Start: 2022-08-26 | End: 2022-12-19

## 2022-08-26 RX ORDER — AMITRIPTYLINE HYDROCHLORIDE 50 MG/1
TABLET, FILM COATED ORAL
Qty: 90 TABLET | Refills: 3 | Status: SHIPPED | OUTPATIENT
Start: 2022-08-26 | End: 2023-07-20

## 2022-08-26 RX ORDER — ALPRAZOLAM 1 MG/1
1 TABLET ORAL 3 TIMES DAILY PRN
Qty: 90 TABLET | Refills: 0 | OUTPATIENT
Start: 2022-08-26 | End: 2022-09-25

## 2022-09-02 ENCOUNTER — PATIENT MESSAGE (OUTPATIENT)
Dept: MEDICAL GROUP | Facility: LAB | Age: 52
End: 2022-09-02
Payer: COMMERCIAL

## 2022-09-02 RX ORDER — AMITRIPTYLINE HYDROCHLORIDE 150 MG/1
TABLET ORAL
Qty: 90 TABLET | Refills: 3 | Status: SHIPPED | OUTPATIENT
Start: 2022-09-02 | End: 2023-07-20 | Stop reason: SDUPTHER

## 2022-09-13 DIAGNOSIS — F41.9 ANXIETY: ICD-10-CM

## 2022-09-14 RX ORDER — ALPRAZOLAM 1 MG/1
1 TABLET ORAL 3 TIMES DAILY PRN
Qty: 90 TABLET | Refills: 0 | Status: SHIPPED | OUTPATIENT
Start: 2022-09-14 | End: 2022-10-12

## 2022-09-21 ENCOUNTER — HOSPITAL ENCOUNTER (OUTPATIENT)
Dept: LAB | Facility: MEDICAL CENTER | Age: 52
End: 2022-09-21
Attending: NURSE PRACTITIONER
Payer: COMMERCIAL

## 2022-09-21 DIAGNOSIS — Z00.00 PREVENTATIVE HEALTH CARE: ICD-10-CM

## 2022-09-21 DIAGNOSIS — R68.89 TEMPERATURE INTOLERANCE: ICD-10-CM

## 2022-09-21 LAB
ALBUMIN SERPL BCP-MCNC: 4.3 G/DL (ref 3.2–4.9)
ALBUMIN/GLOB SERPL: 1.5 G/DL
ALP SERPL-CCNC: 64 U/L (ref 30–99)
ALT SERPL-CCNC: 15 U/L (ref 2–50)
ANION GAP SERPL CALC-SCNC: 10 MMOL/L (ref 7–16)
AST SERPL-CCNC: 12 U/L (ref 12–45)
BASOPHILS # BLD AUTO: 0.8 % (ref 0–1.8)
BASOPHILS # BLD: 0.06 K/UL (ref 0–0.12)
BILIRUB SERPL-MCNC: 0.4 MG/DL (ref 0.1–1.5)
BUN SERPL-MCNC: 14 MG/DL (ref 8–22)
CALCIUM SERPL-MCNC: 9.4 MG/DL (ref 8.5–10.5)
CHLORIDE SERPL-SCNC: 102 MMOL/L (ref 96–112)
CHOLEST SERPL-MCNC: 176 MG/DL (ref 100–199)
CO2 SERPL-SCNC: 28 MMOL/L (ref 20–33)
CREAT SERPL-MCNC: 0.7 MG/DL (ref 0.5–1.4)
EOSINOPHIL # BLD AUTO: 0.17 K/UL (ref 0–0.51)
EOSINOPHIL NFR BLD: 2.3 % (ref 0–6.9)
ERYTHROCYTE [DISTWIDTH] IN BLOOD BY AUTOMATED COUNT: 46 FL (ref 35.9–50)
EST. AVERAGE GLUCOSE BLD GHB EST-MCNC: 123 MG/DL
FASTING STATUS PATIENT QL REPORTED: NORMAL
GFR SERPLBLD CREATININE-BSD FMLA CKD-EPI: 103 ML/MIN/1.73 M 2
GLOBULIN SER CALC-MCNC: 2.9 G/DL (ref 1.9–3.5)
GLUCOSE SERPL-MCNC: 94 MG/DL (ref 65–99)
HBA1C MFR BLD: 5.9 % (ref 4–5.6)
HCT VFR BLD AUTO: 54.3 % (ref 37–47)
HDLC SERPL-MCNC: 50 MG/DL
HGB BLD-MCNC: 18 G/DL (ref 12–16)
IMM GRANULOCYTES # BLD AUTO: 0.03 K/UL (ref 0–0.11)
IMM GRANULOCYTES NFR BLD AUTO: 0.4 % (ref 0–0.9)
LDLC SERPL CALC-MCNC: 104 MG/DL
LYMPHOCYTES # BLD AUTO: 1.7 K/UL (ref 1–4.8)
LYMPHOCYTES NFR BLD: 23.3 % (ref 22–41)
MCH RBC QN AUTO: 31.8 PG (ref 27–33)
MCHC RBC AUTO-ENTMCNC: 33.1 G/DL (ref 33.6–35)
MCV RBC AUTO: 95.9 FL (ref 81.4–97.8)
MONOCYTES # BLD AUTO: 0.58 K/UL (ref 0–0.85)
MONOCYTES NFR BLD AUTO: 8 % (ref 0–13.4)
NEUTROPHILS # BLD AUTO: 4.75 K/UL (ref 2–7.15)
NEUTROPHILS NFR BLD: 65.2 % (ref 44–72)
NRBC # BLD AUTO: 0 K/UL
NRBC BLD-RTO: 0 /100 WBC
PLATELET # BLD AUTO: 265 K/UL (ref 164–446)
PMV BLD AUTO: 10.3 FL (ref 9–12.9)
POTASSIUM SERPL-SCNC: 4.7 MMOL/L (ref 3.6–5.5)
PROT SERPL-MCNC: 7.2 G/DL (ref 6–8.2)
RBC # BLD AUTO: 5.66 M/UL (ref 4.2–5.4)
SODIUM SERPL-SCNC: 140 MMOL/L (ref 135–145)
TRIGL SERPL-MCNC: 108 MG/DL (ref 0–149)
TSH SERPL DL<=0.005 MIU/L-ACNC: 0.33 UIU/ML (ref 0.38–5.33)
VIT B12 SERPL-MCNC: 2569 PG/ML (ref 211–911)
WBC # BLD AUTO: 7.3 K/UL (ref 4.8–10.8)

## 2022-09-21 PROCEDURE — 82306 VITAMIN D 25 HYDROXY: CPT

## 2022-09-21 PROCEDURE — 83036 HEMOGLOBIN GLYCOSYLATED A1C: CPT

## 2022-09-21 PROCEDURE — 80053 COMPREHEN METABOLIC PANEL: CPT

## 2022-09-21 PROCEDURE — 84443 ASSAY THYROID STIM HORMONE: CPT

## 2022-09-21 PROCEDURE — 36415 COLL VENOUS BLD VENIPUNCTURE: CPT

## 2022-09-21 PROCEDURE — 84439 ASSAY OF FREE THYROXINE: CPT

## 2022-09-21 PROCEDURE — 85025 COMPLETE CBC W/AUTO DIFF WBC: CPT

## 2022-09-21 PROCEDURE — 82607 VITAMIN B-12: CPT

## 2022-09-21 PROCEDURE — 80061 LIPID PANEL: CPT

## 2022-09-22 DIAGNOSIS — R79.89 ABNORMAL TSH: ICD-10-CM

## 2022-09-22 LAB
25(OH)D3 SERPL-MCNC: 33 NG/ML (ref 30–100)
T4 FREE SERPL-MCNC: 1.35 NG/DL (ref 0.93–1.7)

## 2022-10-12 DIAGNOSIS — F41.9 ANXIETY: ICD-10-CM

## 2022-10-12 RX ORDER — ALPRAZOLAM 1 MG/1
TABLET ORAL
Qty: 90 TABLET | Refills: 0 | Status: SHIPPED | OUTPATIENT
Start: 2022-10-12 | End: 2022-11-18 | Stop reason: SDUPTHER

## 2022-11-18 DIAGNOSIS — F41.9 ANXIETY: ICD-10-CM

## 2022-11-18 RX ORDER — ALPRAZOLAM 1 MG/1
TABLET ORAL
Qty: 90 TABLET | Refills: 0 | Status: CANCELLED | OUTPATIENT
Start: 2022-11-18 | End: 2022-12-18

## 2022-11-18 NOTE — TELEPHONE ENCOUNTER
Received request via: Patient  1/13/22  Was the patient seen in the last year in this department? Yes    Does the patient have an active prescription (recently filled or refills available) for medication(s) requested? No    Does the patient have [facility] and need 100 day supply (blood pressure, diabetes and cholesterol meds only)? Patient does not have SCP

## 2022-11-20 RX ORDER — ALPRAZOLAM 1 MG/1
TABLET ORAL
Qty: 90 TABLET | Refills: 0 | Status: SHIPPED | OUTPATIENT
Start: 2022-11-20 | End: 2022-12-21 | Stop reason: SDUPTHER

## 2022-11-22 ENCOUNTER — HOSPITAL ENCOUNTER (OUTPATIENT)
Dept: LAB | Facility: MEDICAL CENTER | Age: 52
End: 2022-11-22
Attending: INTERNAL MEDICINE
Payer: COMMERCIAL

## 2022-11-22 LAB
ALBUMIN SERPL BCP-MCNC: 4.4 G/DL (ref 3.2–4.9)
ALBUMIN/GLOB SERPL: 1.5 G/DL
ALP SERPL-CCNC: 69 U/L (ref 30–99)
ALT SERPL-CCNC: 14 U/L (ref 2–50)
ANION GAP SERPL CALC-SCNC: 9 MMOL/L (ref 7–16)
AST SERPL-CCNC: 10 U/L (ref 12–45)
BILIRUB SERPL-MCNC: 0.6 MG/DL (ref 0.1–1.5)
BUN SERPL-MCNC: 12 MG/DL (ref 8–22)
CALCIUM SERPL-MCNC: 9.7 MG/DL (ref 8.5–10.5)
CHLORIDE SERPL-SCNC: 101 MMOL/L (ref 96–112)
CO2 SERPL-SCNC: 28 MMOL/L (ref 20–33)
CREAT SERPL-MCNC: 0.63 MG/DL (ref 0.5–1.4)
CREAT UR-MCNC: 56.9 MG/DL
GFR SERPLBLD CREATININE-BSD FMLA CKD-EPI: 106 ML/MIN/1.73 M 2
GLOBULIN SER CALC-MCNC: 2.9 G/DL (ref 1.9–3.5)
GLUCOSE SERPL-MCNC: 103 MG/DL (ref 65–99)
POTASSIUM SERPL-SCNC: 4.5 MMOL/L (ref 3.6–5.5)
POTASSIUM UR-SCNC: 68 MMOL/L
PROT SERPL-MCNC: 7.3 G/DL (ref 6–8.2)
SODIUM SERPL-SCNC: 138 MMOL/L (ref 135–145)
SODIUM UR-SCNC: 113 MMOL/L
T3FREE SERPL-MCNC: 3.28 PG/ML (ref 2–4.4)
T4 FREE SERPL-MCNC: 1.33 NG/DL (ref 0.93–1.7)
TSH SERPL DL<=0.005 MIU/L-ACNC: 0.86 UIU/ML (ref 0.38–5.33)

## 2022-11-22 PROCEDURE — 82570 ASSAY OF URINE CREATININE: CPT

## 2022-11-22 PROCEDURE — 84300 ASSAY OF URINE SODIUM: CPT

## 2022-11-22 PROCEDURE — 84105 ASSAY OF URINE PHOSPHORUS: CPT

## 2022-11-22 PROCEDURE — 82340 ASSAY OF CALCIUM IN URINE: CPT

## 2022-11-22 PROCEDURE — 82088 ASSAY OF ALDOSTERONE: CPT

## 2022-11-22 PROCEDURE — 84443 ASSAY THYROID STIM HORMONE: CPT

## 2022-11-22 PROCEDURE — 80053 COMPREHEN METABOLIC PANEL: CPT

## 2022-11-22 PROCEDURE — 84439 ASSAY OF FREE THYROXINE: CPT

## 2022-11-22 PROCEDURE — 84305 ASSAY OF SOMATOMEDIN: CPT

## 2022-11-22 PROCEDURE — 36415 COLL VENOUS BLD VENIPUNCTURE: CPT

## 2022-11-22 PROCEDURE — 83945 ASSAY OF OXALATE: CPT

## 2022-11-22 PROCEDURE — 83036 HEMOGLOBIN GLYCOSYLATED A1C: CPT

## 2022-11-22 PROCEDURE — 84133 ASSAY OF URINE POTASSIUM: CPT

## 2022-11-22 PROCEDURE — 84481 FREE ASSAY (FT-3): CPT

## 2022-11-23 LAB
EST. AVERAGE GLUCOSE BLD GHB EST-MCNC: 123 MG/DL
HBA1C MFR BLD: 5.9 % (ref 4–5.6)

## 2022-11-24 LAB
ALDOST SERPL-MCNC: 18.3 NG/DL
IGF-I SERPL-MCNC: 276 NG/ML (ref 52–233)
IGF-I Z-SCORE SERPL: 2.2

## 2022-11-25 LAB
COLLECT DURATION TIME SPEC: NORMAL H
OXALATE 24H UR-MCNC: 10 MG/L
OXALATE 24H UR-MRATE: NORMAL MG/D (ref 13–40)
TOTAL VOLUME 1105: NORMAL ML

## 2022-11-27 LAB
CALCIUM 24H UR-MCNC: 8.5 MG/DL
CALCIUM 24H UR-MRATE: NORMAL MG/D (ref 100–250)
CALCIUM/CREAT 24H UR: 144 MG/G (ref 20–300)
COLLECT DURATION TIME SPEC: NORMAL HRS
COLLECT DURATION TIME SPEC: NORMAL HRS
CREAT 24H UR-MCNC: 59 MG/DL
CREAT 24H UR-MRATE: NORMAL MG/D (ref 500–1400)
PHOSPHATE 24H UR-MCNC: 56 MG/DL
PHOSPHATE 24H UR-MRATE: NORMAL MG/D (ref 400–1300)
PHOSPHATE/CREAT 24H UR: 949 MG/G
SPECIMEN VOL ?TM UR: NORMAL ML
TOTAL VOLUME 1105: NORMAL ML

## 2022-12-16 NOTE — TELEPHONE ENCOUNTER
Received request via: Pharmacy    Was the patient seen in the last year in this department? Yes  1/13/2022  Does the patient have an active prescription (recently filled or refills available) for medication(s) requested? No    Does the patient have [facility] Plus and need 100 day supply (blood pressure, diabetes and cholesterol meds only)? Patient does not have SCP

## 2022-12-19 RX ORDER — PHENTERMINE HYDROCHLORIDE 15 MG/1
CAPSULE ORAL
Qty: 30 CAPSULE | Refills: 0 | Status: SHIPPED | OUTPATIENT
Start: 2022-12-19 | End: 2023-01-04 | Stop reason: SDUPTHER

## 2022-12-19 RX ORDER — CYANOCOBALAMIN 1000 UG/ML
INJECTION, SOLUTION INTRAMUSCULAR; SUBCUTANEOUS
Qty: 10 ML | Refills: 1 | Status: SHIPPED | OUTPATIENT
Start: 2022-12-19 | End: 2023-06-20

## 2022-12-21 DIAGNOSIS — F41.9 ANXIETY: ICD-10-CM

## 2022-12-21 RX ORDER — ALPRAZOLAM 1 MG/1
TABLET ORAL
Qty: 90 TABLET | Refills: 0 | Status: SHIPPED | OUTPATIENT
Start: 2022-12-21 | End: 2023-02-02 | Stop reason: SDUPTHER

## 2022-12-21 NOTE — TELEPHONE ENCOUNTER
Received request via: Pharmacy    Was the patient seen in the last year in this department? Yes  1/13/22  Does the patient have an active prescription (recently filled or refills available) for medication(s) requested? No    Does the patient have [facility]f Plus and need 100 day supply (blood pressure, diabetes and cholesterol meds only)? Medication is not for cholesterol, blood pressure or diabetes

## 2023-01-20 ENCOUNTER — PATIENT MESSAGE (OUTPATIENT)
Dept: MEDICAL GROUP | Facility: LAB | Age: 53
End: 2023-01-20
Payer: COMMERCIAL

## 2023-01-24 RX ORDER — KETOCONAZOLE 20 MG/G
1 CREAM TOPICAL DAILY
Qty: 60 G | Refills: 1 | Status: SHIPPED | OUTPATIENT
Start: 2023-01-24 | End: 2023-04-17

## 2023-01-30 ENCOUNTER — OFFICE VISIT (OUTPATIENT)
Dept: URGENT CARE | Facility: CLINIC | Age: 53
End: 2023-01-30
Payer: COMMERCIAL

## 2023-01-30 VITALS
RESPIRATION RATE: 16 BRPM | WEIGHT: 293 LBS | HEIGHT: 65 IN | HEART RATE: 84 BPM | BODY MASS INDEX: 48.82 KG/M2 | DIASTOLIC BLOOD PRESSURE: 88 MMHG | OXYGEN SATURATION: 95 % | SYSTOLIC BLOOD PRESSURE: 124 MMHG | TEMPERATURE: 97.4 F

## 2023-01-30 DIAGNOSIS — H10.9 BACTERIAL CONJUNCTIVITIS OF RIGHT EYE: ICD-10-CM

## 2023-01-30 DIAGNOSIS — T30.4 CHEMICAL BURN: ICD-10-CM

## 2023-01-30 PROCEDURE — 99213 OFFICE O/P EST LOW 20 MIN: CPT | Performed by: PHYSICIAN ASSISTANT

## 2023-01-30 RX ORDER — POLYMYXIN B SULFATE AND TRIMETHOPRIM 1; 10000 MG/ML; [USP'U]/ML
1 SOLUTION OPHTHALMIC EVERY 4 HOURS
Qty: 10 ML | Refills: 0 | Status: SHIPPED | OUTPATIENT
Start: 2023-01-30 | End: 2023-02-06

## 2023-01-30 RX ORDER — ERYTHROMYCIN 5 MG/G
1 OINTMENT OPHTHALMIC 4 TIMES DAILY
Qty: 1 G | Refills: 0 | Status: SHIPPED | OUTPATIENT
Start: 2023-01-30 | End: 2023-02-06

## 2023-01-30 ASSESSMENT — FIBROSIS 4 INDEX: FIB4 SCORE: 0.53
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
## 2023-02-02 DIAGNOSIS — F41.9 ANXIETY: ICD-10-CM

## 2023-02-02 RX ORDER — ALPRAZOLAM 1 MG/1
TABLET ORAL
Qty: 90 TABLET | Refills: 0 | Status: SHIPPED | OUTPATIENT
Start: 2023-02-02 | End: 2023-03-02 | Stop reason: SDUPTHER

## 2023-02-02 ASSESSMENT — VISUAL ACUITY: OU: 1

## 2023-02-02 NOTE — TELEPHONE ENCOUNTER
Received request via: Pharmacy    Was the patient seen in the last year in this department? Yes  1/13/2022  Does the patient have an active prescription (recently filled or refills available) for medication(s) requested? No    Does the patient have [facility] Plus and need 100 day supply (blood pressure, diabetes and cholesterol meds only)? Patient does not have SCP

## 2023-03-02 ENCOUNTER — PATIENT MESSAGE (OUTPATIENT)
Dept: MEDICAL GROUP | Facility: LAB | Age: 53
End: 2023-03-02
Payer: COMMERCIAL

## 2023-03-07 ENCOUNTER — TELEPHONE (OUTPATIENT)
Dept: MEDICAL GROUP | Facility: LAB | Age: 53
End: 2023-03-07
Payer: COMMERCIAL

## 2023-03-07 NOTE — TELEPHONE ENCOUNTER
I spoke with Bella and asked if she has had an eye exam in the last year and she said she has not. She will ask Reny for eye referral tomorrow when she has visit.

## 2023-03-08 ENCOUNTER — TELEMEDICINE (OUTPATIENT)
Dept: MEDICAL GROUP | Facility: LAB | Age: 53
End: 2023-03-08
Payer: COMMERCIAL

## 2023-03-08 VITALS — WEIGHT: 290 LBS | BODY MASS INDEX: 48.32 KG/M2 | HEIGHT: 65 IN

## 2023-03-08 DIAGNOSIS — F13.20 BENZODIAZEPINE DEPENDENCE (HCC): ICD-10-CM

## 2023-03-08 DIAGNOSIS — E11.9 CONTROLLED TYPE 2 DIABETES MELLITUS WITHOUT COMPLICATION, WITHOUT LONG-TERM CURRENT USE OF INSULIN (HCC): ICD-10-CM

## 2023-03-08 DIAGNOSIS — F41.9 ANXIETY: ICD-10-CM

## 2023-03-08 PROCEDURE — 99213 OFFICE O/P EST LOW 20 MIN: CPT | Mod: 95 | Performed by: NURSE PRACTITIONER

## 2023-03-08 ASSESSMENT — FIBROSIS 4 INDEX: FIB4 SCORE: 0.53

## 2023-03-08 ASSESSMENT — PATIENT HEALTH QUESTIONNAIRE - PHQ9: CLINICAL INTERPRETATION OF PHQ2 SCORE: 0

## 2023-03-08 NOTE — ASSESSMENT & PLAN NOTE
"Chronic issue.  Very upset that she needed a refill Thursday and was told that she would have to be seen as she had not been seen in the office for almost 1 year.  She felt that it was handled unprofessionally and that it is dangerous to \" cut someone off\" from their Xanax.  She does not use her Xanax as needed, she takes it regularly up to 3 times per day.  "

## 2023-03-08 NOTE — PROGRESS NOTES
Virtual Visit: Established Patient   This visit was conducted via Zoom using secure and encrypted videoconferencing technology.   The patient was in their home in the state of [place].    The patient's identity was confirmed and verbal consent was obtained for this virtual visit.    Subjective:   CC:   Chief Complaint   Patient presents with    Medication Refill     Bella is a 52 y.o. est female, last seen in our office 1/2022, prior to that via telemedicine 7/2021 for med refills.      Anxiety:  Chronic issue.  Visit scheduled to f/u on xanax use.  Last refill 3/6/2023 for 1 week supply until she was seen because of controlled substance nature of alprazolam.  Prior to that #90 of a 1 mg alprazolam on February 2. Tells me that she is doing the best that she has in years in terms of moods / anxiety / sleep.  Takes amitriptyline for sleep.  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      Current medicines (including changes today)  Current Outpatient Medications   Medication Sig Dispense Refill    ALPRAZolam (XANAX) 1 MG Tab TAKE 1 TABLET BY MOUTH 3 TIMES A DAY AS NEEDED FOR ANXIETY FOR UP TO 30 DAYS. F41.9 Strength: 1 mg 21 Tablet 0    ketoconazole (NIZORAL) 2 % Cream Apply 1 Application topically every day. 60 g 1    cyanocobalamin (VITAMIN B-12) 1000 MCG/ML Solution INJECT INTRAMUSCULARLY 1 ML AS DIRECTED EVERY 7 DAYS  (DISCARD 28 DAYS AFTER  FIRST USE) 10 mL 1    amitriptyline (ELAVIL) 150 MG Tab TAKE ONE TABLET BY MOUTH AT BEDTIME AS NEEDED FOR SLEEP 90 Tablet 3    amitriptyline (ELAVIL) 50 MG Tab TAKE 1 TO 2 TABLETS BY MOUTH AT BEDTIME AS NEEDED FOR SLEEP 90 Tablet 3    buPROPion (WELLBUTRIN XL) 150 MG XL tablet Take 1 Tablet by mouth every morning. 90 Tablet 2    levothyroxine (SYNTHROID) 175 MCG Tab Take 1 Tablet by mouth every morning on an empty stomach. 90 Tablet 3    Empagliflozin (JARDIANCE) 25 MG Tab Take 1 Tablet by mouth every day. 90 Tablet 3    cyclobenzaprine  "(FLEXERIL) 10 mg Tab Take 1 Tablet by mouth 2 times a day as needed. 180 Tablet 1    ibuprofen (MOTRIN) 800 MG Tab Take 1 Tablet by mouth every 8 hours as needed for Mild Pain. 90 Tablet 3    omeprazole (PRILOSEC) 20 MG delayed-release capsule Take 1 Capsule by mouth every day. In the morning before breakfast 90 Capsule 3    GENOTROPIN 1 MG Recon Soln       Somatropin 0.2 MG Recon Soln Inject 4 mg as instructed every day. Non-functioning pituitary gland S/P  Each      No current facility-administered medications for this visit.       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\" 03/14/2019    Tinea versicolor 08/31/2018    Other constipation 08/31/2018    Controlled type 2 diabetes mellitus without complication ([facility]) 08/24/2017    Chronic bilateral low back pain with right-sided sciatica 08/24/2017    BMI 45.0-49.9, adult ([facility]) 08/15/2017    Right knee pain 06/16/2016    Hypothyroidism due to acquired atrophy of thyroid 11/06/2015    Insomnia 03/20/2014    Anxiety 09/06/2011    Obesity 07/24/2009        Objective:   Ht 1.651 m (5' 5\")   Wt (!) 132 kg (290 lb)   LMP  (LMP Unknown)   BMI 48.26 kg/m²     Physical Exam:  Gen: NAD  Resp: nonlabored.  Able to speak in full sentences  Psy: pleasant / cooperative.   Neuro:  Alert and oriented x 3      Assessment and Plan:   The following treatment plan was discussed:     1. Controlled type 2 diabetes mellitus without complication, without long-term current use of insulin ([facility])  - Referral to Ophthalmology    2. Anxiety  - Referral to Psychiatry    3. Benzodiazepine dependence ([facility])  - Referral to Psychiatry    Patient was very upset from the moment that she logged on with my medical assistant in regards to how her refill was handled last week, towards the end of the week when I was not in the office.  Discussed with her very calmly that she " "must be seen either virtually or in person every 6 months for benzodiazepine therapy (xanax). She was told that it was her personal responsibility to make sure that she is seen every 6 months.  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  She got off of Zoom very quickly, upset at how this is being handled and stated that she wanted to find a new provider.  I did put in an urgent referral to psychiatry in hopes that she will be happier with a different provider, in particular a psychiatrist that specializes in anxiety, insomnia and depression.  Hopefully someone can find a way to taper her off of her Xanax reliance and onto a medication that is not controlled/that she will not need to be seen for every 6 months.   informed.         "

## 2023-03-09 ENCOUNTER — PATIENT MESSAGE (OUTPATIENT)
Dept: MEDICAL GROUP | Facility: LAB | Age: 53
End: 2023-03-09
Payer: COMMERCIAL

## 2023-03-09 DIAGNOSIS — F41.9 ANXIETY: ICD-10-CM

## 2023-03-11 RX ORDER — ALPRAZOLAM 1 MG/1
TABLET ORAL
Qty: 90 TABLET | Refills: 0 | Status: SHIPPED | OUTPATIENT
Start: 2023-03-14 | End: 2023-03-14 | Stop reason: SDUPTHER

## 2023-03-11 NOTE — TELEPHONE ENCOUNTER
Filled rx for mid march through April.  Should keep appt with psy for June to discuss other options besides relying on xanax three times daily.  Will fill until then.  thanks

## 2023-03-14 ENCOUNTER — OFFICE VISIT (OUTPATIENT)
Dept: MEDICAL GROUP | Facility: LAB | Age: 53
End: 2023-03-14
Payer: COMMERCIAL

## 2023-03-14 VITALS
HEIGHT: 65 IN | BODY MASS INDEX: 48.28 KG/M2 | WEIGHT: 289.8 LBS | DIASTOLIC BLOOD PRESSURE: 66 MMHG | RESPIRATION RATE: 14 BRPM | HEART RATE: 82 BPM | OXYGEN SATURATION: 92 % | TEMPERATURE: 97.5 F | SYSTOLIC BLOOD PRESSURE: 128 MMHG

## 2023-03-14 DIAGNOSIS — J06.9 UPPER RESPIRATORY TRACT INFECTION, UNSPECIFIED TYPE: ICD-10-CM

## 2023-03-14 DIAGNOSIS — F41.9 ANXIETY: ICD-10-CM

## 2023-03-14 LAB
EXTERNAL QUALITY CONTROL: NORMAL
INT CON NEG: NEGATIVE
INT CON NEG: NORMAL
INT CON POS: NORMAL
INT CON POS: POSITIVE
S PYO AG THROAT QL: NEGATIVE
SARS-COV+SARS-COV-2 AG RESP QL IA.RAPID: NEGATIVE

## 2023-03-14 PROCEDURE — 99213 OFFICE O/P EST LOW 20 MIN: CPT | Performed by: NURSE PRACTITIONER

## 2023-03-14 PROCEDURE — 87880 STREP A ASSAY W/OPTIC: CPT | Performed by: NURSE PRACTITIONER

## 2023-03-14 PROCEDURE — 87426 SARSCOV CORONAVIRUS AG IA: CPT | Performed by: NURSE PRACTITIONER

## 2023-03-14 RX ORDER — ALPRAZOLAM 1 MG/1
TABLET ORAL
Qty: 90 TABLET | Refills: 0 | Status: SHIPPED | OUTPATIENT
Start: 2023-03-14 | End: 2023-04-04 | Stop reason: SDUPTHER

## 2023-03-14 RX ORDER — PHENTERMINE HYDROCHLORIDE 15 MG/1
CAPSULE ORAL
COMMUNITY
Start: 2023-02-27

## 2023-03-14 RX ORDER — TIRZEPATIDE 2.5 MG/.5ML
INJECTION, SOLUTION SUBCUTANEOUS
COMMUNITY
Start: 2023-03-11 | End: 2024-02-13

## 2023-03-14 ASSESSMENT — FIBROSIS 4 INDEX: FIB4 SCORE: 0.53

## 2023-03-14 NOTE — PROGRESS NOTES
"Subjective:     CC:   Chief Complaint   Patient presents with    Sinus Problem     Congestion        HPI:   Bella presents today with the following:    Onset 3 days ago. Reports congestion, runny nose, sore throat, subjective fever, chills, myalgias, fatigue, pain with swallowing, cough, shortness of breath, wheezing.  Has been taking OTC cold medication with some relief.  + sick contacts.     ROS:   Gen: no fevers/chills, no changes in weight  Eyes: no changes in vision  ENT: no sore throat, no hearing loss, no bloody nose  Pulm: no sob, no cough  CV: no chest pain, no palpitations  GI: no nausea/vomiting, no diarrhea  : no dysuria  MSk: no myalgias  Skin: no rash  Neuro: no headaches, no numbness/tingling  Heme/Lymph: no easy bruising        - NOTE: All other systems reviewed and are negative, except as in HPI.    Objective:     Exam: /66 (BP Location: Right arm, Patient Position: Sitting, BP Cuff Size: Large adult long)   Pulse 82   Temp 36.4 °C (97.5 °F)   Resp 14   Ht 1.651 m (5' 5\")   Wt (!) 131 kg (289 lb 12.8 oz)   SpO2 92%  Body mass index is 48.23 kg/m².    Constitutional: Alert, no distress, plus 3 vital signs  Skin:  Warm, dry, no rashes invisible areas  Eye: Equal, round and reactive, conjunctiva clear  ENMT: Bilateral tympanic membranes are retracted but translucent without erythema or perforation. Oropharynx is slightly injected without exudate. No tonsillar enlargement.    Neck: Mild anterior cervical, nontender lymphadenopathy  Respiratory: Unlabored respiration, lungs clear to auscultation, no wheezes, no rhonchi  Cardiovascular: Normal rate and rhythm  Psych: Alert, pleasant, well-groomed, normal affect    Assessment & Plan:     52 y.o. female with the following -     1. Upper respiratory tract infection, unspecified type  Symptomatic care.  -Oral hydration and rest.   -Cough control: nonpharmacologic options for cough relief such as throat lozenges, hot tea, honey.  -Over the " counter expectorant as directed; Guaifenesin (Mucinex).  -Tylenol or ibuprofen for pain and fever as directed.   -Warm salt water gargles.  -OTC Throat lozenges or spray (Cepacol).  - POCT SARS-COV Antigen LEO (Symptomatic only)  - POCT Rapid Strep A

## 2023-03-21 RX ORDER — BUPROPION HYDROCHLORIDE 150 MG/1
150 TABLET ORAL EVERY MORNING
Qty: 90 TABLET | Refills: 2 | Status: SHIPPED | OUTPATIENT
Start: 2023-03-21 | End: 2023-08-29 | Stop reason: SDUPTHER

## 2023-03-21 NOTE — TELEPHONE ENCOUNTER
Received request via: Pharmacy    Was the patient seen in the last year in this department? Yes  3/14/23  Does the patient have an active prescription (recently filled or refills available) for medication(s) requested? No    Does the patient have nursing home Plus and need 100 day supply (blood pressure, diabetes and cholesterol meds only)? Medication is not for cholesterol, blood pressure or diabetes

## 2023-04-04 DIAGNOSIS — F41.9 ANXIETY: ICD-10-CM

## 2023-04-04 NOTE — TELEPHONE ENCOUNTER
Received request via: Patient    Was the patient seen in the last year in this department? Yes 03/14/23    Does the patient have an active prescription (recently filled or refills available) for medication(s) requested?  Yes    Does the patient have retirement Plus and need 100 day supply (blood pressure, diabetes and cholesterol meds only)? Patient does not have SCP

## 2023-04-05 RX ORDER — ALPRAZOLAM 1 MG/1
TABLET ORAL
Qty: 90 TABLET | Refills: 0 | Status: SHIPPED | OUTPATIENT
Start: 2023-04-13 | End: 2023-04-27

## 2023-04-17 RX ORDER — KETOCONAZOLE 20 MG/G
CREAM TOPICAL
Qty: 60 G | Refills: 1 | Status: SHIPPED | OUTPATIENT
Start: 2023-04-17 | End: 2024-02-13

## 2023-04-17 NOTE — TELEPHONE ENCOUNTER
Received request via: Patient    Was the patient seen in the last year in this department? Yes 3/14/23    Does the patient have an active prescription (recently filled or refills available) for medication(s) requested? No    Does the patient have half-way Plus and need 100 day supply (blood pressure, diabetes and cholesterol meds only)? Patient does not have SCP

## 2023-04-27 DIAGNOSIS — F41.9 ANXIETY: ICD-10-CM

## 2023-04-27 RX ORDER — ALPRAZOLAM 1 MG/1
TABLET ORAL
Qty: 90 TABLET | Refills: 0 | Status: SHIPPED | OUTPATIENT
Start: 2023-05-09 | End: 2023-05-10

## 2023-04-27 NOTE — TELEPHONE ENCOUNTER
Received request via: Pharmacy    Was the patient seen in the last year in this department? Yes 03/14/23    Does the patient have an active prescription (recently filled or refills available) for medication(s) requested? No    Does the patient have halfway Plus and need 100 day supply (blood pressure, diabetes and cholesterol meds only)? Patient does not have SCP

## 2023-05-09 DIAGNOSIS — F41.9 ANXIETY: ICD-10-CM

## 2023-05-09 NOTE — TELEPHONE ENCOUNTER
Received request via: Pharmacy    Was the patient seen in the last year in this department? Yes 03/14/23    Does the patient have an active prescription (recently filled or refills available) for medication(s) requested? No    Does the patient have alf Plus and need 100 day supply (blood pressure, diabetes and cholesterol meds only)? Patient does not have SCP

## 2023-05-10 RX ORDER — ALPRAZOLAM 1 MG/1
TABLET ORAL
Qty: 90 TABLET | Refills: 0 | Status: SHIPPED | OUTPATIENT
Start: 2023-05-10 | End: 2023-06-10

## 2023-06-08 ENCOUNTER — OFFICE VISIT (OUTPATIENT)
Dept: BEHAVIORAL HEALTH | Facility: CLINIC | Age: 53
End: 2023-06-08
Payer: COMMERCIAL

## 2023-06-08 DIAGNOSIS — F51.01 PRIMARY INSOMNIA: ICD-10-CM

## 2023-06-08 DIAGNOSIS — Z79.899 CHRONIC PRESCRIPTION BENZODIAZEPINE USE: ICD-10-CM

## 2023-06-08 DIAGNOSIS — F41.1 GAD (GENERALIZED ANXIETY DISORDER): ICD-10-CM

## 2023-06-08 DIAGNOSIS — F41.9 ANXIETY: ICD-10-CM

## 2023-06-08 PROCEDURE — 99204 OFFICE O/P NEW MOD 45 MIN: CPT | Performed by: PSYCHIATRY & NEUROLOGY

## 2023-06-08 PROCEDURE — 96127 BRIEF EMOTIONAL/BEHAV ASSMT: CPT | Performed by: PSYCHIATRY & NEUROLOGY

## 2023-06-08 RX ORDER — GABAPENTIN 100 MG/1
100 CAPSULE ORAL 3 TIMES DAILY
Qty: 90 CAPSULE | Refills: 1 | Status: SHIPPED | OUTPATIENT
Start: 2023-06-08 | End: 2023-07-20

## 2023-06-08 RX ORDER — ESCITALOPRAM OXALATE 10 MG/1
TABLET ORAL
Qty: 34 TABLET | Refills: 0 | Status: SHIPPED | OUTPATIENT
Start: 2023-06-08 | End: 2023-07-03

## 2023-06-08 RX ORDER — ALPRAZOLAM 1 MG/1
1 TABLET ORAL 3 TIMES DAILY
Qty: 90 TABLET | Refills: 0 | Status: SHIPPED | OUTPATIENT
Start: 2023-06-12 | End: 2023-06-13

## 2023-06-08 ASSESSMENT — ANXIETY QUESTIONNAIRES
3. WORRYING TOO MUCH ABOUT DIFFERENT THINGS: MORE THAN HALF THE DAYS
6. BECOMING EASILY ANNOYED OR IRRITABLE: MORE THAN HALF THE DAYS
1. FEELING NERVOUS, ANXIOUS, OR ON EDGE: MORE THAN HALF THE DAYS
2. NOT BEING ABLE TO STOP OR CONTROL WORRYING: MORE THAN HALF THE DAYS
GAD7 TOTAL SCORE: 13
5. BEING SO RESTLESS THAT IT IS HARD TO SIT STILL: MORE THAN HALF THE DAYS
IF YOU CHECKED OFF ANY PROBLEMS ON THIS QUESTIONNAIRE, HOW DIFFICULT HAVE THESE PROBLEMS MADE IT FOR YOU TO DO YOUR WORK, TAKE CARE OF THINGS AT HOME, OR GET ALONG WITH OTHER PEOPLE: SOMEWHAT DIFFICULT
7. FEELING AFRAID AS IF SOMETHING AWFUL MIGHT HAPPEN: SEVERAL DAYS
4. TROUBLE RELAXING: MORE THAN HALF THE DAYS

## 2023-06-08 ASSESSMENT — PATIENT HEALTH QUESTIONNAIRE - PHQ9
CLINICAL INTERPRETATION OF PHQ2 SCORE: 2
5. POOR APPETITE OR OVEREATING: 2 - MORE THAN HALF THE DAYS
SUM OF ALL RESPONSES TO PHQ QUESTIONS 1-9: 12

## 2023-06-08 NOTE — PROGRESS NOTES
"      INITIAL PSYCHIATRIC EVALUATION      This provider informed the patient their medical records are totally confidential except for the use by other providers involved in their care, or if the patient signs a release, or to report instances of child or elder abuse, or if it is determined they are an immediate risk to harm themselves or others.      CHIEF COMPLAINT  \"Need to stop xanax\"      HISTORY OF PRESENT ILLNESS  Bella Garibay is a 52 y.o. old female comes in today to establish care and for evaluation of anxiety and chronic xanax prescription use.  I did reviewed all outpatient psychiatry follow up notes over last 3 years. Patient is new to the clinic.     Patient is on chronic benzodiazepines and is referred by primary care physician to help with taper of xanax & management of anxiety.  Initial session was dedicated to psychoeducation on the negative impact of chronic benzodiazepine use and how this is not a recommended treatment.  Discussed that I will take over this treatment only if patient is in agreement with the planning of tapering Xanax as discussed below with close monitoring for withdrawal symptoms and managing anxiety.    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  Since then patient has seen changes in her mood and anxiety.  Patient is on Xanax for many years and reports this as the only trial done for anxiety.  Gradually her Xanax dose was increased to the current dosing of 1 mg 3 times daily.  Patient is interested in tapering as she do not like the dependency potential.  Discussed the risk of withdrawal seizures with Wellbutrin present but patient reports anxiety improvement with Wellbutrin for her and her grandmother.    Agreed with tapering Xanax undercover of gabapentin and patient is motivated.  Patient also have anxiety and hot flashes with hypopituitarism and agreed with trial of Lexapro for additional " benefit with anxiety and hot flashes.    Taper plan:  First 2 Weeks: (Xanax 1 mg AM)- (Xanax 0.5 mg afternoon + Gabapentin 100 mg afternoon)- (Xanax 1 mg evening).   Next 2 Weeks: (Xanax 0.5 mg AM + Gabapentin 100 mg AM)- (Xanax 0.5 mg afternoon + Gabapentin 100 mg afternoon)- (Xanax 1 mg evening).   Next 2 Weeks: (Xanax 0.5 mg AM + Gabapentin 100 mg AM)- (Xanax 0.5 mg afternoon + Gabapentin 100 mg afternoon)- (Xanax 0.5 mg evening + Gabapentin 100 mg evening)  Next 2 Weeks: (Xanax 0.25 mg AM + Gabapentin 100-300 mg AM)- (Xanax 0.25 mg afternoon + Gabapentin 100-300 mg afternoon)- (Xanax 0.25 mg evening + Gabapentin 100-300 mg evening)  Next 3 Weeks: (Xanax 0.25 mg AM + Gabapentin 100-300 mg AM)- (Gabapentin 300 mg afternoon)- (Xanax 0.25 mg evening + Gabapentin 100-300 mg evening)  Next 3 Weeks: (Gabapentin 300 mg AM)- (Gabapentin 300 mg afternoon)- (Xanax 0.25 mg evening + Gabapentin 100-300 mg evening)  Next 3 Weeks: (Gabapentin 300 mg AM)- (Gabapentin 300 mg afternoon)- (Gabapentin 300 mg evening)    PSYCHIATRIC REVIEW OF SYSTEMS: denies depressive symptoms, denies manic symptoms, denies psychotic symptoms including AH / VH, denies OCD symptoms, denies restrictive eating or purging, denies trauma related symptoms, and see HPI for anxeity symptoms      MEDICAL REVIEW OF SYSTEMS:   Constitutional  Obesity   Eyes negative   Ears/Nose/Mouth/Throat negative   Cardiovascular negative   Respiratory negative   Gastrointestinal negative   Genitourinary negative   Muscular negative   Integumentary negative   Neurological negative   Endocrine  Hypothyroidism; DM; Hypopituitarism   Hematologic/Lymphatic negative     CURRENT MEDICATIONS:  Current Outpatient Medications   Medication Sig Dispense Refill    ALPRAZolam (XANAX) 1 MG Tab TAKE 1 TABLET BY MOUTH 3 TIMES A DAY AS NEEDED FOR ANXIETY UP TO 30 DAYS 90 Tablet 0    ketoconazole (NIZORAL) 2 % Cream APPLY TO AFFECTED AREA TOPICALLY EVERY DAY 60 g 1    buPROPion (WELLBUTRIN  "XL) 150 MG XL tablet Take 1 Tablet by mouth every morning. 90 Tablet 2    MOUNJARO 2.5 MG/0.5ML Solution Pen-injector INJECT UNDER THE SKIN ONCE A WEEK      phentermine 15 MG capsule TAKE 1 ORAL CAPSULE 2 TIMES A DAY E88.9      cyanocobalamin (VITAMIN B-12) 1000 MCG/ML Solution INJECT INTRAMUSCULARLY 1 ML AS DIRECTED EVERY 7 DAYS  (DISCARD 28 DAYS AFTER  FIRST USE) 10 mL 1    amitriptyline (ELAVIL) 150 MG Tab TAKE ONE TABLET BY MOUTH AT BEDTIME AS NEEDED FOR SLEEP 90 Tablet 3    amitriptyline (ELAVIL) 50 MG Tab TAKE 1 TO 2 TABLETS BY MOUTH AT BEDTIME AS NEEDED FOR SLEEP 90 Tablet 3    levothyroxine (SYNTHROID) 175 MCG Tab Take 1 Tablet by mouth every morning on an empty stomach. 90 Tablet 3    Empagliflozin (JARDIANCE) 25 MG Tab Take 1 Tablet by mouth every day. 90 Tablet 3    cyclobenzaprine (FLEXERIL) 10 mg Tab Take 1 Tablet by mouth 2 times a day as needed. 180 Tablet 1    ibuprofen (MOTRIN) 800 MG Tab Take 1 Tablet by mouth every 8 hours as needed for Mild Pain. 90 Tablet 3    omeprazole (PRILOSEC) 20 MG delayed-release capsule Take 1 Capsule by mouth every day. In the morning before breakfast 90 Capsule 3    GENOTROPIN 1 MG Recon Soln       Somatropin 0.2 MG Recon Soln Inject 4 mg as instructed every day. Non-functioning pituitary gland S/P  Each      No current facility-administered medications for this visit.       ALLERGIES:  Patient has no known allergies.    PAST PSYCHIATRIC HISTORY  Anxiety and Depression  No admissions or suicide attempts    PAST PSYCHIATRIC MEDICATIONS  Wellbutrin  Xanax  Amitriptyline     MEDICAL HISTORY  Past Medical History:   Diagnosis Date    Anesthesia     \"hard to sedate\"    Arthritis     Osteo to lower back    Bowel habit changes     Bronchitis 6/17/2014    Dental disorder     All dental implants-upper permanent and lower snap in.    Diabetes (HCC)     oral meds    Early menopause     @ 39    GERD (gastroesophageal reflux disease)     occasional reflux    History of " migraine     History of traumatic brain injury     Slipped into bathtub. Rendered pituitary gland non-functional.    Hypothyroid 2009    Obesity 2009    Panhypopituitarism (HCC)     Psychiatric problem     anxiety and depression S/P TBI and wt gain.      Past Surgical History:   Procedure Laterality Date    [place] COLONOSCOPY,DIAGNOSTIC N/A 2021    Procedure: COLONOSCOPY;  Surgeon: Jem Rick M.D.;  Location: SURGERY SAME DAY [facility];  Service: Gastroenterology    COLONOSCOPY WITH POLYP N/A 2021    Procedure: COLONOSCOPY, WITH POLYPECTOMY;  Surgeon: Jem Rick M.D.;  Location: SURGERY SAME DAY [facility];  Service: Gastroenterology    DENTAL RESTORATION  10/2017    dental implants to all teeth    PRIMARY C SECTION      DENTAL EXTRACTION(S)      wisdom teeth         PHYSICAL EXAMINAION:  Vital signs: LMP  (LMP Unknown)   Musculoskeletal: Normal gait.   Abnormal movements: none    MENTAL STATUS EXAMINATION      General:   - Grooming and hygiene: Casual,   - Apparent distress: tense,   - Behavior: Tense  - Eye Contact:  Good,   - no psychomotor agitation or retardation    - Participation: Active verbal participation  Orientation: Alert and Fully Oriented to person, place and time  Mood: Anxious  Affect: Flexible,  Thought Process: Logical and Goal-directed  Thought Content: Denies suicidal or homicidal ideations, intent or plan   Perception: Denies auditory or visual hallucinations. No delusions noted   Attention span and concentration: Intact   Speech:Rate within normal limits and Volume within normal limits  Language: Appropriate   Insight: Good  Judgment: Good  Recent and remote memory: No gross evidence of memory deficits      DEPRESSION SCREENIN/12/2021     8:30 AM 2022     1:00 PM 3/8/2023     7:20 AM   Depression Screen (PHQ-2/PHQ-9)   PHQ-2 Total Score 0 0 0       Interpretation of PHQ-9 Total Score   Score Severity   1-4 No Depression   5-9 Mild Depression    10-14 Moderate Depression   15-19 Moderately Severe Depression   20-27 Severe Depression      SAFETY ASSESSMENT - SELF:    Does patient acknowledge current or past symptoms of dangerousness to self? no  History of suicide by family member: no  History of suicide by friend/significant other: no  Recent change in amount/specificity/intensity of suicidal thoughts or self-harm behavior? no  Current access to firearms, medications, or other identified means of suicide/self-harm? no  Protective factors present: family       SAFETY ASSESSMENT - OTHERS:    Does patient acknowledge current or past symptoms of aggressive behavior or risk to others? no  Recent change in amount/specificity/intensity of thoughts or threats to harm others? no  Current access to firearms/other identified means of harm? no      CURRENT RISK:       Suicidal: Low       Homicidal: Low       Self-Harm: Low       Relapse: Low       Crisis Safety Plan Reviewed Not Indicated    MEDICAL RECORDS/LABS/DIAGNOSTIC TESTS REVIEWED:  Component      Latest Ref Rng 4/13/2019 9/21/2022 11/22/2022   TSH      0.380 - 5.330 uIU/mL 0.310 (L)  0.330 (L)  0.860             [place]  records -   Reviewed      PLAN:  (1) CAROLA; (2) Chronic prescription benzodiazepine use  PHQ9: 12; CAROLA&: 13  Add Lexapro 5 mg daily X 1 week --> 10 mg daily for mood and anxiety.  Taper Xanax under Gabapentin cover to prevent withdrawal management.  Continue Wellbutrin  mg daily for depression. Patient not interested in stopping at this time.  Continue Amitriptyline for sleep.   Medication options, alternatives (including no medications) and medication risks/benefits/side effects were discussed in detail.  Explained importance of contraceptive measures while on psychotropic medications, educated to let provider know if ever pregnant or wanting to become pregnant. Verbalized understanding.  The patient was advised to call, message provider on MyChart, or come in to the clinic if symptoms  worsen or if any future questions/issues regarding their medications arise; the patient verbalized understanding and agreement.    The patient was educated to call 911, call the suicide hotline, or go to local ER if having thoughts of suicide or homicide; verbalized understanding.        Return to clinic in 1 month or sooner if symptoms worsen.  Next Appointment:  instruction provided on how to make the next appointment.     The proposed treatment plan was discussed with the patient who was provided the opportunity to ask questions and make suggestions regarding alternative treatment. Patient verbalized understanding and expressed agreement with the plan.     Thank you for allowing me to participate in the care of this patient.    Pablo Hayden M.D.  06/08/23    CC:   MOSES [email or web address]    This note was created using voice recognition software (Dragon). The accuracy of the dictation is limited by the abilities of the software. I have reviewed the note prior to signing, however some errors in grammar and context are still possible. If you have any questions related to this note please do not hesitate to contact our office.

## 2023-06-12 RX ORDER — LEVOTHYROXINE SODIUM 175 UG/1
175 TABLET ORAL
Qty: 90 TABLET | Refills: 3 | Status: SHIPPED | OUTPATIENT
Start: 2023-06-12 | End: 2023-06-20 | Stop reason: SDUPTHER

## 2023-06-12 NOTE — TELEPHONE ENCOUNTER
Received request via: Pharmacy    Was the patient seen in the last year in this department? Yes  LOV 03/14/2023  Does the patient have an active prescription (recently filled or refills available) for medication(s) requested? No    Does the patient have care home Plus and need 100 day supply (blood pressure, diabetes and cholesterol meds only)? Medication is not for cholesterol, blood pressure or diabetes and Patient does not have SCP

## 2023-06-13 DIAGNOSIS — F41.1 GAD (GENERALIZED ANXIETY DISORDER): ICD-10-CM

## 2023-06-13 DIAGNOSIS — Z79.899 CHRONIC PRESCRIPTION BENZODIAZEPINE USE: ICD-10-CM

## 2023-06-13 RX ORDER — ALPRAZOLAM 1 MG/1
1 TABLET ORAL 3 TIMES DAILY PRN
Qty: 90 TABLET | Refills: 0 | Status: SHIPPED | OUTPATIENT
Start: 2023-06-13 | End: 2023-07-13

## 2023-06-20 RX ORDER — CYANOCOBALAMIN 1000 UG/ML
INJECTION, SOLUTION INTRAMUSCULAR; SUBCUTANEOUS
Qty: 8 ML | Refills: 3 | Status: SHIPPED | OUTPATIENT
Start: 2023-06-20

## 2023-06-21 RX ORDER — LEVOTHYROXINE SODIUM 175 UG/1
175 TABLET ORAL
Qty: 90 TABLET | Refills: 3 | Status: SHIPPED | OUTPATIENT
Start: 2023-06-21 | End: 2024-02-01

## 2023-06-23 ENCOUNTER — PATIENT MESSAGE (OUTPATIENT)
Dept: MEDICAL GROUP | Facility: LAB | Age: 53
End: 2023-06-23
Payer: COMMERCIAL

## 2023-07-01 DIAGNOSIS — Z79.899 CHRONIC PRESCRIPTION BENZODIAZEPINE USE: ICD-10-CM

## 2023-07-01 DIAGNOSIS — F41.9 ANXIETY: ICD-10-CM

## 2023-07-03 RX ORDER — ESCITALOPRAM OXALATE 10 MG/1
TABLET ORAL
Qty: 34 TABLET | Refills: 0 | Status: SHIPPED | OUTPATIENT
Start: 2023-07-03 | End: 2023-07-20

## 2023-07-03 NOTE — TELEPHONE ENCOUNTER
[place] pt.    Received request via: Pharmacy    Was the patient seen in the last year in this department? Yes    Does the patient have an active prescription (recently filled or refills available) for medication(s) requested? No    Does the patient have jail [facility] and need 100 day supply (blood pressure, diabetes and cholesterol meds only)? Medication is not for cholesterol, blood pressure or diabetes and Patient does not have SCP

## 2023-07-20 ENCOUNTER — OFFICE VISIT (OUTPATIENT)
Dept: BEHAVIORAL HEALTH | Facility: CLINIC | Age: 53
End: 2023-07-20
Payer: COMMERCIAL

## 2023-07-20 DIAGNOSIS — Z79.899 CHRONIC PRESCRIPTION BENZODIAZEPINE USE: ICD-10-CM

## 2023-07-20 DIAGNOSIS — F41.1 GAD (GENERALIZED ANXIETY DISORDER): ICD-10-CM

## 2023-07-20 DIAGNOSIS — F51.01 PRIMARY INSOMNIA: ICD-10-CM

## 2023-07-20 PROCEDURE — 90833 PSYTX W PT W E/M 30 MIN: CPT | Performed by: PSYCHIATRY & NEUROLOGY

## 2023-07-20 PROCEDURE — 99214 OFFICE O/P EST MOD 30 MIN: CPT | Performed by: PSYCHIATRY & NEUROLOGY

## 2023-07-20 RX ORDER — GABAPENTIN 600 MG/1
600 TABLET ORAL 3 TIMES DAILY
Qty: 90 TABLET | Refills: 1 | Status: SHIPPED | OUTPATIENT
Start: 2023-07-20 | End: 2023-08-29 | Stop reason: SDUPTHER

## 2023-07-20 RX ORDER — AMITRIPTYLINE HYDROCHLORIDE 150 MG/1
TABLET ORAL
Qty: 90 TABLET | Refills: 3 | Status: SHIPPED | OUTPATIENT
Start: 2023-07-20 | End: 2023-08-29 | Stop reason: SDUPTHER

## 2023-07-20 NOTE — PROGRESS NOTES
PSYCHIATRY FOLLOW-UP NOTE      Name: Bella Garibay  MRN: [id number]  : 1970  Age: 52 y.o.  Date of assessment: 2023  PCP: ERIKA Kumari  Persons in attendance: Patient      REASON FOR VISIT/CHIEF COMPLAINT (as stated by Patient):  Bella Garibay is a 52 y.o., White female, attending follow-up appointment for mood and anxiety management.      HISTORY OF PRESENT ILLNESS:  Bella Garibay is a 52 y.o. old female with CAROLA, chronic prescription benzodiazepine use comes in today for follow up. Patient was last seen 1 month ago, and following treatment planning recommendations were done:  Add Lexapro 5 mg daily X 1 week --> 10 mg daily for mood and anxiety.  Taper Xanax under Gabapentin cover to prevent withdrawal management.  Continue Wellbutrin  mg daily for depression. Patient not interested in stopping at this time.  Continue Amitriptyline for sleep.     Patient was able to reduce Xanax from 100 mg 3 times daily to 1 mg once daily and is currently taking gabapentin 300 mg 3 times daily with no additive sedation or side effects.  She continues to have panic attacks on a daily basis and keeping Xanax as a last resort but ends up taking on a daily basis.  Patient is motivated to completely stop that and agreed with increasing gabapentin to 600 mg 3 times daily and assessing if Xanax can be further reduced based on her toleration and response.  Patient stopped Lexapro and reports not feeling well on this and agreed with considering Zoloft after reviewing the risk and benefit.    PSYCHOTHERAPY ASPECT OF SESSION (16 MIN):  Session dedicated to letting patient express her feelings related to persistence of anxiety symptoms.  Validation was provided for appropriate emotional responses.  Importance of monitoring for triggers that can destabilize mood and anxiety was emphasized.  Most part of the later session was dedicated to active listening and implementing supportive  "psychotherapy skills.      CURRENT MEDICATIONS:  Current Outpatient Medications   Medication Sig Dispense Refill    escitalopram (LEXAPRO) 10 MG Tab Take 0.5 Tablets by mouth 1/2 hour after dinner for 7 days, THEN 1 Tablet 1/2 hour after dinner for 30 days. 34 Tablet 0    levothyroxine (SYNTHROID) 175 MCG Tab Take 1 Tablet by mouth every morning on an empty stomach. 90 Tablet 3    cyanocobalamin (VITAMIN B-12) 1000 MCG/ML Solution INJECT INTRAMUSCULARLY 1 ML AS  DIRECTED EVERY 7 DAYS (DISCARD  28 DAYS AFTER FIRST USE) 8 mL 3    gabapentin (NEURONTIN) 100 MG Cap Take 1 Capsule by mouth 3 times a day. 90 Capsule 1    ketoconazole (NIZORAL) 2 % Cream APPLY TO AFFECTED AREA TOPICALLY EVERY DAY 60 g 1    buPROPion (WELLBUTRIN XL) 150 MG XL tablet Take 1 Tablet by mouth every morning. 90 Tablet 2    MOUNJARO 2.5 MG/0.5ML Solution Pen-injector INJECT UNDER THE SKIN ONCE A WEEK      phentermine 15 MG capsule TAKE 1 ORAL CAPSULE 2 TIMES A DAY E88.9      amitriptyline (ELAVIL) 150 MG Tab TAKE ONE TABLET BY MOUTH AT BEDTIME AS NEEDED FOR SLEEP 90 Tablet 3    amitriptyline (ELAVIL) 50 MG Tab TAKE 1 TO 2 TABLETS BY MOUTH AT BEDTIME AS NEEDED FOR SLEEP 90 Tablet 3    Empagliflozin (JARDIANCE) 25 MG Tab Take 1 Tablet by mouth every day. 90 Tablet 3    cyclobenzaprine (FLEXERIL) 10 mg Tab Take 1 Tablet by mouth 2 times a day as needed. 180 Tablet 1    ibuprofen (MOTRIN) 800 MG Tab Take 1 Tablet by mouth every 8 hours as needed for Mild Pain. 90 Tablet 3    omeprazole (PRILOSEC) 20 MG delayed-release capsule Take 1 Capsule by mouth every day. In the morning before breakfast 90 Capsule 3    GENOTROPIN 1 MG Recon Soln       Somatropin 0.2 MG Recon Soln Inject 4 mg as instructed every day. Non-functioning pituitary gland S/P  Each      No current facility-administered medications for this visit.       MEDICAL HISTORY  Past Medical History:   Diagnosis Date    Anesthesia     \"hard to sedate\"    Arthritis     Osteo to lower back "    Bowel habit changes     Bronchitis 6/17/2014    Dental disorder     All dental implants-upper permanent and lower snap in.    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    Hypothyroid 7/24/2009    Obesity 7/24/2009    Panhypopituitarism (HCC)     Psychiatric problem     anxiety and depression S/P TBI and wt gain.      Past Surgical History:   Procedure Laterality Date    [place] COLONOSCOPY,DIAGNOSTIC N/A 2/25/2021    Procedure: COLONOSCOPY;  Surgeon: Jem Rick M.D.;  Location: SURGERY SAME DAY [facility];  Service: Gastroenterology    COLONOSCOPY WITH POLYP N/A 2/25/2021    Procedure: COLONOSCOPY, WITH POLYPECTOMY;  Surgeon: Jem Rick M.D.;  Location: SURGERY SAME DAY [facility];  Service: Gastroenterology    DENTAL RESTORATION  10/2017    dental implants to all teeth    PRIMARY C SECTION  1995    DENTAL EXTRACTION(S)  1980's    wisdom teeth       PAST PSYCHIATRIC HISTORY  Anxiety and Depression  No admissions or suicide attempts     PAST PSYCHIATRIC MEDICATIONS  Wellbutrin  Xanax  Amitriptyline       REVIEW OF SYSTEMS:        Constitutional  Obesity   Eyes negative   Ears/Nose/Mouth/Throat negative   Cardiovascular negative   Respiratory negative   Gastrointestinal negative   Genitourinary negative   Muscular negative   Integumentary negative   Neurological negative   Endocrine  Hypothyroidism; DM; Hypopituitarism   Hematologic/Lymphatic negative     PHYSICAL EXAMINAION:  Vital signs: LMP  (LMP Unknown)   Musculoskeletal: Normal gait.   Abnormal movements: none      MENTAL STATUS EXAMINATION      General:   - Grooming and hygiene: Casual,   - Apparent distress: tense,   - Behavior: Tense  - Eye Contact:  Good,   - no psychomotor agitation or retardation    - Participation: Active verbal  participation  Orientation: Alert and Fully Oriented to person, place and time  Mood: Depressed and Anxious  Affect: Flexible,  Thought Process: Logical and Goal-directed  Thought Content: Denies suicidal or homicidal ideations, intent or plan   Perception: Denies auditory or visual hallucinations. No delusions noted   Attention span and concentration: Intact   Speech:Rate within normal limits and Volume within normal limits  Language: Appropriate   Insight: Good  Judgment: Good  Recent and remote memory: No gross evidence of memory deficits        DEPRESSION SCREENIN/13/2022     1:00 PM 3/8/2023     7:20 AM 2023     2:00 PM   Depression Screen (PHQ-2/PHQ-9)   PHQ-2 Total Score 0 0 2   PHQ-9 Total Score   12       Interpretation of PHQ-9 Total Score   Score Severity   1-4 No Depression   5-9 Mild Depression   10-14 Moderate Depression   15-19 Moderately Severe Depression   20-27 Severe Depression    CURRENT RISK:       Suicidal: Low       Homicidal: Low       Self-Harm: Low       Relapse: Low       Crisis Safety Plan Reviewed Not Indicated       If evidence of imminent risk is present, intervention/plan:      MEDICAL RECORDS/LABS/DIAGNOSTIC TESTS REVIEWED:  No new lab since last visit     [place]  records -   Reviewed     PLAN:  (1) CAROLA; (2) Chronic prescription benzodiazepine use  Slow improvement  Add Zoloft 25 mg daily X 1 week --> 50 mg daily for mood and anxiety.  Increase Gabapentin 600 mg TID for anxiety and panic attacks.  Taper Xanax under Gabapentin cover to prevent withdrawal management.  Continue Wellbutrin  mg daily for depression. Patient not interested in stopping at this time.  Continue Amitriptyline 150 mg HS for sleep.   Medication options, alternatives (including no medications) and medication risks/benefits/side effects were discussed in detail.  Explained importance of contraceptive measures while on psychotropic medications, educated to let provider know if ever pregnant or  wanting to become pregnant. Verbalized understanding.  The patient was advised to call, message provider on MyChart, or come in to the clinic if symptoms worsen or if any future questions/issues regarding their medications arise; the patient verbalized understanding and agreement.    The patient was educated to call 911, call the suicide hotline, or go to local ER if having thoughts of suicide or homicide; verbalized understanding.    Billing Coding based on:  14894 based on [facility]  [id number]: based on psychotherapy timing    Return to clinic in 1 month or sooner if symptoms worsen.  Next Appointment: instruction provided on how to make the next appointment.     The proposed treatment plan was discussed with the patient who was provided the opportunity to ask questions and make suggestions regarding alternative treatment. Patient verbalized understanding and expressed agreement with the plan.       Pablo Hayden M.D.  07/20/23    This note was created using voice recognition software (Dragon). The accuracy of the dictation is limited by the abilities of the software. I have reviewed the note prior to signing, however some errors in grammar and context are still possible. If you have any questions related to this note please do not hesitate to contact our office.

## 2023-08-20 DIAGNOSIS — Z79.899 CHRONIC PRESCRIPTION BENZODIAZEPINE USE: ICD-10-CM

## 2023-08-20 DIAGNOSIS — F41.1 GAD (GENERALIZED ANXIETY DISORDER): ICD-10-CM

## 2023-08-24 ENCOUNTER — PATIENT MESSAGE (OUTPATIENT)
Dept: HEALTH INFORMATION MANAGEMENT | Facility: OTHER | Age: 53
End: 2023-08-24

## 2023-08-29 ENCOUNTER — OFFICE VISIT (OUTPATIENT)
Dept: BEHAVIORAL HEALTH | Facility: CLINIC | Age: 53
End: 2023-08-29
Payer: COMMERCIAL

## 2023-08-29 DIAGNOSIS — Z79.899 CHRONIC PRESCRIPTION BENZODIAZEPINE USE: ICD-10-CM

## 2023-08-29 DIAGNOSIS — F41.1 GAD (GENERALIZED ANXIETY DISORDER): ICD-10-CM

## 2023-08-29 DIAGNOSIS — F51.01 PRIMARY INSOMNIA: ICD-10-CM

## 2023-08-29 PROCEDURE — 99214 OFFICE O/P EST MOD 30 MIN: CPT | Performed by: PSYCHIATRY & NEUROLOGY

## 2023-08-29 RX ORDER — BUPROPION HYDROCHLORIDE 150 MG/1
150 TABLET ORAL EVERY MORNING
Qty: 90 TABLET | Refills: 2 | Status: SHIPPED | OUTPATIENT
Start: 2023-08-29 | End: 2023-09-14

## 2023-08-29 RX ORDER — SERTRALINE HYDROCHLORIDE 100 MG/1
TABLET, FILM COATED ORAL
Qty: 53 TABLET | Refills: 0 | Status: SHIPPED | OUTPATIENT
Start: 2023-08-29 | End: 2023-09-20

## 2023-08-29 RX ORDER — LORAZEPAM 1 MG/1
1 TABLET ORAL 2 TIMES DAILY PRN
Qty: 60 TABLET | Refills: 1 | Status: SHIPPED | OUTPATIENT
Start: 2023-08-29 | End: 2023-09-28

## 2023-08-29 RX ORDER — GABAPENTIN 600 MG/1
600 TABLET ORAL 3 TIMES DAILY
Qty: 90 TABLET | Refills: 1 | Status: SHIPPED | OUTPATIENT
Start: 2023-08-29 | End: 2024-02-13

## 2023-08-29 RX ORDER — AMITRIPTYLINE HYDROCHLORIDE 150 MG/1
TABLET ORAL
Qty: 90 TABLET | Refills: 3 | Status: SHIPPED | OUTPATIENT
Start: 2023-08-29 | End: 2023-09-05

## 2023-08-29 NOTE — PROGRESS NOTES
PSYCHIATRY FOLLOW-UP NOTE      Name: Bella Garibay  MRN: [id number]  : 1970  Age: 53 y.o.  Date of assessment: 2023  PCP: ERIKA Kumari  Persons in attendance: Patient      REASON FOR VISIT/CHIEF COMPLAINT (as stated by Patient):  Bella Garibay is a 53 y.o., White female, attending follow-up appointment for mood and anxiety management.      HISTORY OF PRESENT ILLNESS:  Bella Garibay is a 53 y.o. old female with CAROLA and chronic prescription benzodiazepine use comes in today for follow up. Patient was last seen 1 month ago, and following treatment planning recommendations were done:  Add Zoloft 25 mg daily X 1 week --> 50 mg daily for mood and anxiety.  Increase Gabapentin 600 mg TID for anxiety and panic attacks.  Taper Xanax under Gabapentin cover to prevent withdrawal management.  Continue Wellbutrin  mg daily for depression. Patient not interested in stopping at this time.  Continue Amitriptyline 150 mg HS for sleep.         Compliant with addition of Zoloft and increase in gabapentin with no side effects.  Her need for Xanax has gone down to 0.5 mg in the morning, 0.5 mg in the afternoon time and 1 mg at bedtime (compared to 1 mg 3 times daily dosing).   Agreed with increasing Zoloft to target underlying anxiety more effectively and patient is motivated to gradually reduce the Xanax to 0.5 mg 3 times daily and then further taper gradually based on her toleration and response.  She describes Wellbutrin as helpful for anxiety.  She is on Wellbutrin and phentermine which have anxiety any property but patient feels they were helpful for anxiety improvement.      CURRENT MEDICATIONS:  Current Outpatient Medications   Medication Sig Dispense Refill    levothyroxine (SYNTHROID) 175 MCG Tab Take 1 Tablet by mouth every morning on an empty stomach. 90 Tablet 3    sertraline (ZOLOFT) 50 MG Tab TAKE 0.5 TABLETS BY MOUTH 1/2 HOUR AFTER DINNER FOR 7 DAYS, THEN 1  "TABLET 1/2 HOUR AFTER DINNER FOR 30 DAYS. 34 Tablet 0    amitriptyline (ELAVIL) 150 MG Tab TAKE ONE TABLET BY MOUTH AT BEDTIME AS NEEDED FOR SLEEP 90 Tablet 3    gabapentin (NEURONTIN) 600 MG tablet Take 1 Tablet by mouth 3 times a day. 90 Tablet 1    cyanocobalamin (VITAMIN B-12) 1000 MCG/ML Solution INJECT INTRAMUSCULARLY 1 ML AS  DIRECTED EVERY 7 DAYS (DISCARD  28 DAYS AFTER FIRST USE) 8 mL 3    ketoconazole (NIZORAL) 2 % Cream APPLY TO AFFECTED AREA TOPICALLY EVERY DAY 60 g 1    buPROPion (WELLBUTRIN XL) 150 MG XL tablet Take 1 Tablet by mouth every morning. 90 Tablet 2    MOUNJARO 2.5 MG/0.5ML Solution Pen-injector INJECT UNDER THE SKIN ONCE A WEEK      phentermine 15 MG capsule TAKE 1 ORAL CAPSULE 2 TIMES A DAY E88.9      Empagliflozin (JARDIANCE) 25 MG Tab Take 1 Tablet by mouth every day. 90 Tablet 3    cyclobenzaprine (FLEXERIL) 10 mg Tab Take 1 Tablet by mouth 2 times a day as needed. 180 Tablet 1    ibuprofen (MOTRIN) 800 MG Tab Take 1 Tablet by mouth every 8 hours as needed for Mild Pain. 90 Tablet 3    omeprazole (PRILOSEC) 20 MG delayed-release capsule Take 1 Capsule by mouth every day. In the morning before breakfast 90 Capsule 3    GENOTROPIN 1 MG Recon Soln       Somatropin 0.2 MG Recon Soln Inject 4 mg as instructed every day. Non-functioning pituitary gland S/P  Each      No current facility-administered medications for this visit.       MEDICAL HISTORY  Past Medical History:   Diagnosis Date    Anesthesia     \"hard to sedate\"    Arthritis     Osteo to lower back    Bowel habit changes     Bronchitis 6/17/2014    Dental disorder     All dental implants-upper permanent and lower snap in.    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    Hypothyroid 7/24/2009    Obesity 7/24/2009    " Panhypopituitarism (HCC)     Psychiatric problem     anxiety and depression S/P TBI and wt gain.      Past Surgical History:   Procedure Laterality Date    AL COLONOSCOPY,DIAGNOSTIC N/A 2/25/2021    Procedure: COLONOSCOPY;  Surgeon: Jem Rick M.D.;  Location: SURGERY SAME DAY [facility];  Service: Gastroenterology    COLONOSCOPY WITH POLYP N/A 2/25/2021    Procedure: COLONOSCOPY, WITH POLYPECTOMY;  Surgeon: Jem Rick M.D.;  Location: SURGERY SAME DAY [facility];  Service: Gastroenterology    DENTAL RESTORATION  10/2017    dental implants to all teeth    PRIMARY C SECTION  1995    DENTAL EXTRACTION(S)  1980's    wisdom teeth       PAST PSYCHIATRIC MEDICATIONS  Lexapro, Zoloft  Wellbutrin  Amitriptyline     Xanax, Gabapentin      REVIEW OF SYSTEMS:        Constitutional  Obesity   Eyes negative   Ears/Nose/Mouth/Throat negative   Cardiovascular negative   Respiratory negative   Gastrointestinal negative   Genitourinary negative   Muscular negative   Integumentary negative   Neurological negative   Endocrine  Hypothyroidism; DM; Hypopituitarism   Hematologic/Lymphatic negative     PHYSICAL EXAMINAION:  Vital signs: LMP  (LMP Unknown)   Musculoskeletal: Normal gait.   Abnormal movements: none      MENTAL STATUS EXAMINATION      General:   - Grooming and hygiene: Casual,   - Apparent distress: none,   - Behavior: Calm  - Eye Contact:  Good,   - no psychomotor agitation or retardation    - Participation: Active verbal participation  Orientation: Alert and Fully Oriented to person, place and time  Mood: Anxious  Affect: Flexible,  Thought Process: Logical and Goal-directed  Thought Content: Denies suicidal or homicidal ideations, intent or plan   Perception: Denies auditory or visual hallucinations. No delusions noted   Attention span and concentration: Intact   Speech:Rate within normal limits and Volume within normal limits  Language: Appropriate   Insight: Good  Judgment: Good  Recent and remote memory: No gross  evidence of memory deficits        DEPRESSION SCREENIN/13/2022     1:00 PM 3/8/2023     7:20 AM 2023     2:00 PM   Depression Screen (PHQ-2/PHQ-9)   PHQ-2 Total Score 0 0 2   PHQ-9 Total Score   12       Interpretation of PHQ-9 Total Score   Score Severity   1-4 No Depression   5-9 Mild Depression   10-14 Moderate Depression   15-19 Moderately Severe Depression   20-27 Severe Depression    CURRENT RISK:       Suicidal: Low       Homicidal: Low       Self-Harm: Low       Relapse: Low       Crisis Safety Plan Reviewed Not Indicated       If evidence of imminent risk is present, intervention/plan:      MEDICAL RECORDS/LABS/DIAGNOSTIC TESTS REVIEWED:  No new lab since last visit     [place]  records -   Reviewed     PLAN:  (1) CAROLA; (2) Chronic prescription benzodiazepine use  Slow improvement  Increase Zoloft 100 mg daily X 3 weeks --> if no improvement seen, increase to 150 mg daily for mood and anxiety.  Continue Gabapentin 600 mg TID for anxiety and panic attacks.  Continue Xanax taper under Zoloft & Gabapentin cover to prevent withdrawal management.  Continue Wellbutrin  mg daily for depression. Patient not interested in stopping at this time.  Continue Amitriptyline 150 mg HS for sleep.   Medication options, alternatives (including no medications) and medication risks/benefits/side effects were discussed in detail.  Explained importance of contraceptive measures while on psychotropic medications, educated to let provider know if ever pregnant or wanting to become pregnant. Verbalized understanding.  The patient was advised to call, message provider on MyChart, or come in to the clinic if symptoms worsen or if any future questions/issues regarding their medications arise; the patient verbalized understanding and agreement.    The patient was educated to call 911, call the suicide hotline, or go to local ER if having thoughts of suicide or homicide; verbalized understanding.    Billing Coding based  on:  95316 based on MDM    Return to clinic in 6 weeks or sooner if symptoms worsen.  Next Appointment: instruction provided on how to make the next appointment.     The proposed treatment plan was discussed with the patient who was provided the opportunity to ask questions and make suggestions regarding alternative treatment. Patient verbalized understanding and expressed agreement with the plan.       Pablo Hayden M.D.  08/29/23    This note was created using voice recognition software (Dragon). The accuracy of the dictation is limited by the abilities of the software. I have reviewed the note prior to signing, however some errors in grammar and context are still possible. If you have any questions related to this note please do not hesitate to contact our office.

## 2023-09-03 DIAGNOSIS — F51.01 PRIMARY INSOMNIA: ICD-10-CM

## 2023-09-05 RX ORDER — AMITRIPTYLINE HYDROCHLORIDE 150 MG/1
TABLET ORAL
Qty: 90 TABLET | Refills: 3 | Status: SHIPPED | OUTPATIENT
Start: 2023-09-05

## 2023-09-13 DIAGNOSIS — F41.1 GAD (GENERALIZED ANXIETY DISORDER): ICD-10-CM

## 2023-09-14 RX ORDER — BUPROPION HYDROCHLORIDE 150 MG/1
150 TABLET ORAL EVERY MORNING
Qty: 90 TABLET | Refills: 3 | Status: SHIPPED | OUTPATIENT
Start: 2023-09-14

## 2023-09-14 NOTE — TELEPHONE ENCOUNTER
Received request via: Pharmacy    Was the patient seen in the last year in this department? Yes  LOV : 3/14/2023   Does the patient have an active prescription (recently filled or refills available) for medication(s) requested? Yes.   Does the patient have residential Plus and need 100 day supply (blood pressure, diabetes and cholesterol meds only)? Patient does not have SCP

## 2023-09-20 DIAGNOSIS — Z79.899 CHRONIC PRESCRIPTION BENZODIAZEPINE USE: ICD-10-CM

## 2023-09-20 DIAGNOSIS — F41.1 GAD (GENERALIZED ANXIETY DISORDER): ICD-10-CM

## 2023-09-20 RX ORDER — SERTRALINE HYDROCHLORIDE 100 MG/1
TABLET, FILM COATED ORAL
Qty: 53 TABLET | Refills: 0 | Status: SHIPPED | OUTPATIENT
Start: 2023-09-20 | End: 2023-10-31

## 2023-10-17 DIAGNOSIS — Z79.899 CHRONIC PRESCRIPTION BENZODIAZEPINE USE: ICD-10-CM

## 2023-10-17 RX ORDER — ALPRAZOLAM 1 MG/1
1 TABLET ORAL 2 TIMES DAILY PRN
Qty: 60 TABLET | Refills: 0 | Status: SHIPPED | OUTPATIENT
Start: 2023-10-17 | End: 2023-11-16

## 2023-11-12 NOTE — ANESTHESIA POSTPROCEDURE EVALUATION
Patient: Bella Garibay    Procedure Summary     Date: 02/25/21 Room / Location: [facility] ROOM 26 / SURGERY SAME DAY [facility]    Anesthesia Start: 0954 Anesthesia Stop: 1045    Procedures:       COLONOSCOPY (N/A Anus)      COLONOSCOPY, WITH POLYPECTOMY (N/A Anus) Diagnosis: (colon polyp)    Surgeons: Jem Rick M.D. Responsible Provider: Abebe Shankar M.D.    Anesthesia Type: MAC ASA Status: 3          Final Anesthesia Type: MAC  Last vitals  BP   Blood Pressure: 128/81    Temp   36.5 °C (97.7 °F)    Pulse   98   Resp   16    SpO2   95 %      Anesthesia Post Evaluation    Patient location during evaluation: PACU  Patient participation: complete - patient participated  Level of consciousness: awake and alert  Pain score: 0    Airway patency: patent  Anesthetic complications: no  Cardiovascular status: hemodynamically stable  Respiratory status: acceptable  Hydration status: euvolemic    PONV: none          No complications documented.     Nurse Pain Score: 1 (NPRS)        
provider notified

## 2023-11-30 NOTE — PROCEDURE: BIOPSY BY SHAVE METHOD
Ultrasound Probe Disinfection    A transvaginal ultrasound was performed. Prior to use, disinfection was performed with High Level Disinfection Process ([employer]). Probe serial number [id number]: [id number] was used.       Segundo Kwan  11/30/23  2:42 PM
Anesthesia Type: 1% lidocaine with 1:100,000 epinephrine and 408mcg clindamycin/ml and a 1:10 solution of 8.4% sodium bicarbonate
Destruction After The Procedure: No
Lab: [id number]
Anesthesia Volume In Cc: 0
Electrodesiccation And Curettage Text: The wound bed was treated with electrodesiccation and curettage after the biopsy was performed.
Notification Instructions: Patient will be notified of biopsy results. However, patient instructed to call the office if not contacted within 2 weeks.
Biopsy Method: Dermablade
Post-Care Instructions: I reviewed with the patient in detail post-care instructions. Patient is to keep the biopsy site dry overnight, and then apply bacitracin twice daily until healed. Patient may apply hydrogen peroxide soaks to remove any crusting.
Hemostasis: Pressure
Electrodesiccation Text: The wound bed was treated with electrodesiccation after the biopsy was performed.
Biopsy Type: H and E
Wound Care: Petrolatum
Was A Bandage Applied: Yes
Cryotherapy Text: The wound bed was treated with cryotherapy after the biopsy was performed.
Billing Type: Third-Party Bill
Silver Nitrate Text: The wound bed was treated with silver nitrate after the biopsy was performed.
Detail Level: Detailed
Dressing: bandage
Consent: Written consent was obtained and risks were reviewed including but not limited to scarring, infection, bleeding, scabbing, incomplete removal, nerve damage and allergy to anesthesia.
Depth Of Biopsy: dermis
Lab Facility: [id number]
Type Of Destruction Used: Curettage
Curettage Text: The wound bed was treated with curettage after the biopsy was performed.

## 2023-12-22 ENCOUNTER — OFFICE VISIT (OUTPATIENT)
Dept: URGENT CARE | Facility: CLINIC | Age: 53
End: 2023-12-22
Payer: COMMERCIAL

## 2023-12-22 VITALS
RESPIRATION RATE: 18 BRPM | WEIGHT: 253.2 LBS | HEART RATE: 80 BPM | BODY MASS INDEX: 42.19 KG/M2 | TEMPERATURE: 98.4 F | OXYGEN SATURATION: 93 % | SYSTOLIC BLOOD PRESSURE: 122 MMHG | DIASTOLIC BLOOD PRESSURE: 68 MMHG | HEIGHT: 65 IN

## 2023-12-22 DIAGNOSIS — N30.00 ACUTE CYSTITIS WITHOUT HEMATURIA: ICD-10-CM

## 2023-12-22 LAB
APPEARANCE UR: NORMAL
BILIRUB UR STRIP-MCNC: NEGATIVE MG/DL
COLOR UR AUTO: NORMAL
GLUCOSE UR STRIP.AUTO-MCNC: NORMAL MG/DL
KETONES UR STRIP.AUTO-MCNC: NEGATIVE MG/DL
LEUKOCYTE ESTERASE UR QL STRIP.AUTO: NORMAL
NITRITE UR QL STRIP.AUTO: NEGATIVE
PH UR STRIP.AUTO: 6 [PH] (ref 5–8)
PROT UR QL STRIP: NEGATIVE MG/DL
RBC UR QL AUTO: NEGATIVE
SP GR UR STRIP.AUTO: 1.02
UROBILINOGEN UR STRIP-MCNC: NORMAL MG/DL

## 2023-12-22 PROCEDURE — 3078F DIAST BP <80 MM HG: CPT

## 2023-12-22 PROCEDURE — 3074F SYST BP LT 130 MM HG: CPT

## 2023-12-22 PROCEDURE — 99213 OFFICE O/P EST LOW 20 MIN: CPT

## 2023-12-22 PROCEDURE — 81002 URINALYSIS NONAUTO W/O SCOPE: CPT

## 2023-12-22 RX ORDER — NITROFURANTOIN 25; 75 MG/1; MG/1
100 CAPSULE ORAL 2 TIMES DAILY
Qty: 10 CAPSULE | Refills: 0 | Status: SHIPPED | OUTPATIENT
Start: 2023-12-22 | End: 2023-12-27

## 2023-12-22 ASSESSMENT — FIBROSIS 4 INDEX: FIB4 SCORE: 0.54

## 2023-12-23 ASSESSMENT — ENCOUNTER SYMPTOMS
FEVER: 0
ABDOMINAL PAIN: 0
VOMITING: 0
FLANK PAIN: 0
NAUSEA: 0
BACK PAIN: 0

## 2023-12-23 NOTE — PROGRESS NOTES
Subjective:   Bella Garibay is a 53 y.o. female who presents for Dysuria (A couple days: Polyuria, pain at the end of urinating. )      HPI: This is a 53-year-old female who presents today for UTI symptoms.  This is a new problem.  Patient reports developing pain with urination and urinary frequency.  She reports symptoms developed over the last few days.  She denies vaginal discharge or vaginal discomfort.  No fevers, pelvic pain, back pain, abdominal pain.  She denies history of recurrent UTIs in the past.  No other complaints to report today.    Review of Systems   Constitutional:  Negative for fever.   Gastrointestinal:  Negative for abdominal pain, nausea and vomiting.   Genitourinary:  Positive for dysuria and frequency. Negative for flank pain, hematuria and urgency.   Musculoskeletal:  Negative for back pain.       Medications:    Current Outpatient Medications on File Prior to Visit   Medication Sig Dispense Refill    buPROPion (WELLBUTRIN XL) 150 MG XL tablet TAKE 1 TABLET BY MOUTH IN THE  MORNING 90 Tablet 3    amitriptyline (ELAVIL) 150 MG Tab TAKE 1 TABLET BY MOUTH AT  BEDTIME AS NEEDED FOR SLEEP 90 Tablet 3    gabapentin (NEURONTIN) 600 MG tablet Take 1 Tablet by mouth 3 times a day. 90 Tablet 1    levothyroxine (SYNTHROID) 175 MCG Tab Take 1 Tablet by mouth every morning on an empty stomach. 90 Tablet 3    cyanocobalamin (VITAMIN B-12) 1000 MCG/ML Solution INJECT INTRAMUSCULARLY 1 ML AS  DIRECTED EVERY 7 DAYS (DISCARD  28 DAYS AFTER FIRST USE) 8 mL 3    MOUNJARO 2.5 MG/0.5ML Solution Pen-injector INJECT UNDER THE SKIN ONCE A WEEK      phentermine 15 MG capsule TAKE 1 ORAL CAPSULE 2 TIMES A DAY E88.9      Empagliflozin (JARDIANCE) 25 MG Tab Take 1 Tablet by mouth every day. 90 Tablet 3    cyclobenzaprine (FLEXERIL) 10 mg Tab Take 1 Tablet by mouth 2 times a day as needed. 180 Tablet 1    ibuprofen (MOTRIN) 800 MG Tab Take 1 Tablet by mouth every 8 hours as needed for Mild Pain. 90 Tablet 3     "omeprazole (PRILOSEC) 20 MG delayed-release capsule Take 1 Capsule by mouth every day. In the morning before breakfast 90 Capsule 3    GENOTROPIN 1 MG Recon Soln       ketoconazole (NIZORAL) 2 % Cream APPLY TO AFFECTED AREA TOPICALLY EVERY DAY (Patient not taking: Reported on 12/22/2023) 60 g 1    Somatropin 0.2 MG Recon Soln Inject 4 mg as instructed every day. Non-functioning pituitary gland S/P TBI (Patient not taking: Reported on 12/22/2023) 116 Each      No current facility-administered medications on file prior to visit.        Allergies:   Patient has no known allergies.    Problem List:   Patient Active Problem List   Diagnosis    Obesity    CAROLA (generalized anxiety disorder)    Insomnia    Hypothyroidism due to acquired atrophy of thyroid    Right knee pain    BMI 45.0-49.9, adult ([facility])    Controlled type 2 diabetes mellitus without complication ([facility])    Chronic bilateral low back pain with right-sided sciatica    Tinea versicolor    Other constipation    Hypopituitarism ([facility]) - \"panhypopituitarism\"    Granuloma annulare    Generalized headaches    Chronic prescription benzodiazepine use        Surgical History:  Past Surgical History:   Procedure Laterality Date    [place] COLONOSCOPY,DIAGNOSTIC N/A 2/25/2021    Procedure: COLONOSCOPY;  Surgeon: Jem Rick M.D.;  Location: SURGERY SAME DAY [facility];  Service: Gastroenterology    COLONOSCOPY WITH POLYP N/A 2/25/2021    Procedure: COLONOSCOPY, WITH POLYPECTOMY;  Surgeon: Jem Rick M.D.;  Location: SURGERY SAME DAY [facility];  Service: Gastroenterology    DENTAL RESTORATION  10/2017    dental implants to all teeth    PRIMARY C SECTION  1995    DENTAL EXTRACTION(S)  1980's    wisdom teeth       Past Social Hx:   Social History     Tobacco Use    Smoking status: Some Days     Current packs/day: 0.25     Types: Cigarettes    Smokeless tobacco: Never    Tobacco comments:     reports 4-5 cig pre day   Vaping Use    Vaping Use: Never used   Substance Use Topics " "   Alcohol use: Not Currently    Drug use: Not Currently          Problem list, medications, and allergies reviewed by myself today in Epic.     Objective:     /68   Pulse 80   Temp 36.9 °C (98.4 °F)   Resp 18   Ht 1.651 m (5' 5\")   Wt 115 kg (253 lb 3.2 oz)   LMP  (LMP Unknown)   SpO2 93%   BMI 42.13 kg/m²     Physical Exam  Vitals and nursing note reviewed.   Constitutional:       General: She is not in acute distress.     Appearance: Normal appearance. She is normal weight. She is not ill-appearing, toxic-appearing or diaphoretic.   HENT:      Head: Normocephalic and atraumatic.   Cardiovascular:      Rate and Rhythm: Normal rate and regular rhythm.      Pulses: Normal pulses.      Heart sounds: Normal heart sounds. No murmur heard.     No friction rub. No gallop.   Pulmonary:      Effort: Pulmonary effort is normal. No respiratory distress.      Breath sounds: Normal breath sounds. No stridor. No wheezing, rhonchi or rales.   Chest:      Chest wall: No tenderness.   Abdominal:      Tenderness: There is no right CVA tenderness or left CVA tenderness.   Musculoskeletal:      Cervical back: Neck supple. No tenderness.   Lymphadenopathy:      Cervical: No cervical adenopathy.   Skin:     General: Skin is warm and dry.      Capillary Refill: Capillary refill takes less than 2 seconds.   Neurological:      General: No focal deficit present.      Mental Status: She is alert and oriented to person, place, and time. Mental status is at baseline.      Cranial Nerves: No cranial nerve deficit.      Motor: No weakness.      Gait: Gait normal.   Psychiatric:         Mood and Affect: Mood normal.         Behavior: Behavior normal.         Thought Content: Thought content normal.         Judgment: Judgment normal.         Assessment/Plan:     Diagnosis and associated orders:   1. Acute cystitis without hematuria  POCT Urinalysis    nitrofurantoin (MACROBID) 100 MG Cap         Results for orders placed or " performed in visit on 12/22/23   POCT Urinalysis   Result Value Ref Range    POC Color DARK YELLOW Negative    POC Appearance SLIGHTLY CLOUDY Negative    POC Glucose >=1000 mg/dL Negative mg/dL    POC Bilirubin NEGATIVE Negative mg/dL    POC Ketones NEGATIVE Negative mg/dL    POC Specific Gravity 1.025 <1.005 - >1.030    POC Blood NEGATIVE Negative    POC Urine PH 6.0 5.0 - 8.0    POC Protein NEGATIVE Negative mg/dL    POC Urobiligen 0.2 E.U./dL Negative (0.2) mg/dL    POC Nitrites NEGATIVE Negative    POC Leukocyte Esterase TRACE Negative          Comments/MDM:   Pt is clinically stable at today's acute urgent care visit.  No acute distress noted. Appropriate for outpatient management at this time.     Acute problem.  Patient presents today for UTI symptoms.  Patient noted to have cloudy urine with trace leukocytes.  Given her symptoms, she will be treated for acute cystitis with 5-day course of nitrofurantoin.  Advised patient to begin taking antibiotic as prescribed and stay well-hydrated. Patient is to return to UC or go to ER for any new or worsening signs or symptoms, and follow with with PCP for recheck. Patient is agreeable with plan of care and verbalizes good understanding.             Discussed DDx, management options (risks,benefits, and alternatives to planned treatment), natural progression and supportive care.  Expressed understanding and the treatment plan was agreed upon. Questions were encouraged and answered   Return to urgent care prn if new or worsening sx or if there is no improvement in condition prn.    Educated in Red flags and indications to immediately call 911 or present to the Emergency Department.   Advised the patient to follow-up with the primary care physician for recheck, reevaluation, and consideration of further management.    I personally reviewed prior external notes and test results pertinent to today's visit.  I have independently reviewed and interpreted all diagnostics  ordered during this urgent care acute visit.     Please note that this dictation was created using voice recognition software. I have made a reasonable attempt to correct obvious errors, but I expect that there are errors of grammar and possibly content that I did not discover before finalizing the note.    This note was electronically signed by PADMINI Beckford

## 2023-12-28 ENCOUNTER — APPOINTMENT (OUTPATIENT)
Dept: URGENT CARE | Facility: CLINIC | Age: 53
End: 2023-12-28
Payer: COMMERCIAL

## 2023-12-28 ENCOUNTER — OFFICE VISIT (OUTPATIENT)
Dept: MEDICAL GROUP | Facility: LAB | Age: 53
End: 2023-12-28
Payer: COMMERCIAL

## 2023-12-28 ENCOUNTER — HOSPITAL ENCOUNTER (OUTPATIENT)
Facility: MEDICAL CENTER | Age: 53
End: 2023-12-28
Attending: FAMILY MEDICINE
Payer: COMMERCIAL

## 2023-12-28 VITALS
SYSTOLIC BLOOD PRESSURE: 120 MMHG | BODY MASS INDEX: 42.15 KG/M2 | OXYGEN SATURATION: 98 % | RESPIRATION RATE: 16 BRPM | DIASTOLIC BLOOD PRESSURE: 78 MMHG | HEIGHT: 65 IN | WEIGHT: 253 LBS | TEMPERATURE: 96.9 F | HEART RATE: 80 BPM

## 2023-12-28 DIAGNOSIS — N39.0 URINARY TRACT INFECTION WITHOUT HEMATURIA, SITE UNSPECIFIED: ICD-10-CM

## 2023-12-28 LAB
APPEARANCE UR: CLEAR
BILIRUB UR STRIP-MCNC: NORMAL MG/DL
COLOR UR AUTO: NORMAL
GLUCOSE UR STRIP.AUTO-MCNC: 500 MG/DL
KETONES UR STRIP.AUTO-MCNC: NORMAL MG/DL
LEUKOCYTE ESTERASE UR QL STRIP.AUTO: NORMAL
NITRITE UR QL STRIP.AUTO: NORMAL
PH UR STRIP.AUTO: 7 [PH] (ref 5–8)
PROT UR QL STRIP: NORMAL MG/DL
RBC UR QL AUTO: NORMAL
SP GR UR STRIP.AUTO: 1.02
UROBILINOGEN UR STRIP-MCNC: 1 MG/DL

## 2023-12-28 PROCEDURE — 87086 URINE CULTURE/COLONY COUNT: CPT

## 2023-12-28 PROCEDURE — 3078F DIAST BP <80 MM HG: CPT | Performed by: FAMILY MEDICINE

## 2023-12-28 PROCEDURE — 81002 URINALYSIS NONAUTO W/O SCOPE: CPT | Performed by: FAMILY MEDICINE

## 2023-12-28 PROCEDURE — 99214 OFFICE O/P EST MOD 30 MIN: CPT | Performed by: FAMILY MEDICINE

## 2023-12-28 PROCEDURE — 3074F SYST BP LT 130 MM HG: CPT | Performed by: FAMILY MEDICINE

## 2023-12-28 RX ORDER — SULFAMETHOXAZOLE AND TRIMETHOPRIM 800; 160 MG/1; MG/1
1 TABLET ORAL 2 TIMES DAILY
Qty: 14 TABLET | Refills: 0 | Status: SHIPPED | OUTPATIENT
Start: 2023-12-28 | End: 2024-02-13

## 2023-12-28 ASSESSMENT — FIBROSIS 4 INDEX: FIB4 SCORE: 0.54

## 2023-12-29 DIAGNOSIS — N39.0 URINARY TRACT INFECTION WITHOUT HEMATURIA, SITE UNSPECIFIED: ICD-10-CM

## 2023-12-29 NOTE — PROGRESS NOTES
"Chief Complaint:   Chief Complaint   Patient presents with    Dysuria       HPI:Established , New to me   Bella Garibay is a 53 y.o. female who presents for evaluation of the following today:    Urinary tract infection without hematuria, site unspecified  Ongoing problem, new to me  Patient states she has been having symptoms of UTI for the past 10 days, she is having urgency and burning sensation with urination.  Increased frequency.  She had on and off fever in the past before starting antibiotics that she was prescribed recently by urgent care  Seen urgent care, records reviewed.  Treated with Macrobid, no urine culture available    Patient said she completed a course of 5 days of Macrobid but she does not see any improvement in her symptoms.  Denies fever or nausea or vomiting or flank pain at this time      Past medical history, family history, social history and medications reviewed and updated in the record. today  Current medications, problem list and allergies reviewed in [facility] today   Health maintenance topics are reviewed and updated.    Patient Active Problem List    Diagnosis Date Noted    Chronic prescription benzodiazepine use 06/08/2023    Generalized headaches 03/01/2022    Granuloma annulare 08/01/2019    Hypopituitarism (HCC) - \"panhypopituitarism\" 03/14/2019    Tinea versicolor 08/31/2018    Other constipation 08/31/2018    Controlled type 2 diabetes mellitus without complication ([facility]) 08/24/2017    Chronic bilateral low back pain with right-sided sciatica 08/24/2017    BMI 45.0-49.9, adult (HCC) 08/15/2017    Right knee pain 06/16/2016    Hypothyroidism due to acquired atrophy of thyroid 11/06/2015    Insomnia 03/20/2014    CAROLA (generalized anxiety disorder) 09/06/2011    Obesity 07/24/2009     Family History   Problem Relation Age of Onset    Alcohol/Drug Mother         accidental overdose    Cancer Mother         breast    Cancer Maternal Uncle         liver    Cancer Maternal Aunt     "     breast    Cancer Maternal Grandmother         breast     Social History     Socioeconomic History    Marital status:      Spouse name: Not on file    Number of children: Not on file    Years of education: Not on file    Highest education level: Not on file   Occupational History    Not on file   Tobacco Use    Smoking status: Some Days     Current packs/day: 0.25     Types: Cigarettes    Smokeless tobacco: Never    Tobacco comments:     reports 4-5 cig pre day   Vaping Use    Vaping Use: Never used   Substance and Sexual Activity    Alcohol use: Not Currently    Drug use: Not Currently    Sexual activity: Yes     Partners: Male     Comment: , twins,    Other Topics Concern    Not on file   Social History Narrative    Not on file     Social Determinants of Health     Financial Resource Strain: Not on file   Food Insecurity: Not on file   Transportation Needs: Not on file   Physical Activity: Not on file   Stress: Not on file   Social Connections: Not on file   Intimate Partner Violence: Not on file   Housing Stability: Not on file       Current Outpatient Medications   Medication Sig Dispense Refill    sulfamethoxazole-trimethoprim (BACTRIM DS) 800-160 MG tablet Take 1 Tablet by mouth 2 times a day. 14 Tablet 0    MOUNJARO 2.5 MG/0.5ML Solution Pen-injector INJECT UNDER THE SKIN ONCE A WEEK      phentermine 15 MG capsule TAKE 1 ORAL CAPSULE 2 TIMES A DAY E88.9      omeprazole (PRILOSEC) 20 MG delayed-release capsule Take 1 Capsule by mouth every day. In the morning before breakfast 90 Capsule 3    buPROPion (WELLBUTRIN XL) 150 MG XL tablet TAKE 1 TABLET BY MOUTH IN THE  MORNING 90 Tablet 3    amitriptyline (ELAVIL) 150 MG Tab TAKE 1 TABLET BY MOUTH AT  BEDTIME AS NEEDED FOR SLEEP 90 Tablet 3    gabapentin (NEURONTIN) 600 MG tablet Take 1 Tablet by mouth 3 times a day. 90 Tablet 1    levothyroxine (SYNTHROID) 175 MCG Tab Take 1 Tablet by mouth every morning on an empty stomach. 90 Tablet 3     cyanocobalamin (VITAMIN B-12) 1000 MCG/ML Solution INJECT INTRAMUSCULARLY 1 ML AS  DIRECTED EVERY 7 DAYS (DISCARD  28 DAYS AFTER FIRST USE) 8 mL 3    ketoconazole (NIZORAL) 2 % Cream APPLY TO AFFECTED AREA TOPICALLY EVERY DAY (Patient not taking: Reported on 12/22/2023) 60 g 1    Empagliflozin (JARDIANCE) 25 MG Tab Take 1 Tablet by mouth every day. 90 Tablet 3    cyclobenzaprine (FLEXERIL) 10 mg Tab Take 1 Tablet by mouth 2 times a day as needed. 180 Tablet 1    ibuprofen (MOTRIN) 800 MG Tab Take 1 Tablet by mouth every 8 hours as needed for Mild Pain. 90 Tablet 3    GENOTROPIN 1 MG Recon Soln       Somatropin 0.2 MG Recon Soln Inject 4 mg as instructed every day. Non-functioning pituitary gland S/P TBI (Patient not taking: Reported on 12/22/2023) 116 Each      No current facility-administered medications for this visit.         Review Of Systems  As documented in HPI above  PHYSICAL EXAMINATION:    ASSESSMENT/Plan:      1. Urinary tract infection without hematuria, site unspecified  Urine dip at the office with positive nitrate, will start patient on Bactrim DS for 7 days, will wait for urine culture results to see if we need to change antibiotics.  Advised aggressive hydration and to use Pyridium for symptom control, follow-up as directed  - POCT Urinalysis  - URINE CULTURE(NEW); Future  - sulfamethoxazole-trimethoprim (BACTRIM DS) 800-160 MG tablet; Take 1 Tablet by mouth 2 times a day.  Dispense: 14 Tablet; Refill: 0      Please note that this dictation was created using voice recognition software. I have made every reasonable attempt to correct obvious errors but there may be errors of grammar and content that I may have overlooked prior to finalization of this note.

## 2024-01-01 LAB
BACTERIA UR CULT: NORMAL
SIGNIFICANT IND 70042: NORMAL
SITE SITE: NORMAL
SOURCE SOURCE: NORMAL

## 2024-01-05 ENCOUNTER — APPOINTMENT (OUTPATIENT)
Dept: BEHAVIORAL HEALTH | Facility: CLINIC | Age: 54
End: 2024-01-05
Payer: COMMERCIAL

## 2024-01-10 ENCOUNTER — HOSPITAL ENCOUNTER (OUTPATIENT)
Facility: MEDICAL CENTER | Age: 54
End: 2024-01-10
Attending: NURSE PRACTITIONER
Payer: COMMERCIAL

## 2024-01-10 ENCOUNTER — OFFICE VISIT (OUTPATIENT)
Dept: MEDICAL GROUP | Facility: MEDICAL CENTER | Age: 54
End: 2024-01-10
Payer: COMMERCIAL

## 2024-01-10 VITALS
HEART RATE: 65 BPM | DIASTOLIC BLOOD PRESSURE: 70 MMHG | HEIGHT: 67 IN | BODY MASS INDEX: 39.71 KG/M2 | TEMPERATURE: 97.5 F | SYSTOLIC BLOOD PRESSURE: 101 MMHG | OXYGEN SATURATION: 94 % | WEIGHT: 253 LBS

## 2024-01-10 DIAGNOSIS — N30.00 ACUTE CYSTITIS WITHOUT HEMATURIA: ICD-10-CM

## 2024-01-10 DIAGNOSIS — N39.0 RECURRENT UTI: ICD-10-CM

## 2024-01-10 LAB
APPEARANCE UR: CLEAR
BILIRUB UR STRIP-MCNC: NEGATIVE MG/DL
COLOR UR AUTO: YELLOW
GLUCOSE UR STRIP.AUTO-MCNC: NORMAL MG/DL
KETONES UR STRIP.AUTO-MCNC: NORMAL MG/DL
LEUKOCYTE ESTERASE UR QL STRIP.AUTO: NEGATIVE
NITRITE UR QL STRIP.AUTO: NORMAL
PH UR STRIP.AUTO: 7.5 [PH] (ref 5–8)
PROT UR QL STRIP: NEGATIVE MG/DL
RBC UR QL AUTO: NEGATIVE
SP GR UR STRIP.AUTO: NEGATIVE
UROBILINOGEN UR STRIP-MCNC: NORMAL MG/DL

## 2024-01-10 PROCEDURE — 87086 URINE CULTURE/COLONY COUNT: CPT

## 2024-01-10 PROCEDURE — 81002 URINALYSIS NONAUTO W/O SCOPE: CPT | Performed by: NURSE PRACTITIONER

## 2024-01-10 PROCEDURE — 3078F DIAST BP <80 MM HG: CPT | Performed by: NURSE PRACTITIONER

## 2024-01-10 PROCEDURE — 3074F SYST BP LT 130 MM HG: CPT | Performed by: NURSE PRACTITIONER

## 2024-01-10 PROCEDURE — 99214 OFFICE O/P EST MOD 30 MIN: CPT | Performed by: NURSE PRACTITIONER

## 2024-01-10 RX ORDER — NITROFURANTOIN 25; 75 MG/1; MG/1
100 CAPSULE ORAL 2 TIMES DAILY
Qty: 20 CAPSULE | Refills: 0 | Status: SHIPPED | OUTPATIENT
Start: 2024-01-10 | End: 2024-02-13

## 2024-01-10 ASSESSMENT — FIBROSIS 4 INDEX: FIB4 SCORE: 0.54

## 2024-01-11 NOTE — PROGRESS NOTES
"  Subjective:     Bella Garibay is a 53 y.o. female who presents with recurrent UTI.    HPI:   Seen in f/u for recurrent UTI.   She has hypopituitarism.  She is not on tx currently.  She has new insurance and they do not recognize adult hypopituitarism. She is going to Dr Urbina for tx.   However d/t her underlying disease she heals slower.  She has had 3 recent UTI's.  The initial UA was not cx but she wa sgiven nitrofurantoin x 5 days.  Then she had reoccurring sx.  Was reseen and cx was neg.  She was given 1 wk bactrim.  That was on 12/28/23.    Now sx have never completely resolved.  She is having urinary freq.  + late voiding dysuria.  Also ahving dee lower abd discomfort.      Patient Active Problem List    Diagnosis Date Noted    Chronic prescription benzodiazepine use 06/08/2023    Generalized headaches 03/01/2022    Granuloma annulare 08/01/2019    Hypopituitarism (HCC) - \"panhypopituitarism\" 03/14/2019    Tinea versicolor 08/31/2018    Other constipation 08/31/2018    Controlled type 2 diabetes mellitus without complication ([facility]) 08/24/2017    Chronic bilateral low back pain with right-sided sciatica 08/24/2017    BMI 45.0-49.9, adult (HCC) 08/15/2017    Right knee pain 06/16/2016    Hypothyroidism due to acquired atrophy of thyroid 11/06/2015    Insomnia 03/20/2014    CAROLA (generalized anxiety disorder) 09/06/2011    Obesity 07/24/2009       Current medicines (including changes today)  Current Outpatient Medications   Medication Sig Dispense Refill    nitrofurantoin (MACROBID) 100 MG Cap Take 1 Capsule by mouth 2 times a day. 20 Capsule 0    sulfamethoxazole-trimethoprim (BACTRIM DS) 800-160 MG tablet Take 1 Tablet by mouth 2 times a day. 14 Tablet 0    buPROPion (WELLBUTRIN XL) 150 MG XL tablet TAKE 1 TABLET BY MOUTH IN THE  MORNING 90 Tablet 3    amitriptyline (ELAVIL) 150 MG Tab TAKE 1 TABLET BY MOUTH AT  BEDTIME AS NEEDED FOR SLEEP 90 Tablet 3    gabapentin (NEURONTIN) 600 MG tablet Take 1 " Tablet by mouth 3 times a day. 90 Tablet 1    levothyroxine (SYNTHROID) 175 MCG Tab Take 1 Tablet by mouth every morning on an empty stomach. 90 Tablet 3    cyanocobalamin (VITAMIN B-12) 1000 MCG/ML Solution INJECT INTRAMUSCULARLY 1 ML AS  DIRECTED EVERY 7 DAYS (DISCARD  28 DAYS AFTER FIRST USE) 8 mL 3    ketoconazole (NIZORAL) 2 % Cream APPLY TO AFFECTED AREA TOPICALLY EVERY DAY (Patient not taking: Reported on 12/22/2023) 60 g 1    MOUNJARO 2.5 MG/0.5ML Solution Pen-injector INJECT UNDER THE SKIN ONCE A WEEK      phentermine 15 MG capsule TAKE 1 ORAL CAPSULE 2 TIMES A DAY E88.9      Empagliflozin (JARDIANCE) 25 MG Tab Take 1 Tablet by mouth every day. 90 Tablet 3    cyclobenzaprine (FLEXERIL) 10 mg Tab Take 1 Tablet by mouth 2 times a day as needed. 180 Tablet 1    ibuprofen (MOTRIN) 800 MG Tab Take 1 Tablet by mouth every 8 hours as needed for Mild Pain. 90 Tablet 3    omeprazole (PRILOSEC) 20 MG delayed-release capsule Take 1 Capsule by mouth every day. In the morning before breakfast 90 Capsule 3    GENOTROPIN 1 MG Recon Soln       Somatropin 0.2 MG Recon Soln Inject 4 mg as instructed every day. Non-functioning pituitary gland S/P TBI (Patient not taking: Reported on 12/22/2023) 116 Each      No current facility-administered medications for this visit.       No Known Allergies    ROS  Constitutional: Negative. Negative for fever, chills, weight loss, malaise/fatigue and diaphoresis.   HENT: Negative. Negative for hearing loss, ear pain, nosebleeds, congestion, sore throat, neck pain, tinnitus and ear discharge.   Respiratory: Negative. Negative for cough, hemoptysis, sputum production, shortness of breath, wheezing and stridor.   Cardiovascular: Negative. Negative for chest pain, palpitations, orthopnea, claudication, leg swelling and PND.   Gastrointestinal: Denies nausea, vomiting, diarrhea, constipation, heartburn, melena or hematochezia.  Genitourinary: Denies hematuria, urinary incontinence, urgency.  "       Objective:     /70 (BP Location: Right arm, Patient Position: Sitting, BP Cuff Size: Adult)   Pulse 65   Temp 36.4 °C (97.5 °F) (Temporal)   Ht 1.7 m (5' 6.93\")   Wt 115 kg (253 lb)   SpO2 94%  Body mass index is 39.71 kg/m².    Physical Exam:  Physical Exam   Vitals reviewed.  Constitutional: oriented to person, place, and time. appears well-developed and well-nourished. No distress.   Cardiovascular: Normal rate, regular rhythm, normal heart sounds and intact distal pulses.  Exam reveals no gallop and no friction rub.  No murmur heard.  No carotid bruits.   Pulmonary/Chest: Effort normal and breath sounds normal. No stridor. No respiratory distress. no wheezes or rales. exhibits no tenderness.   Musculoskeletal: Normal range of motion. exhibits no edema. dee pedal pulses 2+.  Lymphadenopathy: no cervical or supraclavicular adenopathy.   Abd:  No CVAT,  Soft,  Bs noted in all quadrants.  No HSM.  No abdominal tenderness.  Neurological: alert and oriented to person, place, and time. exhibits normal muscle tone. Coordination normal.   Skin: Skin is warm and dry. no diaphoresis.   Psychiatric: normal mood and affect. behavior is normal.   UA in office + nitrites.  Will send for cx     Assessment and Plan:     The following treatment plan was discussed:    1. Acute cystitis without hematuria  nitrofurantoin (MACROBID) 100 MG Cap    URINE CULTURE(NEW)    Referral to Urology    recurrent UTI. nitrofurantoin x 10 days. do UA/CX. f/u w/pt w/results. refer urology d/t repeat UTI      2. Recurrent UTI  URINE CULTURE(NEW)    Referral to Urology            Followup: Return if symptoms worsen or fail to improve.  "

## 2024-01-13 LAB
BACTERIA UR CULT: NORMAL
SIGNIFICANT IND 70042: NORMAL
SITE SITE: NORMAL
SOURCE SOURCE: NORMAL

## 2024-01-23 ENCOUNTER — APPOINTMENT (OUTPATIENT)
Dept: BEHAVIORAL HEALTH | Facility: CLINIC | Age: 54
End: 2024-01-23
Payer: COMMERCIAL

## 2024-01-23 NOTE — PROGRESS NOTES
This evaluation was conducted via {platform:30387} using secure and encrypted videoconferencing technology. {Virtual or Telemedicine:53870}      PSYCHIATRY FOLLOW-UP NOTE      Name: Bella Garibay  MRN: [id number]  : 1970  Age: 53 y.o.  Date of assessment: 2024  PCP: ERIKA Kumari  Persons in attendance: Patient      REASON FOR VISIT/CHIEF COMPLAINT (as stated by Patient):  Bella Garibay is a 53 y.o., White female, attending follow-up appointment for mood and anxiety management.      HISTORY OF PRESENT ILLNESS:  Bella Garibay is a 53 y.o. old female with CAROLA and insomnia with chronic benzodiazepine use comes in today for follow up. Patient was last seen 5 months ago, and following treatment planning recommendations were done:  Increase Zoloft 100 mg daily X 3 weeks --> if no improvement seen, increase to 150 mg daily for mood and anxiety.  Continue Gabapentin 600 mg TID for anxiety and panic attacks.  Continue Xanax taper under Zoloft & Gabapentin cover to prevent withdrawal management.  Continue Wellbutrin  mg daily for depression. Patient not interested in stopping at this time.  Continue Amitriptyline 150 mg HS for sleep.       PSYCHOTHERAPY ASPECT OF SESSION (*** MIN):  ***      CURRENT MEDICATIONS:  Current Outpatient Medications   Medication Sig Dispense Refill    nitrofurantoin (MACROBID) 100 MG Cap Take 1 Capsule by mouth 2 times a day. 20 Capsule 0    sulfamethoxazole-trimethoprim (BACTRIM DS) 800-160 MG tablet Take 1 Tablet by mouth 2 times a day. 14 Tablet 0    buPROPion (WELLBUTRIN XL) 150 MG XL tablet TAKE 1 TABLET BY MOUTH IN THE  MORNING 90 Tablet 3    amitriptyline (ELAVIL) 150 MG Tab TAKE 1 TABLET BY MOUTH AT  BEDTIME AS NEEDED FOR SLEEP 90 Tablet 3    gabapentin (NEURONTIN) 600 MG tablet Take 1 Tablet by mouth 3 times a day. 90 Tablet 1    levothyroxine (SYNTHROID) 175 MCG Tab Take 1 Tablet by mouth every morning on an empty stomach. 90 Tablet  "3    cyanocobalamin (VITAMIN B-12) 1000 MCG/ML Solution INJECT INTRAMUSCULARLY 1 ML AS  DIRECTED EVERY 7 DAYS (DISCARD  28 DAYS AFTER FIRST USE) 8 mL 3    ketoconazole (NIZORAL) 2 % Cream APPLY TO AFFECTED AREA TOPICALLY EVERY DAY (Patient not taking: Reported on 12/22/2023) 60 g 1    MOUNJARO 2.5 MG/0.5ML Solution Pen-injector INJECT UNDER THE SKIN ONCE A WEEK      phentermine 15 MG capsule TAKE 1 ORAL CAPSULE 2 TIMES A DAY E88.9      Empagliflozin (JARDIANCE) 25 MG Tab Take 1 Tablet by mouth every day. 90 Tablet 3    cyclobenzaprine (FLEXERIL) 10 mg Tab Take 1 Tablet by mouth 2 times a day as needed. 180 Tablet 1    ibuprofen (MOTRIN) 800 MG Tab Take 1 Tablet by mouth every 8 hours as needed for Mild Pain. 90 Tablet 3    omeprazole (PRILOSEC) 20 MG delayed-release capsule Take 1 Capsule by mouth every day. In the morning before breakfast 90 Capsule 3    GENOTROPIN 1 MG Recon Soln       Somatropin 0.2 MG Recon Soln Inject 4 mg as instructed every day. Non-functioning pituitary gland S/P TBI (Patient not taking: Reported on 12/22/2023) 116 Each      No current facility-administered medications for this visit.       MEDICAL HISTORY  Past Medical History:   Diagnosis Date    Anesthesia     \"hard to sedate\"    Arthritis     Osteo to lower back    Bowel habit changes     Bronchitis 6/17/2014    Dental disorder     All dental implants-upper permanent and lower snap in.    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    Hypothyroid 7/24/2009    Obesity 7/24/2009    Panhypopituitarism (HCC)     Psychiatric problem     anxiety and depression S/P TBI and wt gain.      Past Surgical History:   Procedure Laterality Date    [place] COLONOSCOPY,DIAGNOSTIC N/A 2/25/2021    Procedure: COLONOSCOPY;  Surgeon: Jem Rick M.D.;  " Location: SURGERY SAME DAY [facility] [place];  Service: Gastroenterology    COLONOSCOPY WITH POLYP N/A 2021    Procedure: COLONOSCOPY, WITH POLYPECTOMY;  Surgeon: Jem Rick M.D.;  Location: SURGERY SAME DAY [facility] [place];  Service: Gastroenterology    DENTAL RESTORATION  10/2017    dental implants to all teeth    PRIMARY C SECTION      DENTAL EXTRACTION(S)      wisdom teeth       PAST PSYCHIATRIC MEDICATIONS  Lexapro, Zoloft  Wellbutrin  Amitriptyline      Xanax, Gabapentin        REVIEW OF SYSTEMS:        Constitutional  Obesity   Eyes negative   Ears/Nose/Mouth/Throat negative   Cardiovascular negative   Respiratory negative   Gastrointestinal negative   Genitourinary negative   Muscular negative   Integumentary negative   Neurological negative   Endocrine  Hypothyroidism; DM; Hypopituitarism   Hematologic/Lymphatic negative     PHYSICAL EXAMINAION:  Vital signs: LMP  (LMP Unknown)   Musculoskeletal: Normal gait.   Abnormal movements: ***      MENTAL STATUS EXAMINATION      General:   - Grooming and hygiene: {[facility] GROOMIN},   - Apparent distress: ***,   - Behavior: {[facility] BEHAVIOR:69535486}  - Eye Contact:  {[facility] EYE CONTACT:08993008},   - no psychomotor agitation or retardation ***   - Participation: {[facility] PARTICIPATION MEASURES:92932972}  Orientation: {[facility] :08405419} to person, place and time  Mood: {[facility] MOOD:79446361}  Affect: {[facility] AFFECT:44961131},  Thought Process: {[facility] THOUGHT PROCESS:99394526}  Thought Content: Denies suicidal or homicidal ideations, intent or plan {[facility] THOUGHT CONTENT:11704231}  Perception: Denies auditory or visual hallucinations. No delusions noted {[facility] PERCEPTION:69034110}  Attention span and concentration: Intact ***  Speech:{[facility] SPEECH:67272201}  Language: Appropriate ***  Insight: {GOOD/ADEQUATE/LIMITED/POOR:21012435}  Judgment: {GOOD/ADEQUATE/LIMITED/POOR:43213039}  Recent and remote memory: {[facility] MEMORY:42031933}        DEPRESSION SCREENING:       1/13/2022     1:00 PM 3/8/2023     7:20 AM 6/8/2023     2:00 PM   Depression Screen (PHQ-2/PHQ-9)   PHQ-2 Total Score 0 0 2   PHQ-9 Total Score   12       Interpretation of PHQ-9 Total Score   Score Severity   1-4 No Depression   5-9 Mild Depression   10-14 Moderate Depression   15-19 Moderately Severe Depression   20-27 Severe Depression    CURRENT RISK:       Suicidal: {[facility] RATINGS:17666519}       Homicidal: {[facility] RATINGS:33628906}       Self-Harm: {[facility] RATINGS:83191034}       Relapse: {[facility] RATINGS:22883576}       Crisis Safety Plan Reviewed {YES/NO/NOT INDICATED:12052}       If evidence of imminent risk is present, intervention/plan:      MEDICAL RECORDS/LABS/DIAGNOSTIC TESTS REVIEWED:  No new lab since last visit     [facility] records -   Reviewed     PLAN:  (1) CAROLA; (2) Chronic prescription benzodiazepine use  Slow improvement  Increase Zoloft 100 mg daily X 3 weeks --> if no improvement seen, increase to 150 mg daily for mood and anxiety.  Continue Gabapentin 600 mg TID for anxiety and panic attacks.  Continue Xanax taper under Zoloft & Gabapentin cover to prevent withdrawal management.  Continue Wellbutrin  mg daily for depression. Patient not interested in stopping at this time.  Continue Amitriptyline 150 mg HS for sleep.   Medication options, alternatives (including no medications) and medication risks/benefits/side effects were discussed in detail.  Explained importance of contraceptive measures while on psychotropic medications, educated to let provider know if ever pregnant or wanting to become pregnant. Verbalized understanding.  The patient was advised to call, message provider on [facility], or come in to the clinic if symptoms worsen or if any future questions/issues regarding their medications arise; the patient verbalized understanding and agreement.    The patient was educated to call 911, call the suicide hotline, or go to local ER if having thoughts of suicide or homicide; verbalized  understanding.    Billing Coding based on:  54319 based on [facility]  41787: based on psychotherapy timing  16030: based on total time spent of 40 min in evaluation, charting and file review.     Return to clinic in *** or sooner if symptoms worsen.  Next Appointment: instruction provided on how to make the next appointment.     The proposed treatment plan was discussed with the patient who was provided the opportunity to ask questions and make suggestions regarding alternative treatment. Patient verbalized understanding and expressed agreement with the plan.       Pablo Hayden M.D.  01/23/24    This note was created using voice recognition software (Dragon). The accuracy of the dictation is limited by the abilities of the software. I have reviewed the note prior to signing, however some errors in grammar and context are still possible. If you have any questions related to this note please do not hesitate to contact our office.

## 2024-01-24 ENCOUNTER — APPOINTMENT (OUTPATIENT)
Dept: BEHAVIORAL HEALTH | Facility: CLINIC | Age: 54
End: 2024-01-24
Payer: COMMERCIAL

## 2024-01-24 ENCOUNTER — PATIENT MESSAGE (OUTPATIENT)
Dept: MEDICAL GROUP | Facility: LAB | Age: 54
End: 2024-01-24
Payer: COMMERCIAL

## 2024-01-24 DIAGNOSIS — Z00.00 PREVENTATIVE HEALTH CARE: ICD-10-CM

## 2024-01-24 DIAGNOSIS — E55.9 VITAMIN D DEFICIENCY: ICD-10-CM

## 2024-01-31 ENCOUNTER — OFFICE VISIT (OUTPATIENT)
Dept: BEHAVIORAL HEALTH | Facility: CLINIC | Age: 54
End: 2024-01-31
Payer: COMMERCIAL

## 2024-01-31 ENCOUNTER — HOSPITAL ENCOUNTER (OUTPATIENT)
Dept: RADIOLOGY | Facility: MEDICAL CENTER | Age: 54
End: 2024-01-31
Attending: NURSE PRACTITIONER
Payer: COMMERCIAL

## 2024-01-31 ENCOUNTER — HOSPITAL ENCOUNTER (OUTPATIENT)
Dept: LAB | Facility: MEDICAL CENTER | Age: 54
End: 2024-01-31
Attending: NURSE PRACTITIONER
Payer: COMMERCIAL

## 2024-01-31 DIAGNOSIS — Z12.31 VISIT FOR SCREENING MAMMOGRAM: ICD-10-CM

## 2024-01-31 DIAGNOSIS — Z00.00 PREVENTATIVE HEALTH CARE: ICD-10-CM

## 2024-01-31 DIAGNOSIS — F41.0 PANIC ATTACKS: ICD-10-CM

## 2024-01-31 DIAGNOSIS — Z79.899 CHRONIC PRESCRIPTION BENZODIAZEPINE USE: ICD-10-CM

## 2024-01-31 DIAGNOSIS — E55.9 VITAMIN D DEFICIENCY: ICD-10-CM

## 2024-01-31 DIAGNOSIS — F41.1 GAD (GENERALIZED ANXIETY DISORDER): ICD-10-CM

## 2024-01-31 LAB
25(OH)D3 SERPL-MCNC: 30 NG/ML (ref 30–100)
ALBUMIN SERPL BCP-MCNC: 4.2 G/DL (ref 3.2–4.9)
ALBUMIN/GLOB SERPL: 1.2 G/DL
ALP SERPL-CCNC: 67 U/L (ref 30–99)
ALT SERPL-CCNC: 13 U/L (ref 2–50)
ANION GAP SERPL CALC-SCNC: 12 MMOL/L (ref 7–16)
AST SERPL-CCNC: 15 U/L (ref 12–45)
BASOPHILS # BLD AUTO: 0.9 % (ref 0–1.8)
BASOPHILS # BLD: 0.06 K/UL (ref 0–0.12)
BILIRUB SERPL-MCNC: 0.5 MG/DL (ref 0.1–1.5)
BUN SERPL-MCNC: 11 MG/DL (ref 8–22)
CALCIUM ALBUM COR SERPL-MCNC: 9.1 MG/DL (ref 8.5–10.5)
CALCIUM SERPL-MCNC: 9.3 MG/DL (ref 8.5–10.5)
CHLORIDE SERPL-SCNC: 103 MMOL/L (ref 96–112)
CHOLEST SERPL-MCNC: 206 MG/DL (ref 100–199)
CO2 SERPL-SCNC: 25 MMOL/L (ref 20–33)
CREAT SERPL-MCNC: 0.77 MG/DL (ref 0.5–1.4)
EOSINOPHIL # BLD AUTO: 0.18 K/UL (ref 0–0.51)
EOSINOPHIL NFR BLD: 2.7 % (ref 0–6.9)
ERYTHROCYTE [DISTWIDTH] IN BLOOD BY AUTOMATED COUNT: 49.1 FL (ref 35.9–50)
EST. AVERAGE GLUCOSE BLD GHB EST-MCNC: 97 MG/DL
GFR SERPLBLD CREATININE-BSD FMLA CKD-EPI: 91 ML/MIN/1.73 M 2
GLOBULIN SER CALC-MCNC: 3.4 G/DL (ref 1.9–3.5)
GLUCOSE SERPL-MCNC: 102 MG/DL (ref 65–99)
HBA1C MFR BLD: 5 % (ref 4–5.6)
HCT VFR BLD AUTO: 57.1 % (ref 37–47)
HDLC SERPL-MCNC: 55 MG/DL
HGB BLD-MCNC: 18.8 G/DL (ref 12–16)
IMM GRANULOCYTES # BLD AUTO: 0.02 K/UL (ref 0–0.11)
IMM GRANULOCYTES NFR BLD AUTO: 0.3 % (ref 0–0.9)
LDLC SERPL CALC-MCNC: 133 MG/DL
LYMPHOCYTES # BLD AUTO: 2.13 K/UL (ref 1–4.8)
LYMPHOCYTES NFR BLD: 32.4 % (ref 22–41)
MCH RBC QN AUTO: 32.5 PG (ref 27–33)
MCHC RBC AUTO-ENTMCNC: 32.9 G/DL (ref 32.2–35.5)
MCV RBC AUTO: 98.6 FL (ref 81.4–97.8)
MONOCYTES # BLD AUTO: 0.53 K/UL (ref 0–0.85)
MONOCYTES NFR BLD AUTO: 8.1 % (ref 0–13.4)
NEUTROPHILS # BLD AUTO: 3.66 K/UL (ref 1.82–7.42)
NEUTROPHILS NFR BLD: 55.6 % (ref 44–72)
NRBC # BLD AUTO: 0 K/UL
NRBC BLD-RTO: 0 /100 WBC (ref 0–0.2)
PLATELET # BLD AUTO: 274 K/UL (ref 164–446)
PMV BLD AUTO: 10.5 FL (ref 9–12.9)
POTASSIUM SERPL-SCNC: 4.7 MMOL/L (ref 3.6–5.5)
PROT SERPL-MCNC: 7.6 G/DL (ref 6–8.2)
RBC # BLD AUTO: 5.79 M/UL (ref 4.2–5.4)
SODIUM SERPL-SCNC: 140 MMOL/L (ref 135–145)
T4 FREE SERPL-MCNC: 1.82 NG/DL (ref 0.93–1.7)
TRIGL SERPL-MCNC: 90 MG/DL (ref 0–149)
TSH SERPL DL<=0.005 MIU/L-ACNC: 0.05 UIU/ML (ref 0.38–5.33)
WBC # BLD AUTO: 6.6 K/UL (ref 4.8–10.8)

## 2024-01-31 PROCEDURE — 85025 COMPLETE CBC W/AUTO DIFF WBC: CPT

## 2024-01-31 PROCEDURE — 80061 LIPID PANEL: CPT

## 2024-01-31 PROCEDURE — 80053 COMPREHEN METABOLIC PANEL: CPT

## 2024-01-31 PROCEDURE — 99214 OFFICE O/P EST MOD 30 MIN: CPT | Performed by: PSYCHIATRY & NEUROLOGY

## 2024-01-31 PROCEDURE — 82306 VITAMIN D 25 HYDROXY: CPT

## 2024-01-31 PROCEDURE — 83036 HEMOGLOBIN GLYCOSYLATED A1C: CPT

## 2024-01-31 PROCEDURE — 84439 ASSAY OF FREE THYROXINE: CPT

## 2024-01-31 PROCEDURE — 77067 SCR MAMMO BI INCL CAD: CPT

## 2024-01-31 PROCEDURE — 36415 COLL VENOUS BLD VENIPUNCTURE: CPT

## 2024-01-31 PROCEDURE — 84443 ASSAY THYROID STIM HORMONE: CPT

## 2024-01-31 RX ORDER — SERTRALINE HYDROCHLORIDE 100 MG/1
TABLET, FILM COATED ORAL
Qty: 32 TABLET | Refills: 0 | Status: SHIPPED | OUTPATIENT
Start: 2024-01-31 | End: 2024-02-20

## 2024-01-31 RX ORDER — ALPRAZOLAM 0.25 MG/1
0.75 TABLET ORAL DAILY
Qty: 90 TABLET | Refills: 1 | Status: SHIPPED | OUTPATIENT
Start: 2024-01-31 | End: 2024-03-01

## 2024-01-31 ASSESSMENT — PATIENT HEALTH QUESTIONNAIRE - PHQ9: CLINICAL INTERPRETATION OF PHQ2 SCORE: 0

## 2024-01-31 NOTE — PROGRESS NOTES
PSYCHIATRY FOLLOW-UP NOTE      Name: Bella Garibay  MRN: [id number]  : 1970  Age: 53 y.o.  Date of assessment: 2024  PCP: ERIKA Kumari  Persons in attendance: Patient      REASON FOR VISIT/CHIEF COMPLAINT (as stated by Patient):  Bella Garibay is a 53 y.o., White female, attending follow-up appointment for mood and anxiety management.      HISTORY OF PRESENT ILLNESS:  Bella Garibay is a 53 y.o. old female with CAROLA and chronic prescription benzodiazepine use comes in today for follow up. Patient was last seen 5 months ago, and following treatment planning recommendations were done:  Increase Zoloft 100 mg daily X 3 weeks --> if no improvement seen, increase to 150 mg daily for mood and anxiety.  Continue Gabapentin 600 mg TID for anxiety and panic attacks.  Continue Xanax taper under Zoloft & Gabapentin cover to prevent withdrawal management.  Continue Wellbutrin  mg daily for depression. Patient not interested in stopping at this time.  Continue Amitriptyline 150 mg HS for sleep.      Patient is seen after 5 months due to insurance issues.  The last dose of Xanax was filled in October and she is rationing and has reduced Xanax to 1 mg once a day.  Stopping this resulted in panic attack.  Patient ran out of Zoloft and gabapentin more than 1 month ago.  Not interested in going back on gabapentin due to sedation.  Agreed with restarting Zoloft with the goal of targeting anxiety and patient remained motivated to completely stop the Xanax.  Agreed with reducing Xanax from 1 mg once a day to 0.75 mg for next 6 weeks and then we will consider tapering it further to 0.5 mg every 6-8 weekly moving forward depending on her toleration and response.  Patient is also on phentermine and Wellbutrin prescribed by primary care physician.  Describes Wellbutrin as chronic and helpful for anxiety.  Patient understands the additive side effect of both these medication and  "anxiety.      CURRENT MEDICATIONS:  Current Outpatient Medications   Medication Sig Dispense Refill    nitrofurantoin (MACROBID) 100 MG Cap Take 1 Capsule by mouth 2 times a day. 20 Capsule 0    sulfamethoxazole-trimethoprim (BACTRIM DS) 800-160 MG tablet Take 1 Tablet by mouth 2 times a day. 14 Tablet 0    buPROPion (WELLBUTRIN XL) 150 MG XL tablet TAKE 1 TABLET BY MOUTH IN THE  MORNING 90 Tablet 3    amitriptyline (ELAVIL) 150 MG Tab TAKE 1 TABLET BY MOUTH AT  BEDTIME AS NEEDED FOR SLEEP 90 Tablet 3    gabapentin (NEURONTIN) 600 MG tablet Take 1 Tablet by mouth 3 times a day. 90 Tablet 1    levothyroxine (SYNTHROID) 175 MCG Tab Take 1 Tablet by mouth every morning on an empty stomach. 90 Tablet 3    cyanocobalamin (VITAMIN B-12) 1000 MCG/ML Solution INJECT INTRAMUSCULARLY 1 ML AS  DIRECTED EVERY 7 DAYS (DISCARD  28 DAYS AFTER FIRST USE) 8 mL 3    ketoconazole (NIZORAL) 2 % Cream APPLY TO AFFECTED AREA TOPICALLY EVERY DAY (Patient not taking: Reported on 12/22/2023) 60 g 1    MOUNJARO 2.5 MG/0.5ML Solution Pen-injector INJECT UNDER THE SKIN ONCE A WEEK      phentermine 15 MG capsule TAKE 1 ORAL CAPSULE 2 TIMES A DAY E88.9      Empagliflozin (JARDIANCE) 25 MG Tab Take 1 Tablet by mouth every day. 90 Tablet 3    cyclobenzaprine (FLEXERIL) 10 mg Tab Take 1 Tablet by mouth 2 times a day as needed. 180 Tablet 1    ibuprofen (MOTRIN) 800 MG Tab Take 1 Tablet by mouth every 8 hours as needed for Mild Pain. 90 Tablet 3    omeprazole (PRILOSEC) 20 MG delayed-release capsule Take 1 Capsule by mouth every day. In the morning before breakfast 90 Capsule 3    GENOTROPIN 1 MG Recon Soln       Somatropin 0.2 MG Recon Soln Inject 4 mg as instructed every day. Non-functioning pituitary gland S/P TBI (Patient not taking: Reported on 12/22/2023) 116 Each      No current facility-administered medications for this visit.       MEDICAL HISTORY  Past Medical History:   Diagnosis Date    Anesthesia     \"hard to sedate\"    Arthritis     " Osteo to lower back    Bowel habit changes     Bronchitis 6/17/2014    Dental disorder     All dental implants-upper permanent and lower snap in.    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    Hypothyroid 7/24/2009    Obesity 7/24/2009    Panhypopituitarism (HCC)     Psychiatric problem     anxiety and depression S/P TBI and wt gain.      Past Surgical History:   Procedure Laterality Date    [place] COLONOSCOPY,DIAGNOSTIC N/A 2/25/2021    Procedure: COLONOSCOPY;  Surgeon: Jem Rick M.D.;  Location: SURGERY SAME DAY [facility];  Service: Gastroenterology    COLONOSCOPY WITH POLYP N/A 2/25/2021    Procedure: COLONOSCOPY, WITH POLYPECTOMY;  Surgeon: Jem Rick M.D.;  Location: SURGERY SAME DAY [facility];  Service: Gastroenterology    DENTAL RESTORATION  10/2017    dental implants to all teeth    PRIMARY C SECTION  1995    DENTAL EXTRACTION(S)  1980's    wisdom teeth       PAST PSYCHIATRIC MEDICATIONS  Lexapro, Zoloft  Wellbutrin  Amitriptyline      Xanax, Gabapentin        REVIEW OF SYSTEMS:        Constitutional  Obesity   Eyes negative   Ears/Nose/Mouth/Throat negative   Cardiovascular negative   Respiratory negative   Gastrointestinal negative   Genitourinary negative   Muscular negative   Integumentary negative   Neurological negative   Endocrine  Hypothyroidism; DM; Hypopituitarism   Hematologic/Lymphatic negative     PHYSICAL EXAMINAION:  Vital signs: LMP  (LMP Unknown)   Musculoskeletal: Normal gait.   Abnormal movements: none      MENTAL STATUS EXAMINATION      General:   - Grooming and hygiene: Casual,   - Apparent distress: none,   - Behavior: Calm  - Eye Contact:  Good,   - no psychomotor agitation or retardation    - Participation: Active verbal participation  Orientation: Alert and Fully Oriented  to person, place and time  Mood: Anxious  Affect: Flexible,  Thought Process: Logical and Goal-directed  Thought Content: Denies suicidal or homicidal ideations, intent or plan   Perception: Denies auditory or visual hallucinations. No delusions noted   Attention span and concentration: Intact   Speech:Rate within normal limits and Volume within normal limits  Language: Appropriate   Insight: Good  Judgment: Good  Recent and remote memory: No gross evidence of memory deficits        DEPRESSION SCREENIN/13/2022     1:00 PM 3/8/2023     7:20 AM 2023     2:00 PM   Depression Screen (PHQ-2/PHQ-9)   PHQ-2 Total Score 0 0 2   PHQ-9 Total Score   12       Interpretation of PHQ-9 Total Score   Score Severity   1-4 No Depression   5-9 Mild Depression   10-14 Moderate Depression   15-19 Moderately Severe Depression   20-27 Severe Depression    CURRENT RISK:       Suicidal: Low       Homicidal: Low       Self-Harm: Low       Relapse: Low       Crisis Safety Plan Reviewed Not Indicated       If evidence of imminent risk is present, intervention/plan:      MEDICAL RECORDS/LABS/DIAGNOSTIC TESTS REVIEWED:  No new lab since last visit     [place]  records -   Reviewed     PLAN:  (1) CAROLA; (2) Chronic prescription benzodiazepine use  Slow improvement  Restart Zoloft 50 mg daily X 3 weeks --> 100 mg daily for mood and anxiety.  Reduce Xanax to 0.75 mg daily for anxiety and panic attacks.  Continue Wellbutrin  mg daily for depression. Patient not interested in stopping at this time.  Continue Amitriptyline 150 mg HS for sleep.   Medication options, alternatives (including no medications) and medication risks/benefits/side effects were discussed in detail.  Explained importance of contraceptive measures while on psychotropic medications, educated to let provider know if ever pregnant or wanting to become pregnant. Verbalized understanding.  The patient was advised to call, message provider on [employer], or come in to the  clinic if symptoms worsen or if any future questions/issues regarding their medications arise; the patient verbalized understanding and agreement.    The patient was educated to call 911, call the suicide hotline, or go to local ER if having thoughts of suicide or homicide; verbalized understanding.    Billing Coding based on:  59359 based on MDM    Return to clinic in 6 weeks or sooner if symptoms worsen.  Next Appointment: instruction provided on how to make the next appointment.     The proposed treatment plan was discussed with the patient who was provided the opportunity to ask questions and make suggestions regarding alternative treatment. Patient verbalized understanding and expressed agreement with the plan.       Pablo Hayden M.D.  01/31/24    This note was created using voice recognition software (Dragon). The accuracy of the dictation is limited by the abilities of the software. I have reviewed the note prior to signing, however some errors in grammar and context are still possible. If you have any questions related to this note please do not hesitate to contact our office.

## 2024-02-01 DIAGNOSIS — E03.4 HYPOTHYROIDISM DUE TO ACQUIRED ATROPHY OF THYROID: ICD-10-CM

## 2024-02-01 RX ORDER — LEVOTHYROXINE SODIUM 0.15 MG/1
150 TABLET ORAL
Qty: 30 TABLET | Refills: 2 | Status: SHIPPED | OUTPATIENT
Start: 2024-02-01 | End: 2024-02-13

## 2024-02-13 ENCOUNTER — OFFICE VISIT (OUTPATIENT)
Dept: MEDICAL GROUP | Facility: LAB | Age: 54
End: 2024-02-13
Payer: COMMERCIAL

## 2024-02-13 VITALS
DIASTOLIC BLOOD PRESSURE: 78 MMHG | BODY MASS INDEX: 39.87 KG/M2 | WEIGHT: 248.1 LBS | OXYGEN SATURATION: 95 % | RESPIRATION RATE: 16 BRPM | HEART RATE: 80 BPM | HEIGHT: 66 IN | SYSTOLIC BLOOD PRESSURE: 100 MMHG | TEMPERATURE: 96.7 F

## 2024-02-13 DIAGNOSIS — S30.0XXA CONTUSION OF SACRUM, INITIAL ENCOUNTER: ICD-10-CM

## 2024-02-13 DIAGNOSIS — E03.4 HYPOTHYROIDISM DUE TO ACQUIRED ATROPHY OF THYROID: ICD-10-CM

## 2024-02-13 DIAGNOSIS — R79.89 ABNORMAL CBC: ICD-10-CM
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
## 2024-02-13 DIAGNOSIS — Z79.891 USE OF OPIATES FOR THERAPEUTIC PURPOSES: ICD-10-CM

## 2024-02-13 PROCEDURE — 99214 OFFICE O/P EST MOD 30 MIN: CPT | Performed by: NURSE PRACTITIONER

## 2024-02-13 PROCEDURE — 3078F DIAST BP <80 MM HG: CPT | Performed by: NURSE PRACTITIONER

## 2024-02-13 PROCEDURE — 3074F SYST BP LT 130 MM HG: CPT | Performed by: NURSE PRACTITIONER

## 2024-02-13 RX ORDER — IBUPROFEN 800 MG/1
800 TABLET ORAL EVERY 8 HOURS PRN
Qty: 30 TABLET | Refills: 0 | Status: SHIPPED | OUTPATIENT
Start: 2024-02-13

## 2024-02-13 RX ORDER — LEVOTHYROXINE SODIUM 175 UG/1
CAPSULE ORAL
Qty: 30 CAPSULE | Refills: 0
Start: 2024-02-13

## 2024-02-13 RX ORDER — HYDROCODONE BITARTRATE AND ACETAMINOPHEN 5; 325 MG/1; MG/1
1 TABLET ORAL EVERY 4 HOURS PRN
Qty: 15 TABLET | Refills: 0 | Status: SHIPPED | OUTPATIENT
Start: 2024-02-13 | End: 2024-02-20

## 2024-02-13 RX ORDER — TIRZEPATIDE 5 MG/.5ML
5 INJECTION, SOLUTION SUBCUTANEOUS
Qty: 2 ML | Refills: 5
Start: 2024-02-13

## 2024-02-13 ASSESSMENT — FIBROSIS 4 INDEX: FIB4 SCORE: 0.82

## 2024-02-13 NOTE — PROGRESS NOTES
"Chief Complaint   Patient presents with    Fall     Sunday, fall from stairs, pain in Lower Back, Ankle twisted, L side pain, unable to sleep.       HPI:  Tammie is a 53-year-old established female here with complaint of tailbone pain.  Also sore to her left shoulder, low back and neck.  She fell down her stairs 2 days ago.  She does not feel that anything is broken.  She prefers not to have x-rays.  She is requesting pain medication for nighttime as she is having trouble sleeping because of her pain level.  She would also like a refill of prescription strength ibuprofen.      Generalized anxiety disorder: Chronic issue.  Improved.  Doing well with psychiatry, whom she sees monthly.  Her Xanax is down to 0.5 mg 3 times a day.    Type 2 diabetes: Chronic issue.  Followed by endocrinology, Dr. Urbina.  Also has panhypopituitarism and is prescribed somatotropin.  Utilizing Jardiance and Mounjaro for diabetes, last A1c was excellent at 5.0 and she has been able to lose 60 pounds    Hypothyroidism: Chronic issue.  Currently taking 175 mcg daily.  Last TSH/T4 showed that she was overmedicated with a TSH of 0.05 and a T4 of 1.8.  Denies heart palpitations or any symptoms of hyperthyroidism.  Via MyChart communication she decreased to 1/2 pill of 137 mcg every other day about 2 weeks ago      Exam:   /78 (BP Location: Right arm, Patient Position: Sitting, BP Cuff Size: Adult)   Pulse 80   Temp 35.9 °C (96.7 °F) (Temporal)   Resp 16   Ht 1.676 m (5' 6\")   Wt 113 kg (248 lb 1.6 oz)   LMP  (LMP Unknown)   SpO2 95%   BMI 40.04 kg/m²   Gen. appears healthy in no distress   Skin appropriate for sex and age   Neck trachea is midline  Lungs unlabored breathing  Heart regular rate  Neuro gait and station normal   Psych appropriate, calm, interactive, well-groomed  Musculoskeletal: She does have full range of motion of bilateral upper and lower extremities without any bony tenderness, joint deformity, overlying " erythema.  She has slight superficial bruising to her left tricep.  She appears a bit uncomfortable sitting on the exam table.  No other musculoskeletal abnormalities.    Assessment / Plan:  1. Hypothyroidism due to acquired atrophy of thyroid  -She would benefit from being on 150 mcg daily.  She will achieve a total week dosage of 150 mcg daily by decreasing her 175 mcg to 1/2 pill twice weekly, taking the whole pill of 175 mcg 5 days/week and rechecking labs in 4 to 6 weeks.    2. Contusion of sacrum, initial encounter  -Offered x-ray which she declines.  Discussed treating this as if her sacrum or coccyx bone are fractured with a donut type pillow at all times when seated for the next 4 to 6 weeks.  She requested a small prescription of hydrocodone for nighttime.  Reviewed  profile.  Reviewed side effects of hydrocodone.  Signed a controlled substance agreement.  She understands not to take Xanax within 4 hours of hydrocodone due to increased risk of overdose and death.  She will remain out of a car within 8 hours of taking hydrocodone.  She will email me if she changes her mind about x-ray.  - HYDROcodone-acetaminophen (NORCO) 5-325 MG Tab per tablet; Take 1 Tablet by mouth every four hours as needed (pain from fall) for up to 7 days.  Dispense: 15 Tablet; Refill: 0    4. Fall, initial encounter  -As above.  - HYDROcodone-acetaminophen (NORCO) 5-325 MG Tab per tablet; Take 1 Tablet by mouth every four hours as needed (pain from fall) for up to 7 days.  Dispense: 15 Tablet; Refill: 0    5. Use of opiates for therapeutic purposes  -In prescribing controlled substances to this patient, I certify that I have obtained and reviewed the medical history of Bella Garibay. I have also made a good luis armando effort to obtain applicable records from other providers who have treated the patient and records did not demonstrate any increased risk of substance abuse that would prevent me from prescribing controlled  substances.     I have conducted a physical exam and documented it. I have reviewed Ms. Garibay’s prescription history as maintained by the [place] Prescription Monitoring Program.     I have assessed the patient’s risk for abuse, dependency, and addiction using the validated Opioid Risk Tool available at https://www.mdcalc.com/wpuhnr-rdoe-mzdm-ort-narcotic-abuse.     Given the above, I believe the benefits of controlled substance therapy outweigh the risks. The reasons for prescribing controlled substances include non-narcotic, oral analgesic alternatives have been inadequate for pain control. Accordingly, I have discussed the risk and benefits, treatment plan, and alternative therapies with the patient.    - Controlled Substance Treatment Agreement    6. Abnormal CBC  -Uncertain etiology.  I am not the prescriber for her somatotropin but we discussed this could be the cause.  She will run this by her endocrinologist.  I encouraged her to go donate blood and recheck in 4 to 6 weeks  - CBC WITH DIFFERENTIAL; Future

## 2024-02-19 DIAGNOSIS — Z79.899 CHRONIC PRESCRIPTION BENZODIAZEPINE USE: ICD-10-CM

## 2024-02-19 DIAGNOSIS — F41.1 GAD (GENERALIZED ANXIETY DISORDER): ICD-10-CM

## 2024-02-19 DIAGNOSIS — F41.0 PANIC ATTACKS: ICD-10-CM

## 2024-02-20 RX ORDER — SERTRALINE HYDROCHLORIDE 100 MG/1
TABLET, FILM COATED ORAL
Qty: 32 TABLET | Refills: 0 | Status: SHIPPED | OUTPATIENT
Start: 2024-02-20 | End: 2024-03-13 | Stop reason: SDUPTHER

## 2024-02-28 ENCOUNTER — APPOINTMENT (OUTPATIENT)
Dept: MEDICAL GROUP | Facility: LAB | Age: 54
End: 2024-02-28
Payer: COMMERCIAL

## 2024-03-13 ENCOUNTER — TELEMEDICINE (OUTPATIENT)
Dept: BEHAVIORAL HEALTH | Facility: CLINIC | Age: 54
End: 2024-03-13
Payer: COMMERCIAL

## 2024-03-13 DIAGNOSIS — Z79.899 CHRONIC PRESCRIPTION BENZODIAZEPINE USE: ICD-10-CM

## 2024-03-13 DIAGNOSIS — F41.1 GAD (GENERALIZED ANXIETY DISORDER): ICD-10-CM

## 2024-03-13 DIAGNOSIS — F41.0 PANIC ATTACKS: ICD-10-CM

## 2024-03-13 PROCEDURE — 99214 OFFICE O/P EST MOD 30 MIN: CPT | Mod: 95 | Performed by: PSYCHIATRY & NEUROLOGY

## 2024-03-13 RX ORDER — ALPRAZOLAM 0.25 MG/1
0.75 TABLET ORAL NIGHTLY PRN
Qty: 90 TABLET | Refills: 1 | Status: SHIPPED | OUTPATIENT
Start: 2024-03-13 | End: 2024-04-12

## 2024-03-13 RX ORDER — SERTRALINE HYDROCHLORIDE 100 MG/1
100 TABLET, FILM COATED ORAL DAILY
Qty: 90 TABLET | Refills: 1 | Status: SHIPPED | OUTPATIENT
Start: 2024-03-13

## 2024-03-13 NOTE — PROGRESS NOTES
This evaluation was conducted via Zoom using secure and encrypted videoconferencing technology. The patient was in their home in the [facility].    The patient's identity was confirmed and verbal consent was obtained for this virtual visit.      PSYCHIATRY FOLLOW-UP NOTE      Name: Bella Garibay  MRN: [id number]  : 1970  Age: 53 y.o.  Date of assessment: 3/13/2024  PCP: ERIKA Kumari  Persons in attendance: Patient      REASON FOR VISIT/CHIEF COMPLAINT (as stated by Patient):  Bella Garibay is a 53 y.o., White female, attending follow-up appointment for mood and anxiety management.      HISTORY OF PRESENT ILLNESS:  Bella Garibay is a 53 y.o. old female with CAROLA and chronic prescription benzodiazepine use comes in today for follow up. Patient was last seen 6 weeks ago, and following treatment planning recommendations were done:  Restart Zoloft 50 mg daily X 3 weeks --> 100 mg daily for mood and anxiety.  Reduce Xanax to 0.75 mg daily for anxiety and panic attacks.  Continue Wellbutrin  mg daily for depression. Patient not interested in stopping at this time.  Continue Amitriptyline 150 mg HS for sleep.       Compliant with medications including Zoloft 100 mg (for last 2 weeks) and Xanax 0.75 mg.   Reports improved mood and anxiety with no withdrawal symptoms.   Occasional panic attacks but overall doing well.   Agreed with maintaining consistency for next 6-8 weeks and then considering further reduction in Xanax to 0.5 mg.  Patient has also reduced the caffeine intake from 10 cups to 2 cups now with good improvement for anxiety.  Educated on the impact of caffeine on worsening anxiety as.  Educated to practice deep breathing and in next session exercises on a daily basis and [facility]    Patient is also on phentermine and Wellbutrin prescribed by primary care physician.  Describes Wellbutrin as chronic and helpful for  "anxiety.  Patient understands the additive side effect of both these medication and anxiety.      CURRENT MEDICATIONS:  Current Outpatient Medications   Medication Sig Dispense Refill    sertraline (ZOLOFT) 100 MG Tab TAKE 1/2 TABLETS BY MOUTH EVERY DAY FOR 21 DAYS, THEN 1 TABLET EVERY DAY FOR 21 DAYS. 32 Tablet 0    ibuprofen (MOTRIN) 800 MG Tab Take 1 Tablet by mouth every 8 hours as needed for Mild Pain. With food. 30 Tablet 0    Tirzepatide (MOUNJARO) 5 MG/0.5ML Solution Pen-injector Inject 5 mg under the skin every 7 days. 2 mL 5    Levothyroxine Sodium 175 MCG Cap 175 mcg 5 d per week and 1/2 pill 2 d per week. 30 Capsule 0    buPROPion (WELLBUTRIN XL) 150 MG XL tablet TAKE 1 TABLET BY MOUTH IN THE  MORNING 90 Tablet 3    amitriptyline (ELAVIL) 150 MG Tab TAKE 1 TABLET BY MOUTH AT  BEDTIME AS NEEDED FOR SLEEP 90 Tablet 3    cyanocobalamin (VITAMIN B-12) 1000 MCG/ML Solution INJECT INTRAMUSCULARLY 1 ML AS  DIRECTED EVERY 7 DAYS (DISCARD  28 DAYS AFTER FIRST USE) 8 mL 3    phentermine 15 MG capsule TAKE 1 ORAL CAPSULE 2 TIMES A DAY E88.9      Empagliflozin (JARDIANCE) 25 MG Tab Take 1 Tablet by mouth every day. 90 Tablet 3    GENOTROPIN 1 MG Recon Soln        No current facility-administered medications for this visit.       MEDICAL HISTORY  Past Medical History:   Diagnosis Date    Anesthesia     \"hard to sedate\"    Arthritis     Osteo to lower back    Bowel habit changes     Bronchitis 6/17/2014    Dental disorder     All dental implants-upper permanent and lower snap in.    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    Hypothyroid 7/24/2009    Obesity 7/24/2009    Panhypopituitarism (HCC)     Psychiatric problem     anxiety and depression S/P TBI and wt gain.      Past Surgical History:   Procedure " Laterality Date    [place] COLONOSCOPY,DIAGNOSTIC N/A 2/25/2021    Procedure: COLONOSCOPY;  Surgeon: Jem Rick M.D.;  Location: SURGERY SAME DAY [facility];  Service: Gastroenterology    COLONOSCOPY WITH POLYP N/A 2/25/2021    Procedure: COLONOSCOPY, WITH POLYPECTOMY;  Surgeon: Jem Rick M.D.;  Location: SURGERY SAME DAY [facility];  Service: Gastroenterology    DENTAL RESTORATION  10/2017    dental implants to all teeth    PRIMARY C SECTION  1995    DENTAL EXTRACTION(S)  1980's    wisdom teeth       PAST PSYCHIATRIC MEDICATIONS  Lexapro, Zoloft  Wellbutrin  Amitriptyline      Xanax, Gabapentin        REVIEW OF SYSTEMS:        Constitutional  Obesity   Eyes negative   Ears/Nose/Mouth/Throat negative   Cardiovascular negative   Respiratory negative   Gastrointestinal negative   Genitourinary negative   Muscular negative   Integumentary negative   Neurological negative   Endocrine  Hypothyroidism; DM; Hypopituitarism   Hematologic/Lymphatic negative     PHYSICAL EXAMINAION:  Vital signs: LMP  (LMP Unknown)   Musculoskeletal: Normal gait.   Abnormal movements: none      MENTAL STATUS EXAMINATION      General:   - Grooming and hygiene: Casual,   - Apparent distress: none,   - Behavior: Calm  - Eye Contact:  Good,   - no psychomotor agitation or retardation    - Participation: Active verbal participation  Orientation: Alert and Fully Oriented to person, place and time  Mood: Anxious  Affect: Flexible,  Thought Process: Logical and Goal-directed  Thought Content: Denies suicidal or homicidal ideations, intent or plan   Perception: Denies auditory or visual hallucinations. No delusions noted   Attention span and concentration: Intact   Speech:Rate within normal limits and Volume within normal limits  Language: Appropriate   Insight: Good  Judgment: Good  Recent and remote memory: No gross evidence of memory deficits        DEPRESSION SCREENING:      3/8/2023     7:20 AM 6/8/2023     2:00 PM 1/31/2024     3:30 PM    Depression Screen (PHQ-2/PHQ-9)   PHQ-2 Total Score 0 2 0   PHQ-9 Total Score  12        Interpretation of PHQ-9 Total Score   Score Severity   1-4 No Depression   5-9 Mild Depression   10-14 Moderate Depression   15-19 Moderately Severe Depression   20-27 Severe Depression    CURRENT RISK:       Suicidal: Low       Homicidal: Low       Self-Harm: Low       Relapse: Low       Crisis Safety Plan Reviewed Not Indicated       If evidence of imminent risk is present, intervention/plan:      MEDICAL RECORDS/LABS/DIAGNOSTIC TESTS REVIEWED:  No new lab since last visit     [place]  records -   Reviewed     PLAN:  (1) CAROLA; (2) Chronic prescription benzodiazepine use  Slow improvement  Continue Zoloft 100 mg daily for mood and anxiety.  Continue Xanax 0.75 mg daily for anxiety and panic attacks.  Continue Wellbutrin  mg daily for depression. Patient not interested in stopping at this time.  Continue Amitriptyline 150 mg HS for sleep.   Medication options, alternatives (including no medications) and medication risks/benefits/side effects were discussed in detail.  Explained importance of contraceptive measures while on psychotropic medications, educated to let provider know if ever pregnant or wanting to become pregnant. Verbalized understanding.  The patient was advised to call, message provider on [employer], or come in to the clinic if symptoms worsen or if any future questions/issues regarding their medications arise; the patient verbalized understanding and agreement.    The patient was educated to call 911, call the suicide hotline, or go to local ER if having thoughts of suicide or homicide; verbalized understanding.    Billing Coding based on:  51330 based on MDM    Return to clinic in 6-8 weeks or sooner if symptoms worsen.  Next Appointment: instruction provided on how to make the next appointment.     The proposed treatment plan was discussed with the patient who was provided the opportunity to ask questions and  make suggestions regarding alternative treatment. Patient verbalized understanding and expressed agreement with the plan.       Pablo Hayden M.D.  03/13/24    This note was created using voice recognition software (Dragon). The accuracy of the dictation is limited by the abilities of the software. I have reviewed the note prior to signing, however some errors in grammar and context are still possible. If you have any questions related to this note please do not hesitate to contact our office.

## 2024-03-20 ENCOUNTER — APPOINTMENT (OUTPATIENT)
Dept: MEDICAL GROUP | Facility: LAB | Age: 54
End: 2024-03-20
Payer: COMMERCIAL

## 2024-03-28 NOTE — PROGRESS NOTES
"Chief Complaint   Patient presents with   • Pharyngitis     pt states she has sore throat, nasal congestion, cough, no fever, onset 6 days        HPI:  Tammie is a 47-year-old established female seen today with a 5-day history of stuffy nose and cough with green colored mucus. She is getting worse on a daily basis. She denies chest pain, palpitations, shortness of breath, fever, chills, nausea, vomiting, rash or other associated symptoms. She has tried \"cold severe symptoms.\"   Quit smoking 4 mo ago. Her  and family members have not been sick.    Anxiety: Chronic issue. Reports that her symptoms are controlled with Xanax 1 mg 3 times daily in addition to her amitriptyline that she's been on for years. Requesting a refill of her Xanax. Has tried daily SSRI therapy with what she calls too many side effects.    Morbid obesity: She is working on this through eliminating white carbohydrates. She is going to start seeing Dr. Ganser for potential discussion of a gastric sleeve. She needs a referral for 13 classes through the [facility] prior to being able to having a gastric sleeve done.     PMH,FH and medications reviewed and updated in the record.     Review of Systems   Gastrointestinal: Negative for nausea and vomiting.   Musculoskeletal: Negative for myalgias.   Skin: Negative for rash.   Neurological: Negative.  Negative for weakness and headaches.     Exam:  Blood pressure 107/70, pulse 92, temperature 36.9 °C (98.4 °F), resp. rate 12, height 1.676 m (5' 5.98\"), weight 124.286 kg (274 lb), SpO2 94 %.  Constitutional: Obese female, Alert, no distress, plus 3 vital signs  Skin:  Warm, dry, no rashes invisible areas  Eye: Equal, round and reactive, conjunctiva clear  ENMT: Bilateral tympanic membranes are retracted but translucent without erythema or perforation. Positive bilateral maxillary sinus tenderness. Oropharynx is slightly injected without exudate. No tonsillar enlargement.    Neck: Mild anterior " cervical, nontender lymphadenopathy  Respiratory: Unlabored respiration, lungs clear to auscultation, no wheezes, no rhonchi  Cardiovascular: Normal rate and rhythm  Psych: Alert, pleasant, well-groomed, normal affect    A/P:  1. Acute non-recurrent maxillary sinusitis  -She'll start Augmentin and Flonase, follow-up here in 14 days if not resolved, sooner with any worsening.  - amoxicillin-clavulanate (AUGMENTIN) 875-125 MG Tab; Take 1 Tab by mouth 2 times a day for 10 days.  Dispense: 20 Tab; Refill: 0  - fluticasone (FLONASE) 50 MCG/ACT nasal spray; Spray 2 Sprays in nose every day.  Dispense: 1 Bottle; Refill: 3    2. Obesity, unspecified obesity severity, unspecified obesity type  -doing fine on wellbutrin.  Considering weight loss surgery, referral placed as requested.    3.  Anxiety:  Chronically dependent on Xanax. Hopefully after her weight loss surgery she will be able to more regularly exercise, and we can cut down her Xanax.          No Follow-up on file.       No. CHARLENE screening performed.  STOP BANG Legend: 0-2 = LOW Risk; 3-4 = INTERMEDIATE Risk; 5-8 = HIGH Risk

## 2024-04-16 ENCOUNTER — TELEMEDICINE (OUTPATIENT)
Dept: MEDICAL GROUP | Facility: LAB | Age: 54
End: 2024-04-16
Payer: COMMERCIAL

## 2024-04-16 DIAGNOSIS — J06.9 UPPER RESPIRATORY TRACT INFECTION, UNSPECIFIED TYPE: ICD-10-CM

## 2024-04-16 PROCEDURE — 99213 OFFICE O/P EST LOW 20 MIN: CPT | Mod: 95 | Performed by: NURSE PRACTITIONER

## 2024-04-16 RX ORDER — PROMETHAZINE HYDROCHLORIDE AND CODEINE PHOSPHATE 6.25; 1 MG/5ML; MG/5ML
5-10 SYRUP ORAL 4 TIMES DAILY PRN
Qty: 240 ML | Refills: 0 | Status: SHIPPED | OUTPATIENT
Start: 2024-04-16 | End: 2024-04-16 | Stop reason: SDUPTHER

## 2024-04-16 RX ORDER — AMOXICILLIN AND CLAVULANATE POTASSIUM 875; 125 MG/1; MG/1
1 TABLET, FILM COATED ORAL 2 TIMES DAILY
Qty: 14 TABLET | Refills: 0 | Status: SHIPPED | OUTPATIENT
Start: 2024-04-16

## 2024-04-16 RX ORDER — PROMETHAZINE HYDROCHLORIDE AND CODEINE PHOSPHATE 6.25; 1 MG/5ML; MG/5ML
5-10 SYRUP ORAL 4 TIMES DAILY PRN
Qty: 240 ML | Refills: 0 | Status: SHIPPED | OUTPATIENT
Start: 2024-04-16 | End: 2024-04-23

## 2024-04-16 NOTE — PROGRESS NOTES
Virtual Visit: Established Patient   This visit was conducted via Zoom using secure and encrypted videoconferencing technology.   The patient was in their home in the state of [place].    The patient's identity was confirmed and verbal consent was obtained for this virtual visit.     Subjective:   CC:   Chief Complaint   Patient presents with    Cough     X 5 days , coughing green mucus      Bella Garibay is a 53 y.o. female presenting for evaluation and management of:    URI  Onset about 6 days ago. Reports congestion, headache, fatigue, myalgias, cough, sore throat,   Denies fever, chills, shortness of breath, sinus pain/pressure.  Has been taking OTC cold medication without relief.   No known sick contacts.   No recent abx.   Is also requesting refill of codeine cough syrup as she is having difficulty sleeping due to cough.     ROS   As documented in history of present illness above    Current medicines (including changes today)  Current Outpatient Medications   Medication Sig Dispense Refill    sertraline (ZOLOFT) 100 MG Tab Take 1 Tablet by mouth every day. 90 Tablet 1    ibuprofen (MOTRIN) 800 MG Tab Take 1 Tablet by mouth every 8 hours as needed for Mild Pain. With food. 30 Tablet 0    Tirzepatide (MOUNJARO) 5 MG/0.5ML Solution Pen-injector Inject 5 mg under the skin every 7 days. 2 mL 5    Levothyroxine Sodium 175 MCG Cap 175 mcg 5 d per week and 1/2 pill 2 d per week. 30 Capsule 0    buPROPion (WELLBUTRIN XL) 150 MG XL tablet TAKE 1 TABLET BY MOUTH IN THE  MORNING 90 Tablet 3    amitriptyline (ELAVIL) 150 MG Tab TAKE 1 TABLET BY MOUTH AT  BEDTIME AS NEEDED FOR SLEEP 90 Tablet 3    cyanocobalamin (VITAMIN B-12) 1000 MCG/ML Solution INJECT INTRAMUSCULARLY 1 ML AS  DIRECTED EVERY 7 DAYS (DISCARD  28 DAYS AFTER FIRST USE) 8 mL 3    phentermine 15 MG capsule TAKE 1 ORAL CAPSULE 2 TIMES A DAY E88.9      Empagliflozin (JARDIANCE) 25 MG Tab Take 1 Tablet by mouth every day. 90 Tablet 3    GENOTROPIN 1 MG  "Recon Soln        No current facility-administered medications for this visit.       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    Tinea versicolor 08/31/2018    Other constipation 08/31/2018    Controlled type 2 diabetes mellitus without complication ([facility]) 08/24/2017    Chronic bilateral low back pain with right-sided sciatica 08/24/2017    BMI 45.0-49.9, adult ([facility]) 08/15/2017    Right knee pain 06/16/2016    Hypothyroidism due to acquired atrophy of thyroid 11/06/2015    Insomnia 03/20/2014    CAROLA (generalized anxiety disorder) 09/06/2011    Obesity 07/24/2009        Objective:   LMP  (LMP Unknown)     Physical Exam:  Constitutional: Alert, no distress, well-groomed.  Skin: No rashes in visible areas.  Eye: Round. Conjunctiva clear, lids normal. No icterus.   ENMT: Lips pink without lesions, good dentition, moist mucous membranes. Phonation normal.  Neck: No masses, no thyromegaly. Moves freely without pain.  Respiratory: Unlabored respiratory effort, no cough or audible wheeze  Psych: Alert and oriented x3, normal affect and mood.     Assessment and Plan:   The following treatment plan was discussed:     1. Upper respiratory tract infection, unspecified type  Patient to take antibiotic as directed, potential side effects of medication discussed. Probiotic use encouraged. Flonase as directed. Sleep with HOB elevated, humidifier at night, rest, increase fluid intake. Supportive care, differential diagnoses, and indications for immediate follow-up discussed with patient. Pathogenesis of diagnosis discussed including typical length and natural progression. Instructed to return to clinic for any change in condition, further concerns, or worsening of symptoms. Patient states understanding of the plan of " care and discharge instructions.  Obtained and reviewed patient utilization report from [facility] pharmacy database on 4/16/2024 prior to writing prescription for controlled substance II, III or IV per [place] bill . Based on assessment of the report, the prescription is medically necessary.   - amoxicillin-clavulanate (AUGMENTIN) 875-125 MG Tab; Take 1 Tablet by mouth 2 times a day.  Dispense: 14 Tablet; Refill: 0  - promethazine-codeine (PHENERGAN-CODEINE) 6.25-10 MG/5ML Syrup; Take 5-10 mL by mouth 4 times a day as needed for Cough for up to 7 days.  Dispense: 240 mL; Refill: 0

## 2024-06-07 ENCOUNTER — OFFICE VISIT (OUTPATIENT)
Dept: URGENT CARE | Facility: CLINIC | Age: 54
End: 2024-06-07
Payer: COMMERCIAL

## 2024-06-07 VITALS
TEMPERATURE: 97.8 F | BODY MASS INDEX: 38.73 KG/M2 | OXYGEN SATURATION: 96 % | HEIGHT: 66 IN | DIASTOLIC BLOOD PRESSURE: 68 MMHG | SYSTOLIC BLOOD PRESSURE: 112 MMHG | HEART RATE: 84 BPM | RESPIRATION RATE: 12 BRPM | WEIGHT: 241 LBS

## 2024-06-07 DIAGNOSIS — L03.213 PRESEPTAL CELLULITIS OF LEFT EYE: ICD-10-CM

## 2024-06-07 PROCEDURE — 3074F SYST BP LT 130 MM HG: CPT | Performed by: PHYSICIAN ASSISTANT

## 2024-06-07 PROCEDURE — 99214 OFFICE O/P EST MOD 30 MIN: CPT | Performed by: PHYSICIAN ASSISTANT

## 2024-06-07 PROCEDURE — 3078F DIAST BP <80 MM HG: CPT | Performed by: PHYSICIAN ASSISTANT

## 2024-06-07 RX ORDER — AMOXICILLIN AND CLAVULANATE POTASSIUM 875; 125 MG/1; MG/1
1 TABLET, FILM COATED ORAL 2 TIMES DAILY
Qty: 20 TABLET | Refills: 0 | Status: SHIPPED | OUTPATIENT
Start: 2024-06-07

## 2024-06-07 ASSESSMENT — FIBROSIS 4 INDEX: FIB4 SCORE: 0.82

## 2024-06-07 NOTE — LETTER
June 7, 2024         Patient: Bella Garibay   YOB: 1970   Date of Visit: 6/7/2024           To Whom it May Concern:    Bella Garibay was seen in my clinic on 6/7/2024. She must stay indoors for 48 hours due to acute condition.    If you have any questions or concerns, please don't hesitate to call.        Sincerely,           Gerald Blanchard P.A.-C.  Electronically Signed

## 2024-06-09 ASSESSMENT — ENCOUNTER SYMPTOMS
RESPIRATORY NEGATIVE: 1
BLURRED VISION: 0
EYE REDNESS: 1
CHILLS: 0
MUSCULOSKELETAL NEGATIVE: 1
EYE ITCHING: 0
DOUBLE VISION: 0
DIARRHEA: 0
NEUROLOGICAL NEGATIVE: 1
PHOTOPHOBIA: 0
FEVER: 0
NAUSEA: 0
CARDIOVASCULAR NEGATIVE: 1
EYE PAIN: 1
VOMITING: 0
ABDOMINAL PAIN: 0
EYE DISCHARGE: 0

## 2024-06-09 ASSESSMENT — VISUAL ACUITY: OU: 1

## 2024-06-09 NOTE — PROGRESS NOTES
Subjective     Bella Garibay is a very pleasant 53 y.o. female who presents with Eye Problem (Started symptoms about 3 weeks ago itchy/swelling/crusted eyes )            Redness, swelling, tenderness of the infraorbital region on the left eye now spreading to the right eye.  Was seen by her PCP approximately 2 weeks ago and given doxycycline and Polytrim.  She states she stopped the antibiotic after 5 days as symptoms had resolved.  However, 3 days ago symptoms returned and are of exact same presentation.  She denies fever, headache, dizziness or other systemic symptoms.  No inner eye involvement including redness, discharge, blurry or double vision.    Eye Problem   Both eyes are affected. This is a new problem. The current episode started in the past 7 days. The problem occurs constantly. The problem has been gradually worsening. There was no injury mechanism. The pain is at a severity of 4/10. The pain is mild. There is No known exposure to pink eye. She Does not wear contacts. Associated symptoms include eye redness. Pertinent negatives include no blurred vision, eye discharge, double vision, fever, itching, nausea, photophobia, recent URI or vomiting. The treatment provided no relief.       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  MEDS:   Current Outpatient Medications:     amoxicillin-clavulanate (AUGMENTIN) 875-125 MG Tab, Take 1 Tablet by mouth 2 times a day., Disp: 20 Tablet, Rfl: 0    sertraline (ZOLOFT) 100 MG Tab, Take 1 Tablet by mouth every day., Disp: 90 Tablet, Rfl: 1    ibuprofen (MOTRIN) 800 MG Tab, Take 1 Tablet by mouth every 8 hours as needed for Mild Pain. With food., Disp: 30  Tablet, Rfl: 0    Tirzepatide (MOUNJARO) 5 MG/0.5ML Solution Pen-injector, Inject 5 mg under the skin every 7 days., Disp: 2 mL, Rfl: 5    Levothyroxine Sodium 175 MCG Cap, 175 mcg 5 d per week and 1/2 pill 2 d per week., Disp: 30 Capsule, Rfl: 0    buPROPion (WELLBUTRIN XL) 150 MG XL tablet, TAKE 1 TABLET BY MOUTH IN THE  MORNING, Disp: 90 Tablet, Rfl: 3    amitriptyline (ELAVIL) 150 MG Tab, TAKE 1 TABLET BY MOUTH AT  BEDTIME AS NEEDED FOR SLEEP, Disp: 90 Tablet, Rfl: 3    cyanocobalamin (VITAMIN B-12) 1000 MCG/ML Solution, INJECT INTRAMUSCULARLY 1 ML AS  DIRECTED EVERY 7 DAYS (DISCARD  28 DAYS AFTER FIRST USE), Disp: 8 mL, Rfl: 3    phentermine 15 MG capsule, TAKE 1 ORAL CAPSULE 2 TIMES A DAY E88.9, Disp: , Rfl:     Empagliflozin (JARDIANCE) 25 MG Tab, Take 1 Tablet by mouth every day., Disp: 90 Tablet, Rfl: 3    GENOTROPIN 1 MG Recon Soln, , Disp: , Rfl:   ALLERGIES: No Known Allergies  SURGHX:   Past Surgical History:   Procedure Laterality Date    MI COLONOSCOPY,DIAGNOSTIC N/A 2/25/2021    Procedure: COLONOSCOPY;  Surgeon: Jem Rick M.D.;  Location: SURGERY SAME DAY [facility];  Service: Gastroenterology    COLONOSCOPY WITH POLYP N/A 2/25/2021    Procedure: COLONOSCOPY, WITH POLYPECTOMY;  Surgeon: Jem Rick M.D.;  Location: SURGERY SAME DAY [facility];  Service: Gastroenterology    DENTAL RESTORATION  10/2017    dental implants to all teeth    PRIMARY C SECTION  1995    DENTAL EXTRACTION(S)  1980's    wisdom teeth     SOCHX:  reports that she has been smoking cigarettes. She has never been exposed to tobacco smoke. She has never used smokeless tobacco. She reports that she does not currently use alcohol. She reports that she does not currently use drugs.  FH: family history includes Alcohol/Drug in her mother; Cancer in her maternal aunt, maternal grandmother, maternal uncle, and mother.      Review of Systems   Constitutional:  Negative for chills and fever.   Eyes:  Positive for pain and redness.  "Negative for blurred vision, double vision, photophobia, discharge and itching.   Respiratory: Negative.     Cardiovascular: Negative.    Gastrointestinal:  Negative for abdominal pain, diarrhea, nausea and vomiting.   Genitourinary: Negative.    Musculoskeletal: Negative.    Neurological: Negative.        Medications, Allergies, and current problem list reviewed today in Epic             Objective     /68 (BP Location: Left arm, Patient Position: Sitting)   Pulse 84   Temp 36.6 °C (97.8 °F) (Temporal)   Resp 12   Ht 1.676 m (5' 6\")   Wt 109 kg (241 lb)   LMP  (LMP Unknown)   SpO2 96%   BMI 38.90 kg/m²      Physical Exam  Vitals and nursing note reviewed.   Constitutional:       General: She is not in acute distress.     Appearance: Normal appearance. She is well-developed. She is not ill-appearing, toxic-appearing or diaphoretic.   HENT:      Head: Normocephalic and atraumatic.      Right Ear: Hearing, tympanic membrane, ear canal and external ear normal.      Left Ear: Hearing, tympanic membrane, ear canal and external ear normal.      Nose: Nose normal. No congestion or rhinorrhea.   Eyes:      General: Vision grossly intact.         Right eye: No discharge.         Left eye: No discharge.      Extraocular Movements: Extraocular movements intact.      Conjunctiva/sclera: Conjunctivae normal.      Right eye: Right conjunctiva is not injected.      Left eye: Left conjunctiva is not injected.      Pupils: Pupils are equal, round, and reactive to light.        Comments: Erythema, edema, warmth and tenderness of the bilateral infraorbital region.  There is slight superior eyelid involvement on the left eye.  No bony tenderness.   Cardiovascular:      Rate and Rhythm: Normal rate and regular rhythm.      Heart sounds: Normal heart sounds.   Pulmonary:      Effort: Pulmonary effort is normal. No respiratory distress.      Breath sounds: Normal breath sounds. No wheezing.   Musculoskeletal:      Cervical " back: Normal range of motion and neck supple.   Skin:     General: Skin is warm and dry.   Neurological:      General: No focal deficit present.      Mental Status: She is alert and oriented to person, place, and time.   Psychiatric:         Mood and Affect: Mood normal.         Behavior: Behavior normal.         Thought Content: Thought content normal.         Judgment: Judgment normal.                             Assessment & Plan     This is a very pleasant 53-year-old female presenting with redness, swelling and tenderness of her bilateral orbital region for the past 3 days.  She was seen by her PCP 3 weeks ago for same presentation.  She states she took 5 days of doxycycline with complete resolution.  However symptoms have returned.  She denies fever, dizziness, blurry vision, headache.  No inner eye involvement including conjunctival redness or discharge.  No pertinent past ocular history.  Vital signs are normal.  Exam shows erythema, edema, warmth and tenderness of the bilateral infraorbital region.  There is slight superior eyelid involvement to the left eye.  There is no bony tenderness.  PERRLA, EOMs intact without entrapment.  Vision grossly intact.  Conjunctiva normal.  Remainder of exam reassuring.  Symptoms and presentation are consistent with preseptal cellulitis.  Did discuss possibility of allergic reaction, therefore did recommend Benadryl or Zyrtec.  Strict ER and red flag symptoms pertaining to orbital cellulitis discussed at length with patient and .      1. Preseptal cellulitis of left eye  amoxicillin-clavulanate (AUGMENTIN) 875-125 MG Tab            I personally reviewed prior external notes and test results pertinent to today's visit. Return to clinic or go to ED if symptoms worsen or persist. Red flag symptoms and indications for ED discussed at length. Patient/Parent/Guardian voices understanding.  AVS with post-visit instructions printed and provided or given verbally.  Follow-up  with your primary care provider in 3-5 days. All side effects and potential interactions of prescribed medication discussed including allergic response, GI upset, tendon injury, rash, sedation, OCP effectiveness, etc.    Please note that this dictation was created using voice recognition software. I have made every reasonable attempt to correct obvious errors, but I expect that there are errors of grammar and possibly content that I did not discover before finalizing the note.       1. Preseptal cellulitis of left eye  amoxicillin-clavulanate (AUGMENTIN) 875-125 MG Tab

## 2024-06-24 RX ORDER — LEVOTHYROXINE SODIUM 175 UG/1
175 TABLET ORAL
Qty: 90 TABLET | Refills: 3 | Status: SHIPPED | OUTPATIENT
Start: 2024-06-24

## 2024-07-16 ENCOUNTER — OFFICE VISIT (OUTPATIENT)
Dept: MEDICAL GROUP | Facility: LAB | Age: 54
End: 2024-07-16
Payer: COMMERCIAL

## 2024-07-16 VITALS
OXYGEN SATURATION: 94 % | SYSTOLIC BLOOD PRESSURE: 106 MMHG | TEMPERATURE: 95.3 F | DIASTOLIC BLOOD PRESSURE: 64 MMHG | WEIGHT: 233.4 LBS | RESPIRATION RATE: 14 BRPM | HEART RATE: 82 BPM | BODY MASS INDEX: 37.51 KG/M2 | HEIGHT: 66 IN

## 2024-07-16 DIAGNOSIS — Z77.098 EXPOSURE TO CHEMICAL INHALATION: ICD-10-CM

## 2024-07-16 DIAGNOSIS — N90.4 LICHEN SCLEROSUS OF FEMALE GENITALIA: ICD-10-CM

## 2024-07-16 DIAGNOSIS — J02.9 PHARYNGITIS, UNSPECIFIED ETIOLOGY: ICD-10-CM

## 2024-07-16 PROCEDURE — 3078F DIAST BP <80 MM HG: CPT | Performed by: NURSE PRACTITIONER

## 2024-07-16 PROCEDURE — 99213 OFFICE O/P EST LOW 20 MIN: CPT | Performed by: NURSE PRACTITIONER

## 2024-07-16 PROCEDURE — 3074F SYST BP LT 130 MM HG: CPT | Performed by: NURSE PRACTITIONER

## 2024-07-16 RX ORDER — ALBUTEROL SULFATE 90 UG/1
2 AEROSOL, METERED RESPIRATORY (INHALATION) EVERY 4 HOURS PRN
Qty: 1 EACH | Refills: 0 | Status: SHIPPED | OUTPATIENT
Start: 2024-07-16

## 2024-07-16 RX ORDER — TRIAMCINOLONE ACETONIDE 0.25 MG/G
1 CREAM TOPICAL 2 TIMES DAILY
Qty: 30 G | Refills: 0 | Status: SHIPPED | OUTPATIENT
Start: 2024-07-16

## 2024-07-16 RX ORDER — PREDNISONE 20 MG/1
40 TABLET ORAL DAILY
Qty: 10 TABLET | Refills: 0 | Status: SHIPPED | OUTPATIENT
Start: 2024-07-16 | End: 2024-07-21

## 2024-07-16 RX ORDER — HYDROCODONE BITARTRATE AND ACETAMINOPHEN 5; 325 MG/1; MG/1
1 TABLET ORAL EVERY 8 HOURS PRN
Qty: 15 TABLET | Refills: 0 | Status: SHIPPED | OUTPATIENT
Start: 2024-07-16 | End: 2024-07-21

## 2024-07-16 ASSESSMENT — FIBROSIS 4 INDEX: FIB4 SCORE: 0.82

## 2024-10-21 ENCOUNTER — HOSPITAL ENCOUNTER (OUTPATIENT)
Facility: MEDICAL CENTER | Age: 54
End: 2024-10-21
Payer: COMMERCIAL

## 2024-10-21 ENCOUNTER — OFFICE VISIT (OUTPATIENT)
Dept: URGENT CARE | Facility: CLINIC | Age: 54
End: 2024-10-21
Payer: COMMERCIAL

## 2024-10-21 VITALS
RESPIRATION RATE: 16 BRPM | HEIGHT: 64 IN | BODY MASS INDEX: 39.78 KG/M2 | TEMPERATURE: 97.9 F | HEART RATE: 93 BPM | SYSTOLIC BLOOD PRESSURE: 110 MMHG | DIASTOLIC BLOOD PRESSURE: 68 MMHG | WEIGHT: 233 LBS | OXYGEN SATURATION: 96 %

## 2024-10-21 DIAGNOSIS — R31.9 HEMATURIA, UNSPECIFIED TYPE: ICD-10-CM

## 2024-10-21 DIAGNOSIS — R30.0 DYSURIA: ICD-10-CM

## 2024-10-21 LAB
APPEARANCE UR: NORMAL
BILIRUB UR STRIP-MCNC: NEGATIVE MG/DL
COLOR UR AUTO: YELLOW
GLUCOSE UR STRIP.AUTO-MCNC: >=1000 MG/DL
KETONES UR STRIP.AUTO-MCNC: NEGATIVE MG/DL
LEUKOCYTE ESTERASE UR QL STRIP.AUTO: NORMAL
NITRITE UR QL STRIP.AUTO: NEGATIVE
PH UR STRIP.AUTO: 6.5 [PH] (ref 5–8)
PROT UR QL STRIP: 30 MG/DL
RBC UR QL AUTO: NORMAL
SP GR UR STRIP.AUTO: 1.02
UROBILINOGEN UR STRIP-MCNC: 1 MG/DL

## 2024-10-21 PROCEDURE — 87077 CULTURE AEROBIC IDENTIFY: CPT

## 2024-10-21 PROCEDURE — 3078F DIAST BP <80 MM HG: CPT

## 2024-10-21 PROCEDURE — 3074F SYST BP LT 130 MM HG: CPT

## 2024-10-21 PROCEDURE — 87086 URINE CULTURE/COLONY COUNT: CPT

## 2024-10-21 PROCEDURE — 99213 OFFICE O/P EST LOW 20 MIN: CPT

## 2024-10-21 PROCEDURE — 81002 URINALYSIS NONAUTO W/O SCOPE: CPT

## 2024-10-21 RX ORDER — PHENAZOPYRIDINE HYDROCHLORIDE 200 MG/1
200 TABLET, FILM COATED ORAL 3 TIMES DAILY PRN
Qty: 6 TABLET | Refills: 0 | Status: SHIPPED | OUTPATIENT
Start: 2024-10-21

## 2024-10-21 RX ORDER — NEOMYCIN SULFATE, POLYMYXIN B SULFATE AND DEXAMETHASONE 3.5; 10000; 1 MG/ML; [USP'U]/ML; MG/ML
SUSPENSION/ DROPS OPHTHALMIC
COMMUNITY
Start: 2024-07-25

## 2024-10-21 RX ORDER — SULFAMETHOXAZOLE AND TRIMETHOPRIM 800; 160 MG/1; MG/1
1 TABLET ORAL 2 TIMES DAILY
Qty: 14 TABLET | Refills: 0 | Status: SHIPPED | OUTPATIENT
Start: 2024-10-21 | End: 2024-10-28

## 2024-10-21 ASSESSMENT — ENCOUNTER SYMPTOMS
DIZZINESS: 0
FLANK PAIN: 0
NAUSEA: 0
SWEATS: 0
BACK PAIN: 0
NECK PAIN: 0
BLOOD IN STOOL: 0
DIARRHEA: 0
CONSTIPATION: 0
FALLS: 0
FEVER: 0
HEARTBURN: 0
CHILLS: 0
ABDOMINAL PAIN: 1
MYALGIAS: 0
VOMITING: 0

## 2024-10-21 ASSESSMENT — FIBROSIS 4 INDEX: FIB4 SCORE: 0.84

## 2024-10-24 LAB
BACTERIA UR CULT: NORMAL
SIGNIFICANT IND 70042: NORMAL
SITE SITE: NORMAL
SOURCE SOURCE: NORMAL

## 2024-12-01 DIAGNOSIS — N90.4 LICHEN SCLEROSUS OF FEMALE GENITALIA: ICD-10-CM

## 2024-12-02 RX ORDER — TRIAMCINOLONE ACETONIDE 0.25 MG/G
1 CREAM TOPICAL 2 TIMES DAILY
Qty: 30 G | Refills: 0 | Status: SHIPPED | OUTPATIENT
Start: 2024-12-02

## 2024-12-02 NOTE — TELEPHONE ENCOUNTER
Received request via: Pharmacy    Was the patient seen in the last year in this department? Yes  LOV : 10/21/2024  Does the patient have an active prescription (recently filled or refills available) for medication(s) requested? No    Pharm[facility] Name: [facility]    Does the patient have alf Plus and need 100-day supply? (This applies to ALL medications) Patient does not have SCP

## 2024-12-03 ENCOUNTER — APPOINTMENT (OUTPATIENT)
Dept: MEDICAL GROUP | Facility: LAB | Age: 54
End: 2024-12-03
Payer: COMMERCIAL

## 2025-01-02 ENCOUNTER — APPOINTMENT (OUTPATIENT)
Dept: MEDICAL GROUP | Facility: LAB | Age: 55
End: 2025-01-02
Payer: COMMERCIAL

## 2025-01-28 ENCOUNTER — HOSPITAL ENCOUNTER (OUTPATIENT)
Dept: LAB | Facility: MEDICAL CENTER | Age: 55
End: 2025-01-28
Attending: INTERNAL MEDICINE
Payer: COMMERCIAL

## 2025-01-28 LAB
ALBUMIN SERPL BCP-MCNC: 4.4 G/DL (ref 3.2–4.9)
ALBUMIN/GLOB SERPL: 1.4 G/DL
ALP SERPL-CCNC: 62 U/L (ref 30–99)
ALT SERPL-CCNC: 18 U/L (ref 2–50)
ANION GAP SERPL CALC-SCNC: 10 MMOL/L (ref 7–16)
AST SERPL-CCNC: 16 U/L (ref 12–45)
BILIRUB SERPL-MCNC: 0.6 MG/DL (ref 0.1–1.5)
BUN SERPL-MCNC: 25 MG/DL (ref 8–22)
CALCIUM ALBUM COR SERPL-MCNC: 9.6 MG/DL (ref 8.5–10.5)
CALCIUM SERPL-MCNC: 9.9 MG/DL (ref 8.5–10.5)
CHLORIDE SERPL-SCNC: 101 MMOL/L (ref 96–112)
CO2 SERPL-SCNC: 28 MMOL/L (ref 20–33)
COLLECT DURATION TIME SPEC: NORMAL H
CREAT SERPL-MCNC: 1 MG/DL (ref 0.5–1.4)
EST. AVERAGE GLUCOSE BLD GHB EST-MCNC: 100 MG/DL
GFR SERPLBLD CREATININE-BSD FMLA CKD-EPI: 66 ML/MIN/1.73 M 2
GLOBULIN SER CALC-MCNC: 3.2 G/DL (ref 1.9–3.5)
GLUCOSE SERPL-MCNC: 101 MG/DL (ref 65–99)
HBA1C MFR BLD: 5.1 % (ref 4–5.6)
PHOSPHATE SERPL-MCNC: 3.5 MG/DL (ref 2.5–4.5)
POTASSIUM SERPL-SCNC: 4.6 MMOL/L (ref 3.6–5.5)
PROT SERPL-MCNC: 7.6 G/DL (ref 6–8.2)
PTH-INTACT SERPL-MCNC: 32.1 PG/ML (ref 14–72)
SODIUM SERPL-SCNC: 139 MMOL/L (ref 135–145)
T3FREE SERPL-MCNC: 2.89 PG/ML (ref 2–4.4)
T4 FREE SERPL-MCNC: 1.4 NG/DL (ref 0.93–1.7)
TOTAL VOLUME 1105: NORMAL ML
TSH SERPL-ACNC: 0.48 UIU/ML (ref 0.35–5.5)

## 2025-01-28 PROCEDURE — 84481 FREE ASSAY (FT-3): CPT

## 2025-01-28 PROCEDURE — 82626 DEHYDROEPIANDROSTERONE: CPT

## 2025-01-28 PROCEDURE — 82570 ASSAY OF URINE CREATININE: CPT

## 2025-01-28 PROCEDURE — 82088 ASSAY OF ALDOSTERONE: CPT

## 2025-01-28 PROCEDURE — 80053 COMPREHEN METABOLIC PANEL: CPT

## 2025-01-28 PROCEDURE — 84300 ASSAY OF URINE SODIUM: CPT

## 2025-01-28 PROCEDURE — 82340 ASSAY OF CALCIUM IN URINE: CPT

## 2025-01-28 PROCEDURE — 84305 ASSAY OF SOMATOMEDIN: CPT

## 2025-01-28 PROCEDURE — 84439 ASSAY OF FREE THYROXINE: CPT

## 2025-01-28 PROCEDURE — 83970 ASSAY OF PARATHORMONE: CPT

## 2025-01-28 PROCEDURE — 36415 COLL VENOUS BLD VENIPUNCTURE: CPT

## 2025-01-28 PROCEDURE — 84443 ASSAY THYROID STIM HORMONE: CPT

## 2025-01-28 PROCEDURE — 83036 HEMOGLOBIN GLYCOSYLATED A1C: CPT

## 2025-01-28 PROCEDURE — 84105 ASSAY OF URINE PHOSPHORUS: CPT

## 2025-01-28 PROCEDURE — 84100 ASSAY OF PHOSPHORUS: CPT

## 2025-01-28 PROCEDURE — 84133 ASSAY OF URINE POTASSIUM: CPT

## 2025-01-28 PROCEDURE — 83945 ASSAY OF OXALATE: CPT

## 2025-01-29 LAB
CREAT UR-MCNC: 56.9 MG/DL
POTASSIUM UR-SCNC: 56.5 MMOL/L
SODIUM UR-SCNC: 63 MMOL/L

## 2025-01-30 LAB
ALDOST SERPL-MCNC: 18.6 NG/DL
CALCIUM 24H UR-MCNC: 13.2 MG/DL
CALCIUM 24H UR-MRATE: NORMAL MG/D (ref 100–250)
CALCIUM/CREAT 24H UR: 213 MG/G (ref 20–300)
COLLECT DURATION TIME SPEC: NORMAL HRS
COLLECT DURATION TIME SPEC: NORMAL HRS
CREAT 24H UR-MCNC: 62 MG/DL
IGF-I SERPL-MCNC: 148 NG/ML (ref 49–234)
IGF-I Z-SCORE SERPL: 0.6
PHOSPHATE 24H UR-MCNC: 68 MG/DL
PHOSPHATE 24H UR-MRATE: NORMAL MG/D (ref 400–1300)
PHOSPHATE/CREAT 24H UR: 1097 MG/G
SPECIMEN VOL ?TM UR: NORMAL ML
TOTAL VOLUME 1105: NORMAL ML

## 2025-01-31 LAB — DHEA SERPL-MCNC: 1.04 NG/ML (ref 0.63–4.7)

## 2025-02-02 LAB
COLLECT DURATION TIME SPEC: NORMAL HR
CREAT 24H UR-MCNC: 63 MG/DL
OXALATE 24H UR-MCNC: 10 MG/L
OXALATE 24H UR-MRATE: NORMAL MG/D (ref 13–40)
TOTAL VOLUME 1105: NORMAL ML

## 2025-02-13 ENCOUNTER — APPOINTMENT (OUTPATIENT)
Dept: BEHAVIORAL HEALTH | Facility: CLINIC | Age: 55
End: 2025-02-13
Payer: COMMERCIAL

## 2025-02-20 ENCOUNTER — OFFICE VISIT (OUTPATIENT)
Dept: BEHAVIORAL HEALTH | Facility: CLINIC | Age: 55
End: 2025-02-20
Payer: COMMERCIAL

## 2025-02-20 DIAGNOSIS — F41.1 GAD (GENERALIZED ANXIETY DISORDER): ICD-10-CM

## 2025-02-20 DIAGNOSIS — R41.840 INATTENTION: ICD-10-CM

## 2025-02-20 DIAGNOSIS — F33.9 EPISODE OF RECURRENT MAJOR DEPRESSIVE DISORDER, UNSPECIFIED DEPRESSION EPISODE SEVERITY (HCC): ICD-10-CM

## 2025-02-20 DIAGNOSIS — F51.01 PRIMARY INSOMNIA: ICD-10-CM

## 2025-02-20 RX ORDER — BUPROPION HYDROCHLORIDE 300 MG/1
300 TABLET ORAL EVERY MORNING
Qty: 90 TABLET | Refills: 3 | Status: SHIPPED | OUTPATIENT
Start: 2025-02-20

## 2025-02-20 RX ORDER — AMITRIPTYLINE HYDROCHLORIDE 150 MG/1
TABLET ORAL
Qty: 90 TABLET | Refills: 3 | Status: SHIPPED | OUTPATIENT
Start: 2025-02-20

## 2025-02-20 RX ORDER — BUSPIRONE HYDROCHLORIDE 10 MG/1
TABLET ORAL
Qty: 68 TABLET | Refills: 0 | Status: SHIPPED | OUTPATIENT
Start: 2025-02-20 | End: 2025-03-22
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
## 2025-03-15 DIAGNOSIS — F41.1 GAD (GENERALIZED ANXIETY DISORDER): ICD-10-CM

## 2025-03-18 RX ORDER — BUSPIRONE HYDROCHLORIDE 10 MG/1
TABLET ORAL
Qty: 204 TABLET | Refills: 1 | Status: SHIPPED | OUTPATIENT
Start: 2025-03-18 | End: 2025-04-17

## 2025-03-20 ENCOUNTER — APPOINTMENT (OUTPATIENT)
Dept: BEHAVIORAL HEALTH | Facility: CLINIC | Age: 55
End: 2025-03-20
Payer: COMMERCIAL

## 2025-04-04 ENCOUNTER — OFFICE VISIT (OUTPATIENT)
Dept: BEHAVIORAL HEALTH | Facility: CLINIC | Age: 55
End: 2025-04-04
Attending: PSYCHIATRY & NEUROLOGY
Payer: COMMERCIAL

## 2025-04-04 DIAGNOSIS — F90.2 ADHD (ATTENTION DEFICIT HYPERACTIVITY DISORDER), COMBINED TYPE: Primary | ICD-10-CM

## 2025-04-04 PROCEDURE — 96131 PSYCL TST EVAL PHYS/QHP EA: CPT | Performed by: PSYCHOLOGIST

## 2025-04-04 PROCEDURE — 96130 PSYCL TST EVAL PHYS/QHP 1ST: CPT | Performed by: PSYCHOLOGIST

## 2025-04-04 PROCEDURE — 90791 PSYCH DIAGNOSTIC EVALUATION: CPT | Performed by: PSYCHOLOGIST
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
## 2025-04-09 DIAGNOSIS — N90.4 LICHEN SCLEROSUS OF FEMALE GENITALIA: ICD-10-CM

## 2025-04-09 RX ORDER — TRIAMCINOLONE ACETONIDE 0.25 MG/G
1 CREAM TOPICAL 2 TIMES DAILY
Qty: 30 G | Refills: 0 | Status: SHIPPED | OUTPATIENT
Start: 2025-04-09

## 2025-04-12 DIAGNOSIS — F41.1 GAD (GENERALIZED ANXIETY DISORDER): ICD-10-CM

## 2025-04-14 RX ORDER — BUSPIRONE HYDROCHLORIDE 10 MG/1
10 TABLET ORAL 3 TIMES DAILY
Qty: 90 TABLET | Refills: 0 | Status: SHIPPED | OUTPATIENT
Start: 2025-04-14 | End: 2025-04-16 | Stop reason: SDUPTHER

## 2025-04-16 ENCOUNTER — OFFICE VISIT (OUTPATIENT)
Dept: BEHAVIORAL HEALTH | Facility: CLINIC | Age: 55
End: 2025-04-16
Payer: COMMERCIAL

## 2025-04-16 ENCOUNTER — TELEPHONE (OUTPATIENT)
Dept: MEDICAL GROUP | Facility: LAB | Age: 55
End: 2025-04-16
Payer: COMMERCIAL

## 2025-04-16 DIAGNOSIS — F33.9 EPISODE OF RECURRENT MAJOR DEPRESSIVE DISORDER, UNSPECIFIED DEPRESSION EPISODE SEVERITY (HCC): ICD-10-CM

## 2025-04-16 DIAGNOSIS — F90.2 ATTENTION DEFICIT HYPERACTIVITY DISORDER (ADHD), COMBINED TYPE: ICD-10-CM

## 2025-04-16 DIAGNOSIS — F41.1 GAD (GENERALIZED ANXIETY DISORDER): ICD-10-CM

## 2025-04-16 PROCEDURE — 99214 OFFICE O/P EST MOD 30 MIN: CPT | Performed by: PSYCHIATRY & NEUROLOGY

## 2025-04-16 RX ORDER — BUSPIRONE HYDROCHLORIDE 10 MG/1
10 TABLET ORAL 2 TIMES DAILY
Qty: 180 TABLET | Refills: 1 | Status: SHIPPED | OUTPATIENT
Start: 2025-04-16

## 2025-04-16 RX ORDER — DEXTROAMPHETAMINE SACCHARATE, AMPHETAMINE ASPARTATE MONOHYDRATE, DEXTROAMPHETAMINE SULFATE AND AMPHETAMINE SULFATE 2.5; 2.5; 2.5; 2.5 MG/1; MG/1; MG/1; MG/1
10 CAPSULE, EXTENDED RELEASE ORAL EVERY MORNING
Qty: 30 CAPSULE | Refills: 0 | Status: SHIPPED | OUTPATIENT
Start: 2025-04-16 | End: 2025-05-16

## 2025-04-16 RX ORDER — BUPROPION HYDROCHLORIDE 150 MG/1
150 TABLET ORAL EVERY MORNING
Qty: 90 TABLET | Refills: 1 | Status: SHIPPED | OUTPATIENT
Start: 2025-04-16

## 2025-04-16 NOTE — PROGRESS NOTES
PSYCHIATRY FOLLOW-UP NOTE      Name: Bella Garibay  MRN: [id number]  : 1970  Age: 54 y.o.  Date of assessment: 2025  PCP: ERIKA Kumari  Persons in attendance: Patient      REASON FOR VISIT/CHIEF COMPLAINT (as stated by Patient):  Bella Garibay is a 54 y.o., White female, attending follow-up appointment for mood and anxiety management.      HISTORY OF PRESENT ILLNESS:  Bella Garibay is a 54 y.o. old female with CAROLA, MDD and inattention comes in today for follow up. Patient was last seen 2 months ago, and following treatment planning recommendations were done:  Increase Wellbutrin  mg daily for depression.  Add Buspar 5 mg TID X 2 weeks --> 10 mg TID.  Continue Amitriptyline 150 mg HS for sleep.   Initiate referral for psychological testing for ADHD evaluation.    History of Present Illness    Diagnosed with ADHD after psychological testing.    Psychological Testing (25):  patient's test results and history are consistent with Attention Deficit Hyperactivity Disorder, combined type.  It is likely that her anxiety symptoms and fatigue exacerbate her attentional difficulties, but cannot solely explain them.  Although the patient does have a history of TBI (likely mild in severity), this does not explain her attentional symptoms which reportedly began during childhood.       The patient is currently on a regimen of Wellbutrin 300 mg. She also mentions an incident of medication mix-up due to the similar names of Wellbutrin and BuSpar, which led to a disruption in her sleep pattern.   She is currently on BuSpar, which she reports as being more effective in managing her hot flashes compared to Wellbutrin.   However, she discontinued the medication due to a perceived drop in blood pressure and difficulty initiating her day. Upon resumption of the medication at a reduced dose of half a tablet, she experienced a resurgence of hot flashes. She has since returned  to the full dose, taken in the morning, without any adverse effects on her blood pressure.  We discussed the ADHD diagnosis in detail and the patient reports that she took 1 time Adderall prescription from his brother and noticed marked improvement in mood and anxiety with no side effects.  Discussed the risk and benefit and rules associated with use of controlled medication.  After reviewing risk and benefit patient agreed with considering low dose Adderall XR 10 mg and reducing Wellbutrin to 150 mg.  Patient also agreed with buying any blood pressure machine from [employer] today and monitoring her blood pressure at home.  Agree with keeping BuSpar twice daily to help with anxiety.  Patient was educated to monitor for side effects including insomnia, racing heart, blood pressure changes, irritability, appetite suppression, cardiac symptoms or increase in impulsivity.        CURRENT MEDICATIONS:  Current Outpatient Medications   Medication Sig Dispense Refill    busPIRone (BUSPAR) 10 MG Tab tablet Take 1 Tablet by mouth 3 times a day. 90 Tablet 0    triamcinolone acetonide (KENALOG) 0.025 % Cream APPLY TO AFFECTED AREA TWICE A DAY 30 g 0    amitriptyline (ELAVIL) 150 MG Tab TAKE 1 TABLET BY MOUTH AT  BEDTIME AS NEEDED FOR SLEEP 90 Tablet 3    buPROPion (WELLBUTRIN XL) 300 MG XL tablet Take 1 Tablet by mouth every morning. 90 Tablet 3    neomycin-polymyxin-dexamethasone (MAXITROL) 3.5-02757-3.1 Suspension ophthalmic suspension INSTILL 1 DROP INTO BOTH EYES 3 TIMES A DAY (Patient not taking: Reported on 10/21/2024)      phenazopyridine (PYRIDIUM) 200 MG Tab Take 1 Tablet by mouth 3 times a day as needed for Moderate Pain or Mild Pain. 6 Tablet 0    albuterol 108 (90 Base) MCG/ACT Aero Soln inhalation aerosol Inhale 2 Puffs every four hours as needed for Shortness of Breath. (Patient not taking: Reported on 10/21/2024) 1 Each 0    levothyroxine (SYNTHROID) 175 MCG Tab TAKE 1 TABLET BY MOUTH EVERY DAY IN THE MORNING ON AN  "EMPTY STOMACH 90 Tablet 3    amoxicillin-clavulanate (AUGMENTIN) 875-125 MG Tab Take 1 Tablet by mouth 2 times a day. (Patient not taking: Reported on 10/21/2024) 20 Tablet 0    sertraline (ZOLOFT) 100 MG Tab Take 1 Tablet by mouth every day. (Patient not taking: Reported on 10/21/2024) 90 Tablet 1    ibuprofen (MOTRIN) 800 MG Tab Take 1 Tablet by mouth every 8 hours as needed for Mild Pain. With food. 30 Tablet 0    Tirzepatide (MOUNJARO) 5 MG/0.5ML Solution Pen-injector Inject 5 mg under the skin every 7 days. 2 mL 5    Levothyroxine Sodium 175 MCG Cap 175 mcg 5 d per week and 1/2 pill 2 d per week. 30 Capsule 0    cyanocobalamin (VITAMIN B-12) 1000 MCG/ML Solution INJECT INTRAMUSCULARLY 1 ML AS  DIRECTED EVERY 7 DAYS (DISCARD  28 DAYS AFTER FIRST USE) 8 mL 3    phentermine 15 MG capsule TAKE 1 ORAL CAPSULE 2 TIMES A DAY E88.9      Empagliflozin (JARDIANCE) 25 MG Tab Take 1 Tablet by mouth every day. 90 Tablet 3    GENOTROPIN 1 MG Recon Soln        No current facility-administered medications for this visit.       MEDICAL HISTORY  Past Medical History:   Diagnosis Date    Anesthesia     \"hard to sedate\"    Arthritis     Osteo to lower back    Bowel habit changes     Bronchitis 6/17/2014    Dental disorder     All dental implants-upper permanent and lower snap in.    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    Hypothyroid 7/24/2009    Obesity 7/24/2009    Panhypopituitarism (HCC)     Psychiatric problem     anxiety and depression S/P TBI and wt gain.      Past Surgical History:   Procedure Laterality Date    [place] COLONOSCOPY-FLEXIBLE N/A 2/25/2021    Procedure: COLONOSCOPY;  Surgeon: Jem Rick M.D.;  Location: SURGERY SAME DAY [facility];  Service: Gastroenterology    COLONOSCOPY WITH POLYP N/A 2/25/2021 "    Procedure: COLONOSCOPY, WITH POLYPECTOMY;  Surgeon: Jem Rick M.D.;  Location: SURGERY SAME DAY [facility];  Service: Gastroenterology    DENTAL RESTORATION  10/2017    dental implants to all teeth    PRIMARY C SECTION  1995    DENTAL EXTRACTION(S)  1980's    wisdom teeth       PAST PSYCHIATRIC MEDICATIONS  Lexapro, Zoloft  Wellbutrin  Amitriptyline      Xanax, Gabapentin        REVIEW OF SYSTEMS:        Constitutional  Obesity   Eyes negative   Ears/Nose/Mouth/Throat negative   Cardiovascular negative   Respiratory negative   Gastrointestinal negative   Genitourinary negative   Muscular negative   Integumentary negative   Neurological negative   Endocrine  Hypothyroidism; DM; Hypopituitarism   Hematologic/Lymphatic negative     PHYSICAL EXAMINAION:  Vital signs: LMP  (LMP Unknown)   Musculoskeletal: Normal gait.   Abnormal movements: none      MENTAL STATUS EXAMINATION      General:   - Grooming and hygiene: Casual,   - Apparent distress: tense,   - Behavior: Tense  - Eye Contact:  Good,   - no psychomotor agitation or retardation    - Participation: Active verbal participation  Orientation: Alert and Fully Oriented to person, place and time  Mood: Anxious  Affect: Flexible and Full range,  Thought Process: Logical and Goal-directed  Thought Content: Denies suicidal or homicidal ideations, intent or plan   Perception: Denies auditory or visual hallucinations. No delusions noted   Attention span and concentration: inattention  Speech:Rate within normal limits and Volume within normal limits  Language: Appropriate   Insight: Adequate  Judgment: Adequate  Recent and remote memory:  not tested        DEPRESSION SCREENING:      3/8/2023     7:20 AM 6/8/2023     2:00 PM 1/31/2024     3:30 PM   Depression Screen (PHQ-2/PHQ-9)   PHQ-2 Total Score 0 2 0   PHQ-9 Total Score  12        Interpretation of PHQ-9 Total Score   Score Severity   1-4 No Depression   5-9 Mild Depression   10-14 Moderate Depression   15-19  Moderately Severe Depression   20-27 Severe Depression    CURRENT RISK:       Suicidal: Low       Homicidal: Low       Self-Harm: Low       Relapse: Low       Crisis Safety Plan Reviewed Not Indicated       If evidence of imminent risk is present, intervention/plan:      MEDICAL RECORDS/LABS/DIAGNOSTIC TESTS REVIEWED:  No new lab since last visit     [place]  records -   Reviewed     PLAN:  (1) CAROLA; (2) MDD; (3) Inattention r/o ADHD  Not improving  Add Adderall XR 10 mg daily.  Reduce Wellbutrin  mg daily for depression.  Continue Buspar 10 mg BID  Continue Amitriptyline 150 mg HS for sleep.   Controlled medication treatment agreement signed on 4/16/25.  Medication options, alternatives (including no medications) and medication risks/benefits/side effects were discussed in detail.  Explained importance of contraceptive measures while on psychotropic medications, educated to let provider know if ever pregnant or wanting to become pregnant. Verbalized understanding.  The patient was advised to call, message provider on [employer], or come in to the clinic if symptoms worsen or if any future questions/issues regarding their medications arise; the patient verbalized understanding and agreement.    The patient was educated to call 911, call the suicide hotline, or go to local ER if having thoughts of suicide or homicide; verbalized understanding.    Psychological Testing (4/4/25):  patient's test results and history are consistent with Attention Deficit Hyperactivity Disorder, combined type.  It is likely that her anxiety symptoms and fatigue exacerbate her attentional difficulties, but cannot solely explain them.  Although the patient does have a history of TBI (likely mild in severity), this does not explain her attentional symptoms which reportedly began during childhood.       Billing Coding based on:  80629 based on [facility]  : based on the medical complexity and need for changes in treatment discussed  above    Return to clinic in 1 month or sooner if symptoms worsen.  Next Appointment: instruction provided on how to make the next appointment.     The proposed treatment plan was discussed with the patient who was provided the opportunity to ask questions and make suggestions regarding alternative treatment. Patient verbalized understanding and expressed agreement with the plan.       Pablo Hayden M.D.  04/16/25    This note was created using voice recognition software (Dragon). The accuracy of the dictation is limited by the abilities of the software. I have reviewed the note prior to signing, however some errors in grammar and context are still possible. If you have any questions related to this note please do not hesitate to contact our office.

## 2025-04-16 NOTE — PATIENT INSTRUCTIONS
The patient was offered the opportunity to review her test results. The patient elected to receive them via [employer] and will call with questions.  It is recommended that the patient be considered for possible pharmacological intervention of her ADHD symptoms.   The patient may benefit from psychotherapy services.  She could contact [facility] at [phone], [facility] at [phone], [employer] at [phone], or use the search engine [employer].          5. The patient may benefit from the following suggestions:  Organize home and/or workspace - designate specific areas for things like keys, wallet, phone, bills  Get in the habit of taking notes - effective use of a day planner or calendar with reminders can help manage appointments and deadlines  Uses lists - make and use to do lists.  There are a number of free to do list apps for phones  Use a watch/timers - allot time for each task and use a timer or alarm to alert when time is up  Give yourself more time for tasks than you think  Plan to be early for appointments - write down appointments for 15 minutes earlier than they are  For money management - switch to online banking, set up bill pay reminders, take advantage of technology   Break larger tasks up into smaller ones - plan to allow for breaks.  Try the pomodoro method.  Choose a task, set a timer for 25 minutes, and focus on that task without interruption. When the timer goes off, take a five-minute break. Each 25-minute work session is called a pomodoro. After completing four “pomodoros” in succession, take a 30-minute break.  Set aside daily time for organization - set aside 5-10 minutes a day to clear your desk and organize paperwork  Exercise and spend time outdoors when possible  Avoid caffeine late in the day  Create a predictable and quiet bedtime routine  Stick to a regular sleep-wake schedule, even on weekends    6. The patient may also benefit from  adjunctive non-pharmacologic mindfulness-based interventions to assist with the management of her ADHD related symptoms, including:  The book, The Mindfulness Prescription for Adult ADHD, by Jovanna Reeves MD is an approachable guide for implementing mindfulness-based techniques specific to assisting with adult ADHD symptom management.   The book Wherever You Go, There You Are: Mindfulness Meditation in Everyday Life, by Rudolph Bustillo is an excellent introduction to the practice of mindfulness, which has demonstrated benefit in individuals with ADHD.  The book The Smart, but Scattered Guide to Success by Idalia Robles EdD is a great resource for adults to assist with managing executive functioning.      She may benefit from utilizing Dr. Min Myrick's book Taking Charge of Adult ADHD and the online resource of [employer].

## 2025-04-18 ENCOUNTER — APPOINTMENT (OUTPATIENT)
Dept: BEHAVIORAL HEALTH | Facility: CLINIC | Age: 55
End: 2025-04-18
Payer: COMMERCIAL

## 2025-05-13 DIAGNOSIS — F41.1 GAD (GENERALIZED ANXIETY DISORDER): ICD-10-CM

## 2025-05-13 RX ORDER — BUSPIRONE HYDROCHLORIDE 10 MG/1
10 TABLET ORAL 2 TIMES DAILY
Qty: 180 TABLET | Refills: 1 | Status: SHIPPED | OUTPATIENT
Start: 2025-05-13

## 2025-05-16 ENCOUNTER — OFFICE VISIT (OUTPATIENT)
Dept: BEHAVIORAL HEALTH | Facility: CLINIC | Age: 55
End: 2025-05-16
Payer: COMMERCIAL

## 2025-05-16 DIAGNOSIS — F90.2 ATTENTION DEFICIT HYPERACTIVITY DISORDER (ADHD), COMBINED TYPE: Primary | ICD-10-CM

## 2025-05-16 DIAGNOSIS — F41.1 GAD (GENERALIZED ANXIETY DISORDER): ICD-10-CM

## 2025-05-16 DIAGNOSIS — F33.9 EPISODE OF RECURRENT MAJOR DEPRESSIVE DISORDER, UNSPECIFIED DEPRESSION EPISODE SEVERITY (HCC): ICD-10-CM

## 2025-05-16 PROCEDURE — 99214 OFFICE O/P EST MOD 30 MIN: CPT | Performed by: PSYCHIATRY & NEUROLOGY

## 2025-05-16 RX ORDER — DEXTROAMPHETAMINE SACCHARATE, AMPHETAMINE ASPARTATE MONOHYDRATE, DEXTROAMPHETAMINE SULFATE AND AMPHETAMINE SULFATE 2.5; 2.5; 2.5; 2.5 MG/1; MG/1; MG/1; MG/1
10 CAPSULE, EXTENDED RELEASE ORAL EVERY MORNING
Qty: 30 CAPSULE | Refills: 0 | Status: SHIPPED | OUTPATIENT
Start: 2025-05-16 | End: 2025-06-15

## 2025-05-16 RX ORDER — DEXTROAMPHETAMINE SACCHARATE, AMPHETAMINE ASPARTATE, DEXTROAMPHETAMINE SULFATE AND AMPHETAMINE SULFATE 2.5; 2.5; 2.5; 2.5 MG/1; MG/1; MG/1; MG/1
10 TABLET ORAL
Qty: 30 TABLET | Refills: 0 | Status: SHIPPED | OUTPATIENT
Start: 2025-05-16 | End: 2025-06-15

## 2025-05-16 NOTE — PROGRESS NOTES
PSYCHIATRY FOLLOW-UP NOTE      Name: Bella Garibay  MRN: [id number]  : 1970  Age: 54 y.o.  Date of assessment: 2025  PCP: ERIKA Kumari  Persons in attendance: Patient      REASON FOR VISIT/CHIEF COMPLAINT (as stated by Patient):  Bella Garibay is a 54 y.o., White female, attending follow-up appointment for mood and anxiety management.      HISTORY OF PRESENT ILLNESS:  Bella Garibay is a 54 y.o. old female with CAROLA, MDD and ADHD comes in today for follow up. Patient was last seen 1 month ago, and following treatment planning recommendations were done:  Add Adderall XR 10 mg daily.  Reduce Wellbutrin  mg daily for depression.  Continue Buspar 10 mg BID  Continue Amitriptyline 150 mg HS for sleep.   Controlled medication treatment agreement signed on 25.    History of Present Illness    She has been maintaining a journal to document her experiences.   During the initial week, she experienced heightened excitement and unrealistic expectations but was able to accomplish a significant amount of tasks. She found it necessary to allocate specific time for chores and set reminders for meals and breaks.   In the second week, she began to feel depressed around 2:00 PM each day, which led to frustration over perceived wasted time on unattainable goals. She implemented the use of alarms and found them beneficial. She also started recording business notes.   Currently, she continues to experience upset or depression after 2:00 PM but has been utilizing breathing exercises and cognitive distraction techniques to manage these symptoms.   She reports feeling calm and in control before 2:00 PM, and has been taking breaks and timing herself to prevent overexertion.  Agreed with adding IR 10 mg between 12-1 pm.      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[place]  - Has a good friend who introduced her to cognitive distraction techniques        CURRENT  MEDICATIONS:  Current Medications[1]    MEDICAL HISTORY  Past Medical History[2]  Past Surgical History[3]    PAST PSYCHIATRIC MEDICATIONS  Lexapro, Zoloft  Wellbutrin  Amitriptyline      Xanax, Gabapentin        REVIEW OF SYSTEMS:        Constitutional  Obesity   Eyes negative   Ears/Nose/Mouth/Throat negative   Cardiovascular negative   Respiratory negative   Gastrointestinal negative   Genitourinary negative   Muscular negative   Integumentary negative   Neurological negative   Endocrine  Hypothyroidism; DM; Hypopituitarism   Hematologic/Lymphatic negative     PHYSICAL EXAMINAION:  Vital signs: LMP  (LMP Unknown)   Musculoskeletal: Normal gait.   Abnormal movements: none      MENTAL STATUS EXAMINATION      General:   - Grooming and hygiene: Casual,   - Apparent distress: none,   - Behavior: Calm  - Eye Contact:  Good,   - no psychomotor agitation or retardation    - Participation: Active verbal participation  Orientation: Alert and Fully Oriented to person, place and time  Mood: Anxious  Affect: Flexible,  Thought Process: Logical and Goal-directed  Thought Content: Denies suicidal or homicidal ideations, intent or plan   Perception: Denies auditory or visual hallucinations. No delusions noted   Attention span and concentration: fair  Speech:Rate within normal limits and Volume within normal limits  Language: Appropriate   Insight: Good  Judgment: Good  Recent and remote memory: No gross evidence of memory deficits        DEPRESSION SCREENING:      3/8/2023     7:20 AM 6/8/2023     2:00 PM 1/31/2024     3:30 PM   Depression Screen (PHQ-2/PHQ-9)   PHQ-2 Total Score 0 2 0   PHQ-9 Total Score  12        Interpretation of PHQ-9 Total Score   Score Severity   1-4 No Depression   5-9 Mild Depression   10-14 Moderate Depression   15-19 Moderately Severe Depression   20-27 Severe Depression    CURRENT RISK:       Suicidal: Low       Homicidal: Low       Self-Harm: Low       Relapse: Low       Crisis Safety Plan Reviewed  Not Indicated       If evidence of imminent risk is present, intervention/plan:      MEDICAL RECORDS/LABS/DIAGNOSTIC TESTS REVIEWED:  No new lab since last visit     [place]  records -   Reviewed     PLAN:  (1) CAROLA; (2) MDD; (3) ADHD  Slow improvement  Continue Adderall XR 10 mg daily  Add Adderall IR 10 mg daily.  Continue Wellbutrin  mg daily for depression.  Continue Buspar 10 mg BID  Continue Amitriptyline 150 mg HS for sleep.   Controlled medication treatment agreement signed on 4/16/25.  Medication options, alternatives (including no medications) and medication risks/benefits/side effects were discussed in detail.  Explained importance of contraceptive measures while on psychotropic medications, educated to let provider know if ever pregnant or wanting to become pregnant. Verbalized understanding.  The patient was advised to call, message provider on [employer], or come in to the clinic if symptoms worsen or if any future questions/issues regarding their medications arise; the patient verbalized understanding and agreement.    The patient was educated to call 911, call the suicide hotline, or go to local ER if having thoughts of suicide or homicide; verbalized understanding.     Psychological Testing (4/4/25):  patient's test results and history are consistent with Attention Deficit Hyperactivity Disorder, combined type.  It is likely that her anxiety symptoms and fatigue exacerbate her attentional difficulties, but cannot solely explain them.  Although the patient does have a history of TBI (likely mild in severity), this does not explain her attentional symptoms which reportedly began during childhood.         Billing Coding based on:  94528 based on MDM    Return to clinic in 3 weeks or sooner if symptoms worsen.  Next Appointment: instruction provided on how to make the next appointment.     The proposed treatment plan was discussed with the patient who was provided the opportunity to ask questions and  make suggestions regarding alternative treatment. Patient verbalized understanding and expressed agreement with the plan.       Pablo Hayden M.D.  05/16/25    This note was created using voice recognition software (Dragon). The accuracy of the dictation is limited by the abilities of the software. I have reviewed the note prior to signing, however some errors in grammar and context are still possible. If you have any questions related to this note please do not hesitate to contact our office.            [1]   Current Outpatient Medications   Medication Sig Dispense Refill    busPIRone (BUSPAR) 10 MG Tab tablet Take 1 Tablet by mouth 2 times a day. 180 Tablet 1    amphetamine-dextroamphetamine XR (ADDERALL XR, 10MG,) 10 MG CAPSULE SR 24 HR Take 1 Capsule by mouth every morning for 30 days. 30 Capsule 0    buPROPion (WELLBUTRIN XL) 150 MG XL tablet Take 1 Tablet by mouth every morning. 90 Tablet 1    triamcinolone acetonide (KENALOG) 0.025 % Cream APPLY TO AFFECTED AREA TWICE A DAY 30 g 0    amitriptyline (ELAVIL) 150 MG Tab TAKE 1 TABLET BY MOUTH AT  BEDTIME AS NEEDED FOR SLEEP 90 Tablet 3    neomycin-polymyxin-dexamethasone (MAXITROL) 3.5-35825-5.1 Suspension ophthalmic suspension INSTILL 1 DROP INTO BOTH EYES 3 TIMES A DAY (Patient not taking: Reported on 10/21/2024)      phenazopyridine (PYRIDIUM) 200 MG Tab Take 1 Tablet by mouth 3 times a day as needed for Moderate Pain or Mild Pain. 6 Tablet 0    albuterol 108 (90 Base) MCG/ACT Aero Soln inhalation aerosol Inhale 2 Puffs every four hours as needed for Shortness of Breath. (Patient not taking: Reported on 10/21/2024) 1 Each 0    levothyroxine (SYNTHROID) 175 MCG Tab TAKE 1 TABLET BY MOUTH EVERY DAY IN THE MORNING ON AN EMPTY STOMACH 90 Tablet 3    amoxicillin-clavulanate (AUGMENTIN) 875-125 MG Tab Take 1 Tablet by mouth 2 times a day. (Patient not taking: Reported on 10/21/2024) 20 Tablet 0    sertraline (ZOLOFT) 100 MG Tab Take 1 Tablet by mouth every day.  "(Patient not taking: Reported on 10/21/2024) 90 Tablet 1    ibuprofen (MOTRIN) 800 MG Tab Take 1 Tablet by mouth every 8 hours as needed for Mild Pain. With food. 30 Tablet 0    Tirzepatide (MOUNJARO) 5 MG/0.5ML Solution Pen-injector Inject 5 mg under the skin every 7 days. 2 mL 5    Levothyroxine Sodium 175 MCG Cap 175 mcg 5 d per week and 1/2 pill 2 d per week. 30 Capsule 0    cyanocobalamin (VITAMIN B-12) 1000 MCG/ML Solution INJECT INTRAMUSCULARLY 1 ML AS  DIRECTED EVERY 7 DAYS (DISCARD  28 DAYS AFTER FIRST USE) 8 mL 3    phentermine 15 MG capsule TAKE 1 ORAL CAPSULE 2 TIMES A DAY E88.9      Empagliflozin (JARDIANCE) 25 MG Tab Take 1 Tablet by mouth every day. 90 Tablet 3    GENOTROPIN 1 MG Recon Soln        No current facility-administered medications for this visit.   [2]   Past Medical History:  Diagnosis Date    Anesthesia     \"hard to sedate\"    Arthritis     Osteo to lower back    Bowel habit changes     Bronchitis 6/17/2014    Dental disorder     All dental implants-upper permanent and lower snap in.    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    Hypothyroid 7/24/2009    Obesity 7/24/2009    Panhypopituitarism (HCC)     Psychiatric problem     anxiety and depression S/P TBI and wt gain.    [3]   Past Surgical History:  Procedure Laterality Date    [place] COLONOSCOPY-FLEXIBLE N/A 2/25/2021    Procedure: COLONOSCOPY;  Surgeon: Jem Rick M.D.;  Location: SURGERY SAME DAY [facility];  Service: Gastroenterology    COLONOSCOPY WITH POLYP N/A 2/25/2021    Procedure: COLONOSCOPY, WITH POLYPECTOMY;  Surgeon: Jem Rick M.D.;  Location: SURGERY SAME DAY [facility];  Service: Gastroenterology    DENTAL RESTORATION  10/2017    dental implants to all teeth    PRIMARY C SECTION  1995    DENTAL EXTRACTION(S)  " 1980's    wisdom teeth

## 2025-06-05 ENCOUNTER — OFFICE VISIT (OUTPATIENT)
Dept: BEHAVIORAL HEALTH | Facility: CLINIC | Age: 55
End: 2025-06-05
Payer: COMMERCIAL

## 2025-06-05 DIAGNOSIS — F33.9 EPISODE OF RECURRENT MAJOR DEPRESSIVE DISORDER, UNSPECIFIED DEPRESSION EPISODE SEVERITY (HCC): ICD-10-CM

## 2025-06-05 DIAGNOSIS — F90.2 ATTENTION DEFICIT HYPERACTIVITY DISORDER (ADHD), COMBINED TYPE: Primary | ICD-10-CM

## 2025-06-05 DIAGNOSIS — F32.0 CURRENT MILD EPISODE OF MAJOR DEPRESSIVE DISORDER WITHOUT PRIOR EPISODE (HCC): ICD-10-CM

## 2025-06-05 DIAGNOSIS — F43.10 POST TRAUMATIC STRESS DISORDER (PTSD): ICD-10-CM

## 2025-06-05 PROCEDURE — 90791 PSYCH DIAGNOSTIC EVALUATION: CPT | Performed by: SOCIAL WORKER

## 2025-06-05 PROCEDURE — 99214 OFFICE O/P EST MOD 30 MIN: CPT | Performed by: PSYCHIATRY & NEUROLOGY

## 2025-06-05 RX ORDER — DEXTROAMPHETAMINE SACCHARATE, AMPHETAMINE ASPARTATE MONOHYDRATE, DEXTROAMPHETAMINE SULFATE AND AMPHETAMINE SULFATE 2.5; 2.5; 2.5; 2.5 MG/1; MG/1; MG/1; MG/1
20 CAPSULE, EXTENDED RELEASE ORAL EVERY MORNING
Qty: 60 CAPSULE | Refills: 0 | Status: SHIPPED | OUTPATIENT
Start: 2025-06-05 | End: 2025-07-05

## 2025-06-05 ASSESSMENT — PATIENT HEALTH QUESTIONNAIRE - PHQ9
5. POOR APPETITE OR OVEREATING: 0 - NOT AT ALL
CLINICAL INTERPRETATION OF PHQ2 SCORE: 4
SUM OF ALL RESPONSES TO PHQ QUESTIONS 1-9: 8

## 2025-06-05 ASSESSMENT — ANXIETY QUESTIONNAIRES
GAD7 TOTAL SCORE: 14
2. NOT BEING ABLE TO STOP OR CONTROL WORRYING: MORE THAN HALF THE DAYS
1. FEELING NERVOUS, ANXIOUS, OR ON EDGE: MORE THAN HALF THE DAYS
6. BECOMING EASILY ANNOYED OR IRRITABLE: NEARLY EVERY DAY
4. TROUBLE RELAXING: NEARLY EVERY DAY
7. FEELING AFRAID AS IF SOMETHING AWFUL MIGHT HAPPEN: SEVERAL DAYS
5. BEING SO RESTLESS THAT IT IS HARD TO SIT STILL: MORE THAN HALF THE DAYS
3. WORRYING TOO MUCH ABOUT DIFFERENT THINGS: SEVERAL DAYS
IF YOU CHECKED OFF ANY PROBLEMS ON THIS QUESTIONNAIRE, HOW DIFFICULT HAVE THESE PROBLEMS MADE IT FOR YOU TO DO YOUR WORK, TAKE CARE OF THINGS AT HOME, OR GET ALONG WITH OTHER PEOPLE: SOMEWHAT DIFFICULT
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
## 2025-06-20 ENCOUNTER — APPOINTMENT (OUTPATIENT)
Dept: BEHAVIORAL HEALTH | Facility: CLINIC | Age: 55
End: 2025-06-20
Payer: COMMERCIAL

## 2025-07-09 ENCOUNTER — TELEPHONE (OUTPATIENT)
Dept: MEDICAL GROUP | Facility: IMAGING CENTER | Age: 55
End: 2025-07-09
Payer: COMMERCIAL

## 2025-07-09 DIAGNOSIS — Z00.00 PREVENTATIVE HEALTH CARE: Primary | ICD-10-CM

## 2025-07-09 NOTE — TELEPHONE ENCOUNTER
Caller Name: Bella Garibay  Call Back Number:     How would the patient prefer to be contacted with a response: Phone call do NOT leave a detailed message    Pt requesting routine lab orders and mammogram - states it has been a while

## 2025-07-09 NOTE — TELEPHONE ENCOUNTER
Labs and mammogram ordered.  She did have labs last in January through Dr. Urbina, she may want to check with insurance to make sure they will cover them already/prior to January 2026.

## 2025-07-15 ENCOUNTER — OFFICE VISIT (OUTPATIENT)
Dept: BEHAVIORAL HEALTH | Facility: CLINIC | Age: 55
End: 2025-07-15
Payer: COMMERCIAL

## 2025-07-15 DIAGNOSIS — F90.2 ATTENTION DEFICIT HYPERACTIVITY DISORDER (ADHD), COMBINED TYPE: Primary | ICD-10-CM

## 2025-07-15 DIAGNOSIS — F41.1 GAD (GENERALIZED ANXIETY DISORDER): ICD-10-CM

## 2025-07-15 DIAGNOSIS — F33.9 EPISODE OF RECURRENT MAJOR DEPRESSIVE DISORDER, UNSPECIFIED DEPRESSION EPISODE SEVERITY (HCC): ICD-10-CM

## 2025-07-15 PROCEDURE — 99214 OFFICE O/P EST MOD 30 MIN: CPT | Performed by: PSYCHIATRY & NEUROLOGY

## 2025-07-15 RX ORDER — DEXTROAMPHETAMINE SACCHARATE, AMPHETAMINE ASPARTATE MONOHYDRATE, DEXTROAMPHETAMINE SULFATE AND AMPHETAMINE SULFATE 3.75; 3.75; 3.75; 3.75 MG/1; MG/1; MG/1; MG/1
15 CAPSULE, EXTENDED RELEASE ORAL 2 TIMES DAILY
Qty: 30 CAPSULE | Refills: 0 | Status: SHIPPED | OUTPATIENT
Start: 2025-07-15 | End: 2025-07-29 | Stop reason: SDUPTHER

## 2025-07-15 NOTE — PROGRESS NOTES
PSYCHIATRY FOLLOW-UP NOTE      Name: Bella Garibay  MRN: [id number]  : 1970  Age: 54 y.o.  Date of assessment: 7/15/2025  PCP: ERIKA Kumari  Persons in attendance: Patient      REASON FOR VISIT/CHIEF COMPLAINT (as stated by Patient):  Bella Garibay is a 54 y.o., White female, attending follow-up appointment for mood and anxiety management.      HISTORY OF PRESENT ILLNESS:  Bella Garibay is a 54 y.o. old female with CAROLA, MDD and ADHD comes in today for follow up. Patient was last seen >1 month ago, and following treatment planning recommendations were done:  Increase Adderall XR 10 mg BID  Stop Adderall IR 10 mg daily  Continue Wellbutrin  mg daily  Continue Buspar 10 mg BID  Continue Amitriptyline 150 mg HS for sleep.   Controlled medication treatment agreement signed on 25.    History of Present Illness    She reports a positive response to the longer-acting Adderall, with noticeable improvements in her mood and behavior. Her family has observed these changes, particularly during a recent trip to [place] where she remained calm even when stuck in traffic.   Therapy has been beneficial, helping her to pause and think before reacting.   However, she experiences anxiety about 30 minutes after taking her second dose of Adderall in the afternoon, which she manages by listening to calming music on her phone. This practice helps her relax and shift her perspective.   She has been discussing her progress with her  and family, who have noticed improvements.   Agreed with plan of stopping wellbutrin and increasing her Adderall dosage to see if it further improves her condition without causing destabilization.          CURRENT MEDICATIONS:  Current Medications[1]    MEDICAL HISTORY  Past Medical History[2]  Past Surgical History[3]      PAST PSYCHIATRIC MEDICATIONS  Lexapro, Zoloft  Wellbutrin  Amitriptyline     Adderall     Xanax, Gabapentin         REVIEW OF SYSTEMS:        Constitutional  Obesity   Eyes negative   Ears/Nose/Mouth/Throat negative   Cardiovascular negative   Respiratory negative   Gastrointestinal negative   Genitourinary negative   Muscular negative   Integumentary negative   Neurological negative   Endocrine  Hypothyroidism; DM; Hypopituitarism   Hematologic/Lymphatic negative     PHYSICAL EXAMINAION:  Vital signs: LMP  (LMP Unknown)   Musculoskeletal: Normal gait.   Abnormal movements: none      MENTAL STATUS EXAMINATION      General:   - Grooming and hygiene: Casual,   - Apparent distress: none,   - Behavior: Calm  - Eye Contact:  Good,   - no psychomotor agitation or retardation    - Participation: Active verbal participation  Orientation: Alert and Fully Oriented to person, place and time  Mood: Euthymic  Affect: Flexible and Full range,  Thought Process: Logical and Goal-directed  Thought Content: Denies suicidal or homicidal ideations, intent or plan   Perception: Denies auditory or visual hallucinations. No delusions noted   Attention span and concentration: Intact   Speech:Rate within normal limits and Volume within normal limits  Language: Appropriate   Insight: Good  Judgment: Good  Recent and remote memory: No gross evidence of memory deficits        DEPRESSION SCREENIN/8/2023     2:00 PM 2024     3:30 PM 2025    11:00 AM   Depression Screen (PHQ-2/PHQ-9)   PHQ-2 Total Score 2 0 4   PHQ-9 Total Score 12  8       Interpretation of PHQ-9 Total Score   Score Severity   1-4 No Depression   5-9 Mild Depression   10-14 Moderate Depression   15-19 Moderately Severe Depression   20-27 Severe Depression    CURRENT RISK:       Suicidal: Low       Homicidal: Low       Self-Harm: Low       Relapse: Low       Crisis Safety Plan Reviewed Not Indicated       If evidence of imminent risk is present, intervention/plan:      MEDICAL RECORDS/LABS/DIAGNOSTIC TESTS REVIEWED:  No new lab since last visit     [facility] records -    Reviewed     PLAN:  (1) CAROLA; (2) MDD; (3) ADHD  Slow improvement  Increase Adderall XR 15 mg BID  Stop Wellbutrin  mg daily  Continue Buspar 10 mg BID  Continue Amitriptyline 150 mg HS for sleep.   Controlled medication treatment agreement signed on 4/16/25.  Medication options, alternatives (including no medications) and medication risks/benefits/side effects were discussed in detail.  Explained importance of contraceptive measures while on psychotropic medications, educated to let provider know if ever pregnant or wanting to become pregnant. Verbalized understanding.  The patient was advised to call, message provider on [employer], or come in to the clinic if symptoms worsen or if any future questions/issues regarding their medications arise; the patient verbalized understanding and agreement.    The patient was educated to call 911, call the suicide hotline, or go to local ER if having thoughts of suicide or homicide; verbalized understanding.     Psychological Testing (4/4/25):  patient's test results and history are consistent with Attention Deficit Hyperactivity Disorder, combined type.  It is likely that her anxiety symptoms and fatigue exacerbate her attentional difficulties, but cannot solely explain them.  Although the patient does have a history of TBI (likely mild in severity), this does not explain her attentional symptoms which reportedly began during childhood.         Billing Coding based on:  07471 based on MDM    Return to clinic in 2 weeks or sooner if symptoms worsen.  Next Appointment: instruction provided on how to make the next appointment.     The proposed treatment plan was discussed with the patient who was provided the opportunity to ask questions and make suggestions regarding alternative treatment. Patient verbalized understanding and expressed agreement with the plan.       Pablo Hayden M.D.  07/15/25    This note was created using voice recognition software (Dragon). The  accuracy of the dictation is limited by the abilities of the software. I have reviewed the note prior to signing, however some errors in grammar and context are still possible. If you have any questions related to this note please do not hesitate to contact our office.            [1]   Current Outpatient Medications   Medication Sig Dispense Refill    busPIRone (BUSPAR) 10 MG Tab tablet Take 1 Tablet by mouth 2 times a day. 180 Tablet 1    buPROPion (WELLBUTRIN XL) 150 MG XL tablet Take 1 Tablet by mouth every morning. 90 Tablet 1    triamcinolone acetonide (KENALOG) 0.025 % Cream APPLY TO AFFECTED AREA TWICE A DAY 30 g 0    amitriptyline (ELAVIL) 150 MG Tab TAKE 1 TABLET BY MOUTH AT  BEDTIME AS NEEDED FOR SLEEP 90 Tablet 3    neomycin-polymyxin-dexamethasone (MAXITROL) 3.5-52521-3.1 Suspension ophthalmic suspension INSTILL 1 DROP INTO BOTH EYES 3 TIMES A DAY (Patient not taking: Reported on 10/21/2024)      phenazopyridine (PYRIDIUM) 200 MG Tab Take 1 Tablet by mouth 3 times a day as needed for Moderate Pain or Mild Pain. 6 Tablet 0    albuterol 108 (90 Base) MCG/ACT Aero Soln inhalation aerosol Inhale 2 Puffs every four hours as needed for Shortness of Breath. (Patient not taking: Reported on 10/21/2024) 1 Each 0    levothyroxine (SYNTHROID) 175 MCG Tab TAKE 1 TABLET BY MOUTH EVERY DAY IN THE MORNING ON AN EMPTY STOMACH 90 Tablet 3    ibuprofen (MOTRIN) 800 MG Tab Take 1 Tablet by mouth every 8 hours as needed for Mild Pain. With food. 30 Tablet 0    Tirzepatide (MOUNJARO) 5 MG/0.5ML Solution Pen-injector Inject 5 mg under the skin every 7 days. 2 mL 5    Levothyroxine Sodium 175 MCG Cap 175 mcg 5 d per week and 1/2 pill 2 d per week. 30 Capsule 0    cyanocobalamin (VITAMIN B-12) 1000 MCG/ML Solution INJECT INTRAMUSCULARLY 1 ML AS  DIRECTED EVERY 7 DAYS (DISCARD  28 DAYS AFTER FIRST USE) 8 mL 3    phentermine 15 MG capsule TAKE 1 ORAL CAPSULE 2 TIMES A DAY E88.9      Empagliflozin (JARDIANCE) 25 MG Tab Take 1  "Tablet by mouth every day. 90 Tablet 3    GENOTROPIN 1 MG Recon Soln        No current facility-administered medications for this visit.   [2]   Past Medical History:  Diagnosis Date    Anesthesia     \"hard to sedate\"    Arthritis     Osteo to lower back    Bowel habit changes     Bronchitis 6/17/2014    Dental disorder     All dental implants-upper permanent and lower snap in.    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    Hypothyroid 7/24/2009    Obesity 7/24/2009    Panhypopituitarism (HCC)     Psychiatric problem     anxiety and depression S/P TBI and wt gain.    [3]   Past Surgical History:  Procedure Laterality Date    NC COLONOSCOPY-FLEXIBLE N/A 2/25/2021    Procedure: COLONOSCOPY;  Surgeon: Jme Rick M.D.;  Location: SURGERY SAME DAY [facility];  Service: Gastroenterology    COLONOSCOPY WITH POLYP N/A 2/25/2021    Procedure: COLONOSCOPY, WITH POLYPECTOMY;  Surgeon: Jem Rick M.D.;  Location: SURGERY SAME DAY [facility];  Service: Gastroenterology    DENTAL RESTORATION  10/2017    dental implants to all teeth    PRIMARY C SECTION  1995    DENTAL EXTRACTION(S)  1980's    wisdom teeth     "

## 2025-07-21 ENCOUNTER — APPOINTMENT (OUTPATIENT)
Dept: BEHAVIORAL HEALTH | Facility: CLINIC | Age: 55
End: 2025-07-21
Payer: COMMERCIAL

## 2025-07-29 ENCOUNTER — OFFICE VISIT (OUTPATIENT)
Dept: BEHAVIORAL HEALTH | Facility: CLINIC | Age: 55
End: 2025-07-29
Payer: COMMERCIAL

## 2025-07-29 DIAGNOSIS — F90.2 ATTENTION DEFICIT HYPERACTIVITY DISORDER (ADHD), COMBINED TYPE: ICD-10-CM

## 2025-07-29 DIAGNOSIS — F41.1 GAD (GENERALIZED ANXIETY DISORDER): ICD-10-CM

## 2025-07-29 RX ORDER — BUSPIRONE HYDROCHLORIDE 10 MG/1
10 TABLET ORAL 2 TIMES DAILY
Qty: 180 TABLET | Refills: 1 | Status: SHIPPED | OUTPATIENT
Start: 2025-07-29

## 2025-07-29 RX ORDER — DEXTROAMPHETAMINE SACCHARATE, AMPHETAMINE ASPARTATE MONOHYDRATE, DEXTROAMPHETAMINE SULFATE AND AMPHETAMINE SULFATE 3.75; 3.75; 3.75; 3.75 MG/1; MG/1; MG/1; MG/1
15 CAPSULE, EXTENDED RELEASE ORAL 2 TIMES DAILY
Qty: 30 CAPSULE | Refills: 0 | Status: SHIPPED | OUTPATIENT
Start: 2025-07-31 | End: 2025-08-15

## 2025-07-29 NOTE — PROGRESS NOTES
"    PSYCHIATRY FOLLOW-UP NOTE      Name: Bella Garibay  MRN: [id number]  : 1970  Age: 54 y.o.  Date of assessment: 2025  PCP: ERIKA Kumari  Persons in attendance: Patient      REASON FOR VISIT/CHIEF COMPLAINT (as stated by Patient):  Bella Garibay is a 54 y.o., White female, attending follow-up appointment for mood and anxiety management.      HISTORY OF PRESENT ILLNESS:  Bella Garibay is a 54 y.o. old female with CAROLA, MDD and ADHD comes in today for follow up. Patient was last seen 2 weeks ago, and following treatment planning recommendations were done:  Increase Adderall XR 15 mg BID  Stop Wellbutrin  mg daily  Continue Buspar 10 mg BID  Continue Amitriptyline 150 mg HS for sleep.   Controlled medication treatment agreement signed on 25.    History of Present Illness    She reports a significant improvement in her mood following the discontinuation of Wellbutrin and an increase in Adderall dosage.   She experienced a calming effect from the Adderall, describing it as \"the curtains open up and the sunlight shining through,\" which she had not felt since her head injury.   She discontinued Wellbutrin on the same day as her first Adderall dose and did not experience any adverse effects.   She had a few days of calmness without Wellbutrin but also had some stressful days where she took one dose of Wellbutrin. She is unsure if this was beneficial. She also ran out of BuSpar a week ago and noticed an increase in stress levels.   Her sleep quality has improved with the use of Adderall. She takes Adderall at 8 AM and 2 PM and starts to feel uneasy after dinner. She is considering increasing her morning Adderall dose to 20 mg and reducing her evening dose to 15 mg. Agreed with waiting to see how restarting buspar dose for her first.  She continues to take amitriptyline 150 mg.      Agreed with follow-up in 2 weeks to assess if Adderall dosage needs further " increase or if we need to bring lower dosing of Wellbutrin back.          CURRENT MEDICATIONS:  Current Medications[1]    MEDICAL HISTORY  Past Medical History[2]  Past Surgical History[3]      PAST PSYCHIATRIC MEDICATIONS  Lexapro, Zoloft  Wellbutrin  Amitriptyline      Adderall     Xanax, Gabapentin        REVIEW OF SYSTEMS:        Constitutional  Obesity   Eyes negative   Ears/Nose/Mouth/Throat negative   Cardiovascular negative   Respiratory negative   Gastrointestinal negative   Genitourinary negative   Muscular negative   Integumentary negative   Neurological negative   Endocrine  Hypothyroidism; DM; Hypopituitarism   Hematologic/Lymphatic negative     PHYSICAL EXAMINAION:  Vital signs: LMP  (LMP Unknown)   Musculoskeletal: Normal gait.   Abnormal movements: none      MENTAL STATUS EXAMINATION      General:   - Grooming and hygiene: Casual,   - Apparent distress: none,   - Behavior: Calm  - Eye Contact:  Good,   - no psychomotor agitation or retardation    - Participation: Active verbal participation  Orientation: Alert and Fully Oriented to person, place and time  Mood: Anxious  Affect: Flexible and Full range,  Thought Process: Logical and Goal-directed  Thought Content: Denies suicidal or homicidal ideations, intent or plan   Perception: Denies auditory or visual hallucinations. No delusions noted   Attention span and concentration: Intact   Speech:Rate within normal limits and Volume within normal limits  Language: Appropriate   Insight: Good  Judgment: Good  Recent and remote memory: No gross evidence of memory deficits        DEPRESSION SCREENIN/8/2023     2:00 PM 2024     3:30 PM 2025    11:00 AM   Depression Screen (PHQ-2/PHQ-9)   PHQ-2 Total Score 2 0 4   PHQ-9 Total Score 12  8       Interpretation of PHQ-9 Total Score   Score Severity   1-4 No Depression   5-9 Mild Depression   10-14 Moderate Depression   15-19 Moderately Severe Depression   20-27 Severe Depression    CURRENT  RISK:       Suicidal: Low       Homicidal: Low       Self-Harm: Low       Relapse: Low       Crisis Safety Plan Reviewed Not Indicated       If evidence of imminent risk is present, intervention/plan:      MEDICAL RECORDS/LABS/DIAGNOSTIC TESTS REVIEWED:  No new lab since last visit     [place] [facility] records -   Reviewed     PLAN:  (1) CAROLA; (2) MDD; (3) ADHD  Slow improvement  Continue Adderall XR 15 mg BID  Stopped Wellbutrin  mg daily  Restart Buspar 10 mg BID  Continue Amitriptyline 150 mg HS for sleep.   Controlled medication treatment agreement signed on 4/16/25.  Medication options, alternatives (including no medications) and medication risks/benefits/side effects were discussed in detail.  Explained importance of contraceptive measures while on psychotropic medications, educated to let provider know if ever pregnant or wanting to become pregnant. Verbalized understanding.  The patient was advised to call, message provider on [employer], or come in to the clinic if symptoms worsen or if any future questions/issues regarding their medications arise; the patient verbalized understanding and agreement.    The patient was educated to call 911, call the suicide hotline, or go to local ER if having thoughts of suicide or homicide; verbalized understanding.     Psychological Testing (4/4/25):  patient's test results and history are consistent with Attention Deficit Hyperactivity Disorder, combined type.  It is likely that her anxiety symptoms and fatigue exacerbate her attentional difficulties, but cannot solely explain them.  Although the patient does have a history of TBI (likely mild in severity), this does not explain her attentional symptoms which reportedly began during childhood.         Billing Coding based on:  49881 based on MDM    Return to clinic in 2 weeks or sooner if symptoms worsen.  Next Appointment: instruction provided on how to make the next appointment.     The proposed treatment plan was discussed  with the patient who was provided the opportunity to ask questions and make suggestions regarding alternative treatment. Patient verbalized understanding and expressed agreement with the plan.       Pablo Hayden M.D.  07/29/25    This note was created using voice recognition software (Dragon). The accuracy of the dictation is limited by the abilities of the software. I have reviewed the note prior to signing, however some errors in grammar and context are still possible. If you have any questions related to this note please do not hesitate to contact our office.            [1]   Current Outpatient Medications   Medication Sig Dispense Refill    amphetamine-dextroamphetamine (ADDERALL XR) 15 MG XR capsule Take 1 Capsule by mouth 2 times a day for 15 days. 30 Capsule 0    busPIRone (BUSPAR) 10 MG Tab tablet Take 1 Tablet by mouth 2 times a day. 180 Tablet 1    triamcinolone acetonide (KENALOG) 0.025 % Cream APPLY TO AFFECTED AREA TWICE A DAY 30 g 0    amitriptyline (ELAVIL) 150 MG Tab TAKE 1 TABLET BY MOUTH AT  BEDTIME AS NEEDED FOR SLEEP 90 Tablet 3    neomycin-polymyxin-dexamethasone (MAXITROL) 3.5-90714-9.1 Suspension ophthalmic suspension INSTILL 1 DROP INTO BOTH EYES 3 TIMES A DAY (Patient not taking: Reported on 10/21/2024)      phenazopyridine (PYRIDIUM) 200 MG Tab Take 1 Tablet by mouth 3 times a day as needed for Moderate Pain or Mild Pain. 6 Tablet 0    albuterol 108 (90 Base) MCG/ACT Aero Soln inhalation aerosol Inhale 2 Puffs every four hours as needed for Shortness of Breath. (Patient not taking: Reported on 10/21/2024) 1 Each 0    levothyroxine (SYNTHROID) 175 MCG Tab TAKE 1 TABLET BY MOUTH EVERY DAY IN THE MORNING ON AN EMPTY STOMACH 90 Tablet 3    ibuprofen (MOTRIN) 800 MG Tab Take 1 Tablet by mouth every 8 hours as needed for Mild Pain. With food. 30 Tablet 0    Tirzepatide (MOUNJARO) 5 MG/0.5ML Solution Pen-injector Inject 5 mg under the skin every 7 days. 2 mL 5    Levothyroxine Sodium 175 MCG  "Cap 175 mcg 5 d per week and 1/2 pill 2 d per week. 30 Capsule 0    cyanocobalamin (VITAMIN B-12) 1000 MCG/ML Solution INJECT INTRAMUSCULARLY 1 ML AS  DIRECTED EVERY 7 DAYS (DISCARD  28 DAYS AFTER FIRST USE) 8 mL 3    phentermine 15 MG capsule TAKE 1 ORAL CAPSULE 2 TIMES A DAY E88.9      Empagliflozin (JARDIANCE) 25 MG Tab Take 1 Tablet by mouth every day. 90 Tablet 3    GENOTROPIN 1 MG Recon Soln        No current facility-administered medications for this visit.   [2]   Past Medical History:  Diagnosis Date    Anesthesia     \"hard to sedate\"    Arthritis     Osteo to lower back    Bowel habit changes     Bronchitis 6/17/2014    Dental disorder     All dental implants-upper permanent and lower snap in.    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    Hypothyroid 7/24/2009    Obesity 7/24/2009    Panhypopituitarism (HCC)     Psychiatric problem     anxiety and depression S/P TBI and wt gain.    [3]   Past Surgical History:  Procedure Laterality Date    OR COLONOSCOPY-FLEXIBLE N/A 2/25/2021    Procedure: COLONOSCOPY;  Surgeon: Jem Rick M.D.;  Location: SURGERY SAME DAY [facility];  Service: Gastroenterology    COLONOSCOPY WITH POLYP N/A 2/25/2021    Procedure: COLONOSCOPY, WITH POLYPECTOMY;  Surgeon: Jem Rick M.D.;  Location: SURGERY SAME DAY [facility];  Service: Gastroenterology    DENTAL RESTORATION  10/2017    dental implants to all teeth    PRIMARY C SECTION  1995    DENTAL EXTRACTION(S)  1980's    wisdom teeth     "

## 2025-08-11 RX ORDER — LEVOTHYROXINE SODIUM 175 UG/1
175 TABLET ORAL
Qty: 30 TABLET | Refills: 0 | Status: SHIPPED | OUTPATIENT
Start: 2025-08-11

## 2025-08-12 ENCOUNTER — APPOINTMENT (OUTPATIENT)
Dept: URGENT CARE | Facility: CLINIC | Age: 55
End: 2025-08-12
Payer: COMMERCIAL

## 2025-08-13 ENCOUNTER — OFFICE VISIT (OUTPATIENT)
Dept: BEHAVIORAL HEALTH | Facility: CLINIC | Age: 55
End: 2025-08-13
Payer: COMMERCIAL

## 2025-08-13 DIAGNOSIS — F90.2 ATTENTION DEFICIT HYPERACTIVITY DISORDER (ADHD), COMBINED TYPE: ICD-10-CM

## 2025-08-13 DIAGNOSIS — F41.1 GAD (GENERALIZED ANXIETY DISORDER): ICD-10-CM

## 2025-08-13 DIAGNOSIS — F43.10 POST TRAUMATIC STRESS DISORDER (PTSD): Primary | ICD-10-CM

## 2025-08-13 DIAGNOSIS — F43.21 GRIEF: ICD-10-CM

## 2025-08-13 DIAGNOSIS — F51.01 PRIMARY INSOMNIA: ICD-10-CM

## 2025-08-13 PROCEDURE — 90834 PSYTX W PT 45 MINUTES: CPT | Performed by: SOCIAL WORKER

## 2025-08-13 PROCEDURE — 99214 OFFICE O/P EST MOD 30 MIN: CPT | Performed by: PSYCHIATRY & NEUROLOGY

## 2025-08-13 RX ORDER — DEXTROAMPHETAMINE SACCHARATE, AMPHETAMINE ASPARTATE MONOHYDRATE, DEXTROAMPHETAMINE SULFATE AND AMPHETAMINE SULFATE 3.75; 3.75; 3.75; 3.75 MG/1; MG/1; MG/1; MG/1
15 CAPSULE, EXTENDED RELEASE ORAL 2 TIMES DAILY
Qty: 60 CAPSULE | Refills: 0 | Status: SHIPPED | OUTPATIENT
Start: 2025-08-15 | End: 2025-09-14

## 2025-08-13 RX ORDER — BUSPIRONE HYDROCHLORIDE 10 MG/1
10 TABLET ORAL 2 TIMES DAILY
Qty: 180 TABLET | Refills: 3 | Status: SHIPPED | OUTPATIENT
Start: 2025-08-13

## 2025-08-13 RX ORDER — BUPROPION HYDROCHLORIDE 150 MG/1
150 TABLET ORAL EVERY MORNING
Qty: 90 TABLET | Refills: 3 | Status: SHIPPED | OUTPATIENT
Start: 2025-08-13

## 2025-08-13 RX ORDER — AMITRIPTYLINE HYDROCHLORIDE 150 MG/1
TABLET ORAL
Qty: 90 TABLET | Refills: 3 | Status: SHIPPED | OUTPATIENT
Start: 2025-08-13

## 2025-08-15 DIAGNOSIS — N90.4 LICHEN SCLEROSUS OF FEMALE GENITALIA: ICD-10-CM

## 2025-08-15 RX ORDER — TRIAMCINOLONE ACETONIDE 0.25 MG/G
1 CREAM TOPICAL 2 TIMES DAILY
Qty: 30 G | Refills: 0 | Status: SHIPPED | OUTPATIENT
Start: 2025-08-15

## (undated) DEVICE — SET EXTENSION WITH 2 PORTS (48EA/CA) ***PART #2C8610 IS A SUBSTITUTE*****

## (undated) DEVICE — SENSOR SPO2 NEO LNCS ADHESIVE (20/BX) SEE USER NOTES

## (undated) DEVICE — CONTAINER, SPECIMEN, STERILE

## (undated) DEVICE — CANISTER SUCTION 3000ML MECHANICAL FILTER AUTO SHUTOFF MEDI-VAC NONSTERILE LF DISP  (40EA/CA)

## (undated) DEVICE — CAPTIVATOR II-15MM ROUND STIFF (40/BX)

## (undated) DEVICE — MANIFOLD NEPTUNE 1 PORT (20/PK)

## (undated) DEVICE — ELECTRODE 850 FOAM ADHESIVE - HYDROGEL RADIOTRNSPRNT (50/PK)

## (undated) DEVICE — FILM CASSETTE ENDO

## (undated) DEVICE — CATHETER IV 20 GA X 1-1/4 ---SURG.& SDS ONLY--- (50EA/BX)

## (undated) DEVICE — CANISTER SUCTION RIGID RED 1500CC (40EA/CA)

## (undated) DEVICE — TUBING O2 7FT TIP SMTH BORE - (50/CA)

## (undated) DEVICE — KIT CUSTOM PROCEDURE SINGLE FOR ENDO  (15/CA)

## (undated) DEVICE — CANNULA O2 COMFORT SOFT EAR ADULT 7 FT TUBING (50/CA)

## (undated) DEVICE — TRAP POLYP E-TRAP (25EA/BX)

## (undated) DEVICE — TUBE NG SALEM SUMP 14FR (50EA/BX)

## (undated) DEVICE — GOWN WARMING STANDARD FLEX - (30/CA)

## (undated) DEVICE — MASK WITH FACE SHIELD (25/BX 4BX/CA)

## (undated) DEVICE — TUBE CONNECTING SUCTION - CLEAR PLASTIC STERILE 72 IN (50EA/CA)

## (undated) DEVICE — NEPTUNE 4 PORT MANIFOLD - (20/PK)

## (undated) DEVICE — TUBING CLEARLINK DUO-VENT - C-FLO (48EA/CA)